# Patient Record
Sex: FEMALE | Race: WHITE | NOT HISPANIC OR LATINO | Employment: UNEMPLOYED | URBAN - METROPOLITAN AREA
[De-identification: names, ages, dates, MRNs, and addresses within clinical notes are randomized per-mention and may not be internally consistent; named-entity substitution may affect disease eponyms.]

---

## 2017-01-01 ENCOUNTER — TELEPHONE (OUTPATIENT)
Dept: HEMATOLOGY/ONCOLOGY | Facility: CLINIC | Age: 47
End: 2017-01-01

## 2017-01-01 ENCOUNTER — INFUSION (OUTPATIENT)
Dept: INFUSION THERAPY | Facility: HOSPITAL | Age: 47
End: 2017-01-01
Attending: INTERNAL MEDICINE
Payer: MEDICAID

## 2017-01-01 ENCOUNTER — HOSPITAL ENCOUNTER (OUTPATIENT)
Dept: RADIOLOGY | Facility: HOSPITAL | Age: 47
Discharge: HOME | End: 2017-01-27
Attending: INTERNAL MEDICINE
Payer: MEDICAID

## 2017-01-01 ENCOUNTER — INITIAL CONSULT (OUTPATIENT)
Dept: RADIATION ONCOLOGY | Facility: CLINIC | Age: 47
End: 2017-01-01
Payer: MEDICAID

## 2017-01-01 ENCOUNTER — TELEPHONE (OUTPATIENT)
Dept: RADIATION ONCOLOGY | Facility: CLINIC | Age: 47
End: 2017-01-01

## 2017-01-01 ENCOUNTER — HOSPITAL ENCOUNTER (EMERGENCY)
Facility: HOSPITAL | Age: 47
Discharge: HOME | End: 2017-02-25
Attending: EMERGENCY MEDICINE
Payer: MEDICAID

## 2017-01-01 ENCOUNTER — TELEPHONE (OUTPATIENT)
Dept: ADMINISTRATIVE | Facility: OTHER | Age: 47
End: 2017-01-01

## 2017-01-01 ENCOUNTER — OFFICE VISIT (OUTPATIENT)
Dept: HEMATOLOGY/ONCOLOGY | Facility: CLINIC | Age: 47
End: 2017-01-01
Payer: MEDICAID

## 2017-01-01 ENCOUNTER — LAB VISIT (OUTPATIENT)
Dept: LAB | Facility: HOSPITAL | Age: 47
End: 2017-01-01
Attending: INTERNAL MEDICINE
Payer: MEDICAID

## 2017-01-01 ENCOUNTER — HOSPITAL ENCOUNTER (OUTPATIENT)
Dept: RADIOLOGY | Facility: HOSPITAL | Age: 47
Discharge: HOME | End: 2017-02-25
Attending: INTERNAL MEDICINE
Payer: MEDICAID

## 2017-01-01 ENCOUNTER — HOSPITAL ENCOUNTER (INPATIENT)
Facility: OTHER | Age: 47
LOS: 6 days | Discharge: HOME | DRG: 947 | End: 2017-02-11
Attending: EMERGENCY MEDICINE | Admitting: HOSPITALIST
Payer: MEDICAID

## 2017-01-01 ENCOUNTER — HOSPITAL ENCOUNTER (EMERGENCY)
Facility: HOSPITAL | Age: 47
Discharge: HOME | End: 2017-02-22
Attending: EMERGENCY MEDICINE
Payer: MEDICAID

## 2017-01-01 ENCOUNTER — ANESTHESIA (OUTPATIENT)
Dept: ENDOSCOPY | Facility: HOSPITAL | Age: 47
DRG: 829 | End: 2017-01-01
Payer: MEDICAID

## 2017-01-01 ENCOUNTER — SURGERY (OUTPATIENT)
Age: 47
End: 2017-01-01

## 2017-01-01 ENCOUNTER — PATIENT OUTREACH (OUTPATIENT)
Dept: ADMINISTRATIVE | Facility: CLINIC | Age: 47
End: 2017-01-01
Payer: MEDICAID

## 2017-01-01 ENCOUNTER — HOSPITAL ENCOUNTER (INPATIENT)
Facility: HOSPITAL | Age: 47
LOS: 4 days | DRG: 189 | End: 2017-03-26
Attending: EMERGENCY MEDICINE | Admitting: HOSPITALIST
Payer: MEDICAID

## 2017-01-01 ENCOUNTER — HOSPITAL ENCOUNTER (INPATIENT)
Facility: HOSPITAL | Age: 47
LOS: 3 days | Discharge: HOME HEALTH | DRG: 808 | End: 2017-03-13
Attending: EMERGENCY MEDICINE | Admitting: FAMILY MEDICINE
Payer: MEDICAID

## 2017-01-01 ENCOUNTER — HOSPITAL ENCOUNTER (INPATIENT)
Facility: HOSPITAL | Age: 47
LOS: 3 days | DRG: 988 | End: 2017-01-11
Attending: HOSPITALIST | Admitting: HOSPITALIST
Payer: MEDICAID

## 2017-01-01 ENCOUNTER — HOSPITAL ENCOUNTER (OUTPATIENT)
Dept: RADIATION THERAPY | Facility: HOSPITAL | Age: 47
Discharge: HOME | End: 2017-01-27
Attending: RADIOLOGY
Payer: MEDICAID

## 2017-01-01 ENCOUNTER — DOCUMENTATION ONLY (OUTPATIENT)
Dept: RADIATION ONCOLOGY | Facility: CLINIC | Age: 47
End: 2017-01-01

## 2017-01-01 ENCOUNTER — HOSPITAL ENCOUNTER (OUTPATIENT)
Dept: RADIOLOGY | Facility: HOSPITAL | Age: 47
Discharge: HOME | End: 2017-02-27
Attending: INTERNAL MEDICINE
Payer: MEDICAID

## 2017-01-01 ENCOUNTER — HOSPITAL ENCOUNTER (INPATIENT)
Facility: HOSPITAL | Age: 47
LOS: 2 days | Discharge: HOME | DRG: 829 | End: 2017-01-13
Attending: HOSPITALIST | Admitting: HOSPITALIST
Payer: MEDICAID

## 2017-01-01 ENCOUNTER — HOSPITAL ENCOUNTER (INPATIENT)
Facility: OTHER | Age: 47
LOS: 6 days | Discharge: HOME | DRG: 542 | End: 2017-02-19
Attending: EMERGENCY MEDICINE | Admitting: HOSPITALIST
Payer: MEDICAID

## 2017-01-01 ENCOUNTER — ANESTHESIA EVENT (OUTPATIENT)
Dept: ENDOSCOPY | Facility: HOSPITAL | Age: 47
DRG: 829 | End: 2017-01-01
Payer: MEDICAID

## 2017-01-01 ENCOUNTER — HOSPITAL ENCOUNTER (OUTPATIENT)
Dept: RADIATION THERAPY | Facility: HOSPITAL | Age: 47
Discharge: HOME | DRG: 542 | End: 2017-02-01
Attending: RADIOLOGY
Payer: MEDICAID

## 2017-01-01 VITALS
DIASTOLIC BLOOD PRESSURE: 86 MMHG | TEMPERATURE: 98 F | HEART RATE: 86 BPM | HEIGHT: 62 IN | OXYGEN SATURATION: 94 % | WEIGHT: 160 LBS | RESPIRATION RATE: 18 BRPM | SYSTOLIC BLOOD PRESSURE: 132 MMHG | BODY MASS INDEX: 29.44 KG/M2

## 2017-01-01 VITALS
WEIGHT: 180 LBS | OXYGEN SATURATION: 93 % | RESPIRATION RATE: 16 BRPM | DIASTOLIC BLOOD PRESSURE: 83 MMHG | SYSTOLIC BLOOD PRESSURE: 130 MMHG | HEIGHT: 62 IN | TEMPERATURE: 98 F | HEART RATE: 115 BPM | BODY MASS INDEX: 33.13 KG/M2

## 2017-01-01 VITALS
DIASTOLIC BLOOD PRESSURE: 60 MMHG | TEMPERATURE: 98 F | WEIGHT: 166.88 LBS | OXYGEN SATURATION: 100 % | RESPIRATION RATE: 20 BRPM | SYSTOLIC BLOOD PRESSURE: 121 MMHG | BODY MASS INDEX: 30.52 KG/M2 | HEART RATE: 115 BPM

## 2017-01-01 VITALS
OXYGEN SATURATION: 94 % | RESPIRATION RATE: 18 BRPM | HEART RATE: 78 BPM | HEIGHT: 62 IN | TEMPERATURE: 98 F | BODY MASS INDEX: 33.68 KG/M2 | WEIGHT: 183 LBS | SYSTOLIC BLOOD PRESSURE: 126 MMHG | DIASTOLIC BLOOD PRESSURE: 79 MMHG

## 2017-01-01 VITALS
OXYGEN SATURATION: 97 % | RESPIRATION RATE: 18 BRPM | HEART RATE: 114 BPM | DIASTOLIC BLOOD PRESSURE: 83 MMHG | DIASTOLIC BLOOD PRESSURE: 86 MMHG | BODY MASS INDEX: 29.44 KG/M2 | WEIGHT: 160 LBS | TEMPERATURE: 98 F | SYSTOLIC BLOOD PRESSURE: 120 MMHG | HEIGHT: 62 IN | RESPIRATION RATE: 18 BRPM | HEART RATE: 110 BPM | SYSTOLIC BLOOD PRESSURE: 123 MMHG | TEMPERATURE: 98 F

## 2017-01-01 VITALS
BODY MASS INDEX: 33.13 KG/M2 | WEIGHT: 180 LBS | RESPIRATION RATE: 18 BRPM | DIASTOLIC BLOOD PRESSURE: 79 MMHG | TEMPERATURE: 98 F | OXYGEN SATURATION: 95 % | SYSTOLIC BLOOD PRESSURE: 133 MMHG | HEIGHT: 62 IN | HEART RATE: 63 BPM

## 2017-01-01 VITALS
DIASTOLIC BLOOD PRESSURE: 58 MMHG | HEART RATE: 127 BPM | TEMPERATURE: 100 F | SYSTOLIC BLOOD PRESSURE: 109 MMHG | RESPIRATION RATE: 16 BRPM

## 2017-01-01 VITALS
BODY MASS INDEX: 26.45 KG/M2 | SYSTOLIC BLOOD PRESSURE: 115 MMHG | DIASTOLIC BLOOD PRESSURE: 75 MMHG | TEMPERATURE: 98 F | HEART RATE: 110 BPM | RESPIRATION RATE: 18 BRPM | OXYGEN SATURATION: 97 % | WEIGHT: 143.75 LBS | HEIGHT: 62 IN

## 2017-01-01 VITALS
OXYGEN SATURATION: 93 % | HEIGHT: 62 IN | DIASTOLIC BLOOD PRESSURE: 69 MMHG | RESPIRATION RATE: 18 BRPM | TEMPERATURE: 97 F | SYSTOLIC BLOOD PRESSURE: 110 MMHG | BODY MASS INDEX: 29.81 KG/M2 | WEIGHT: 162 LBS | HEART RATE: 109 BPM

## 2017-01-01 VITALS
DIASTOLIC BLOOD PRESSURE: 60 MMHG | WEIGHT: 166 LBS | RESPIRATION RATE: 20 BRPM | TEMPERATURE: 98 F | HEART RATE: 115 BPM | BODY MASS INDEX: 30.55 KG/M2 | SYSTOLIC BLOOD PRESSURE: 121 MMHG | HEIGHT: 62 IN

## 2017-01-01 VITALS
WEIGHT: 168.19 LBS | HEIGHT: 62 IN | BODY MASS INDEX: 30.95 KG/M2 | DIASTOLIC BLOOD PRESSURE: 93 MMHG | HEART RATE: 103 BPM | TEMPERATURE: 99 F | RESPIRATION RATE: 22 BRPM | OXYGEN SATURATION: 90 % | SYSTOLIC BLOOD PRESSURE: 155 MMHG

## 2017-01-01 DIAGNOSIS — C50.919 METASTATIC BREAST CANCER: ICD-10-CM

## 2017-01-01 DIAGNOSIS — E80.6 HYPERBILIRUBINEMIA: ICD-10-CM

## 2017-01-01 DIAGNOSIS — C80.1 CANCER: ICD-10-CM

## 2017-01-01 DIAGNOSIS — C25.9: ICD-10-CM

## 2017-01-01 DIAGNOSIS — C79.51 METASTATIC CANCER TO BONE: ICD-10-CM

## 2017-01-01 DIAGNOSIS — C80.1 PRIMARY CANCER OF UNKNOWN SITE: ICD-10-CM

## 2017-01-01 DIAGNOSIS — C79.9 MULTIPLE LESIONS OF METASTATIC MALIGNANCY: ICD-10-CM

## 2017-01-01 DIAGNOSIS — K12.30 MUCOSITIS: ICD-10-CM

## 2017-01-01 DIAGNOSIS — J96.00 ACUTE RESPIRATORY FAILURE: ICD-10-CM

## 2017-01-01 DIAGNOSIS — E87.6 HYPOKALEMIA: ICD-10-CM

## 2017-01-01 DIAGNOSIS — K85.90 PANCREATITIS: ICD-10-CM

## 2017-01-01 DIAGNOSIS — G89.3 PAIN, NEOPLASM-RELATED: ICD-10-CM

## 2017-01-01 DIAGNOSIS — R53.1 WEAKNESS: ICD-10-CM

## 2017-01-01 DIAGNOSIS — J98.11 ATELECTASIS: ICD-10-CM

## 2017-01-01 DIAGNOSIS — C79.81 METASTASIS TO BREAST: ICD-10-CM

## 2017-01-01 DIAGNOSIS — E83.52 HYPERCALCEMIA OF MALIGNANCY: Primary | ICD-10-CM

## 2017-01-01 DIAGNOSIS — C25.9: Primary | ICD-10-CM

## 2017-01-01 DIAGNOSIS — C79.9 METASTATIC CANCER: ICD-10-CM

## 2017-01-01 DIAGNOSIS — H02.401 PTOSIS OF EYELID, RIGHT: Primary | ICD-10-CM

## 2017-01-01 DIAGNOSIS — C80.1 PRIMARY CANCER OF UNKNOWN SITE: Primary | ICD-10-CM

## 2017-01-01 DIAGNOSIS — T45.1X5A CHEMOTHERAPY-INDUCED NEUTROPENIA: ICD-10-CM

## 2017-01-01 DIAGNOSIS — R11.2 INTRACTABLE NAUSEA AND VOMITING: ICD-10-CM

## 2017-01-01 DIAGNOSIS — C79.51 METASTATIC CANCER TO BONE: Primary | ICD-10-CM

## 2017-01-01 DIAGNOSIS — H51.0 GAZE PALSY: ICD-10-CM

## 2017-01-01 DIAGNOSIS — H02.409 PTOSIS OF EYELID, UNSPECIFIED LATERALITY: ICD-10-CM

## 2017-01-01 DIAGNOSIS — R00.0 TACHYCARDIA: ICD-10-CM

## 2017-01-01 DIAGNOSIS — E83.42 HYPOMAGNESEMIA: ICD-10-CM

## 2017-01-01 DIAGNOSIS — C79.9: ICD-10-CM

## 2017-01-01 DIAGNOSIS — R06.02 SHORTNESS OF BREATH: ICD-10-CM
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
## 2017-01-01 DIAGNOSIS — R11.2 INTRACTABLE VOMITING WITH NAUSEA, UNSPECIFIED VOMITING TYPE: Primary | ICD-10-CM

## 2017-01-01 DIAGNOSIS — R11.0 NAUSEA: ICD-10-CM

## 2017-01-01 DIAGNOSIS — J44.1 COPD EXACERBATION: ICD-10-CM

## 2017-01-01 DIAGNOSIS — H02.409 PTOSIS OF EYELID, UNSPECIFIED LATERALITY: Primary | ICD-10-CM

## 2017-01-01 DIAGNOSIS — Z51.11 ENCOUNTER FOR ANTINEOPLASTIC CHEMOTHERAPY: ICD-10-CM

## 2017-01-01 DIAGNOSIS — K85.90 ACUTE PANCREATITIS, UNSPECIFIED COMPLICATION STATUS, UNSPECIFIED PANCREATITIS TYPE: ICD-10-CM

## 2017-01-01 DIAGNOSIS — R11.2 NAUSEA AND VOMITING, INTRACTABILITY OF VOMITING NOT SPECIFIED, UNSPECIFIED VOMITING TYPE: ICD-10-CM

## 2017-01-01 DIAGNOSIS — C79.9 METASTATIC DISEASE: ICD-10-CM

## 2017-01-01 DIAGNOSIS — Z51.89 VISIT FOR WOUND CHECK: Primary | ICD-10-CM

## 2017-01-01 DIAGNOSIS — R93.89 ABNORMAL CT SCAN: ICD-10-CM

## 2017-01-01 DIAGNOSIS — C79.9: Primary | ICD-10-CM

## 2017-01-01 DIAGNOSIS — D70.1 CHEMOTHERAPY-INDUCED NEUTROPENIA: ICD-10-CM

## 2017-01-01 DIAGNOSIS — K83.1 OBSTRUCTIVE JAUNDICE: Primary | ICD-10-CM

## 2017-01-01 DIAGNOSIS — M48.50XA VERTEBRAL COMPRESSION FRACTURE: ICD-10-CM

## 2017-01-01 DIAGNOSIS — E83.52 HYPERCALCEMIA OF MALIGNANCY: ICD-10-CM

## 2017-01-01 DIAGNOSIS — C50.919 METASTATIC BREAST CANCER: Primary | ICD-10-CM

## 2017-01-01 DIAGNOSIS — K85.90 PANCREATITIS, ACUTE: ICD-10-CM

## 2017-01-01 DIAGNOSIS — C78.00: ICD-10-CM

## 2017-01-01 DIAGNOSIS — K85.00 IDIOPATHIC ACUTE PANCREATITIS, UNSPECIFIED COMPLICATION STATUS: Primary | ICD-10-CM

## 2017-01-01 DIAGNOSIS — R06.02 SHORTNESS OF BREATH: Primary | ICD-10-CM

## 2017-01-01 DIAGNOSIS — J44.9 CHRONIC OBSTRUCTIVE PULMONARY DISEASE, UNSPECIFIED COPD TYPE: ICD-10-CM

## 2017-01-01 DIAGNOSIS — G89.3 CHRONIC PAIN DUE TO NEOPLASM: ICD-10-CM

## 2017-01-01 DIAGNOSIS — E86.1 INTRAVASCULAR VOLUME DEPLETION: ICD-10-CM

## 2017-01-01 DIAGNOSIS — R60.9 EDEMA, UNSPECIFIED TYPE: ICD-10-CM

## 2017-01-01 LAB
1,25(OH)2D3 SERPL-MCNC: <5 PG/ML
ABO + RH BLD: NORMAL
ACHR BIND AB SER-SCNC: 0 NMOL/L
ACHR MOD AB/ACHR TOTAL SFR SER: 0 %
ACTH PLAS-MCNC: 23 PG/ML
ALBUMIN SERPL BCP-MCNC: 1.5 G/DL
ALBUMIN SERPL BCP-MCNC: 1.7 G/DL
ALBUMIN SERPL BCP-MCNC: 1.8 G/DL
ALBUMIN SERPL BCP-MCNC: 1.9 G/DL
ALBUMIN SERPL BCP-MCNC: 2 G/DL
ALBUMIN SERPL BCP-MCNC: 2.2 G/DL
ALBUMIN SERPL BCP-MCNC: 2.3 G/DL
ALBUMIN SERPL BCP-MCNC: 2.8 G/DL
ALBUMIN SERPL ELPH-MCNC: 2.53 G/DL
ALLENS TEST: ABNORMAL
ALP SERPL-CCNC: 1105 U/L
ALP SERPL-CCNC: 129 U/L
ALP SERPL-CCNC: 139 U/L
ALP SERPL-CCNC: 149 U/L
ALP SERPL-CCNC: 1503 U/L
ALP SERPL-CCNC: 1505 U/L
ALP SERPL-CCNC: 1512 U/L
ALP SERPL-CCNC: 1581 U/L
ALP SERPL-CCNC: 1690 U/L
ALP SERPL-CCNC: 175 U/L
ALP SERPL-CCNC: 177 U/L
ALP SERPL-CCNC: 180 U/L
ALP SERPL-CCNC: 1927 U/L
ALP SERPL-CCNC: 215 U/L
ALP SERPL-CCNC: 236 U/L
ALP SERPL-CCNC: 261 U/L
ALP SERPL-CCNC: 338 U/L
ALPHA1 GLOB SERPL ELPH-MCNC: 0.5 G/DL
ALPHA2 GLOB SERPL ELPH-MCNC: 0.77 G/DL
ALT SERPL W/O P-5'-P-CCNC: 10 U/L
ALT SERPL W/O P-5'-P-CCNC: 101 U/L
ALT SERPL W/O P-5'-P-CCNC: 150 U/L
ALT SERPL W/O P-5'-P-CCNC: 161 U/L
ALT SERPL W/O P-5'-P-CCNC: 17 U/L
ALT SERPL W/O P-5'-P-CCNC: 176 U/L
ALT SERPL W/O P-5'-P-CCNC: 19 U/L
ALT SERPL W/O P-5'-P-CCNC: 21 U/L
ALT SERPL W/O P-5'-P-CCNC: 23 U/L
ALT SERPL W/O P-5'-P-CCNC: 246 U/L
ALT SERPL W/O P-5'-P-CCNC: 26 U/L
ALT SERPL W/O P-5'-P-CCNC: 28 U/L
ALT SERPL W/O P-5'-P-CCNC: 354 U/L
ALT SERPL W/O P-5'-P-CCNC: 36 U/L
ALT SERPL W/O P-5'-P-CCNC: 537 U/L
AMPHIPHYSIN AB TITR SER: NEGATIVE TITER
AMYLASE SERPL-CCNC: 47 U/L
ANION GAP SERPL CALC-SCNC: 10 MMOL/L
ANION GAP SERPL CALC-SCNC: 11 MMOL/L
ANION GAP SERPL CALC-SCNC: 12 MMOL/L
ANION GAP SERPL CALC-SCNC: 12 MMOL/L
ANION GAP SERPL CALC-SCNC: 13 MMOL/L
ANION GAP SERPL CALC-SCNC: 14 MMOL/L
ANION GAP SERPL CALC-SCNC: 17 MMOL/L
ANION GAP SERPL CALC-SCNC: 6 MMOL/L
ANION GAP SERPL CALC-SCNC: 7 MMOL/L
ANION GAP SERPL CALC-SCNC: 8 MMOL/L
ANION GAP SERPL CALC-SCNC: 9 MMOL/L
ANISOCYTOSIS BLD QL SMEAR: ABNORMAL
ANISOCYTOSIS BLD QL SMEAR: SLIGHT
AST SERPL-CCNC: 106 U/L
AST SERPL-CCNC: 14 U/L
AST SERPL-CCNC: 14 U/L
AST SERPL-CCNC: 146 U/L
AST SERPL-CCNC: 16 U/L
AST SERPL-CCNC: 17 U/L
AST SERPL-CCNC: 19 U/L
AST SERPL-CCNC: 20 U/L
AST SERPL-CCNC: 21 U/L
AST SERPL-CCNC: 30 U/L
AST SERPL-CCNC: 33 U/L
AST SERPL-CCNC: 33 U/L
AST SERPL-CCNC: 50 U/L
AST SERPL-CCNC: 84 U/L
AST SERPL-CCNC: 87 U/L
AST SERPL-CCNC: 87 U/L
AST SERPL-CCNC: 97 U/L
B-GLOBULIN SERPL ELPH-MCNC: 0.79 G/DL
B-HCG UR QL: NEGATIVE
B2 MICROGLOB SERPL-MCNC: 2.2 UG/ML
BACTERIA UR CULT: NO GROWTH
BACTERIA UR CULT: NO GROWTH
BASOPHILS # BLD AUTO: 0 K/UL
BASOPHILS # BLD AUTO: 0.01 K/UL
BASOPHILS # BLD AUTO: 0.01 K/UL
BASOPHILS # BLD AUTO: 0.02 K/UL
BASOPHILS # BLD AUTO: 0.04 K/UL
BASOPHILS # BLD AUTO: 0.1 K/UL
BASOPHILS NFR BLD: 0 %
BASOPHILS NFR BLD: 0.1 %
BASOPHILS NFR BLD: 0.1 %
BASOPHILS NFR BLD: 0.2 %
BASOPHILS NFR BLD: 0.3 %
BASOPHILS NFR BLD: 0.4 %
BASOPHILS NFR BLD: 0.5 %
BASOPHILS NFR BLD: 0.5 %
BASOPHILS NFR BLD: 0.8 %
BILIRUB SERPL-MCNC: 0.4 MG/DL
BILIRUB SERPL-MCNC: 0.5 MG/DL
BILIRUB SERPL-MCNC: 0.5 MG/DL
BILIRUB SERPL-MCNC: 0.6 MG/DL
BILIRUB SERPL-MCNC: 0.6 MG/DL
BILIRUB SERPL-MCNC: 0.8 MG/DL
BILIRUB SERPL-MCNC: 0.9 MG/DL
BILIRUB SERPL-MCNC: 0.9 MG/DL
BILIRUB SERPL-MCNC: 1.4 MG/DL
BILIRUB SERPL-MCNC: 1.6 MG/DL
BILIRUB SERPL-MCNC: 2.9 MG/DL
BILIRUB SERPL-MCNC: 4.2 MG/DL
BILIRUB SERPL-MCNC: 4.3 MG/DL
BILIRUB SERPL-MCNC: 5.3 MG/DL
BILIRUB SERPL-MCNC: 6 MG/DL
BILIRUB SERPL-MCNC: 6.3 MG/DL
BILIRUB SERPL-MCNC: 6.7 MG/DL
BILIRUB UR QL STRIP: ABNORMAL
BILIRUB UR QL STRIP: NEGATIVE
BILIRUB UR QL STRIP: NEGATIVE
BLD GP AB SCN CELLS X3 SERPL QL: NORMAL
BLD PROD TYP BPU: NORMAL
BLOOD UNIT EXPIRATION DATE: NORMAL
BLOOD UNIT TYPE CODE: 6200
BLOOD UNIT TYPE: NORMAL
BNP SERPL-MCNC: <10 PG/ML
BUN SERPL-MCNC: 10 MG/DL
BUN SERPL-MCNC: 11 MG/DL
BUN SERPL-MCNC: 2 MG/DL
BUN SERPL-MCNC: 2 MG/DL
BUN SERPL-MCNC: 3 MG/DL
BUN SERPL-MCNC: 4 MG/DL
BUN SERPL-MCNC: 5 MG/DL
BUN SERPL-MCNC: 6 MG/DL
BUN SERPL-MCNC: 7 MG/DL
BUN SERPL-MCNC: 8 MG/DL
CA-I BLDV-SCNC: 0.98 MMOL/L
CA-I BLDV-SCNC: 1.23 MMOL/L
CALCIUM SERPL-MCNC: 10 MG/DL
CALCIUM SERPL-MCNC: 10.1 MG/DL
CALCIUM SERPL-MCNC: 10.3 MG/DL
CALCIUM SERPL-MCNC: 10.3 MG/DL
CALCIUM SERPL-MCNC: 11.1 MG/DL
CALCIUM SERPL-MCNC: 11.6 MG/DL
CALCIUM SERPL-MCNC: 7.3 MG/DL
CALCIUM SERPL-MCNC: 7.5 MG/DL
CALCIUM SERPL-MCNC: 7.6 MG/DL
CALCIUM SERPL-MCNC: 7.7 MG/DL
CALCIUM SERPL-MCNC: 7.7 MG/DL
CALCIUM SERPL-MCNC: 7.9 MG/DL
CALCIUM SERPL-MCNC: 7.9 MG/DL
CALCIUM SERPL-MCNC: 8 MG/DL
CALCIUM SERPL-MCNC: 8.1 MG/DL
CALCIUM SERPL-MCNC: 8.3 MG/DL
CALCIUM SERPL-MCNC: 8.3 MG/DL
CALCIUM SERPL-MCNC: 8.4 MG/DL
CALCIUM SERPL-MCNC: 8.4 MG/DL
CALCIUM SERPL-MCNC: 8.7 MG/DL
CALCIUM SERPL-MCNC: 8.8 MG/DL
CALCIUM SERPL-MCNC: 8.8 MG/DL
CALCIUM SERPL-MCNC: 9.1 MG/DL
CALCIUM SERPL-MCNC: 9.2 MG/DL
CALCIUM SERPL-MCNC: 9.2 MG/DL
CALCIUM SERPL-MCNC: 9.5 MG/DL
CALCIUM SERPL-MCNC: 9.9 MG/DL
CANCER AG125 SERPL-ACNC: 7770 U/ML
CANCER AG19-9 SERPL-ACNC: ABNORMAL U/ML
CANCER AG27-29 SERPL-ACNC: 5674 U/ML
CEA SERPL-MCNC: 5455.7 NG/ML
CHLORIDE SERPL-SCNC: 100 MMOL/L
CHLORIDE SERPL-SCNC: 101 MMOL/L
CHLORIDE SERPL-SCNC: 102 MMOL/L
CHLORIDE SERPL-SCNC: 103 MMOL/L
CHLORIDE SERPL-SCNC: 103 MMOL/L
CHLORIDE SERPL-SCNC: 105 MMOL/L
CHLORIDE SERPL-SCNC: 107 MMOL/L
CHLORIDE SERPL-SCNC: 108 MMOL/L
CHLORIDE SERPL-SCNC: 108 MMOL/L
CHLORIDE SERPL-SCNC: 109 MMOL/L
CHLORIDE SERPL-SCNC: 110 MMOL/L
CHLORIDE SERPL-SCNC: 87 MMOL/L
CHLORIDE SERPL-SCNC: 90 MMOL/L
CHLORIDE SERPL-SCNC: 92 MMOL/L
CHLORIDE SERPL-SCNC: 95 MMOL/L
CHLORIDE SERPL-SCNC: 96 MMOL/L
CHLORIDE SERPL-SCNC: 97 MMOL/L
CHLORIDE SERPL-SCNC: 98 MMOL/L
CHLORIDE SERPL-SCNC: 98 MMOL/L
CHLORIDE SERPL-SCNC: 99 MMOL/L
CLARITY UR: CLEAR
CO2 SERPL-SCNC: 20 MMOL/L
CO2 SERPL-SCNC: 22 MMOL/L
CO2 SERPL-SCNC: 23 MMOL/L
CO2 SERPL-SCNC: 24 MMOL/L
CO2 SERPL-SCNC: 25 MMOL/L
CO2 SERPL-SCNC: 26 MMOL/L
CO2 SERPL-SCNC: 27 MMOL/L
CO2 SERPL-SCNC: 28 MMOL/L
CO2 SERPL-SCNC: 28 MMOL/L
CO2 SERPL-SCNC: 29 MMOL/L
CO2 SERPL-SCNC: 29 MMOL/L
CO2 SERPL-SCNC: 30 MMOL/L
CO2 SERPL-SCNC: 31 MMOL/L
CO2 SERPL-SCNC: 31 MMOL/L
CO2 SERPL-SCNC: 32 MMOL/L
CO2 SERPL-SCNC: 33 MMOL/L
CO2 SERPL-SCNC: 33 MMOL/L
CO2 SERPL-SCNC: 34 MMOL/L
CO2 SERPL-SCNC: 36 MMOL/L
CODING SYSTEM: NORMAL
COLOR UR: YELLOW
CORTIS SERPL-MCNC: 10.8 UG/DL
CREAT SERPL-MCNC: 0.4 MG/DL
CREAT SERPL-MCNC: 0.5 MG/DL
CREAT SERPL-MCNC: 0.6 MG/DL
CREAT SERPL-MCNC: 1.1 MG/DL
CTP QC/QA: YES
CV2 IGG TITR SER: NEGATIVE TITER
DACRYOCYTES BLD QL SMEAR: ABNORMAL
DELSYS: ABNORMAL
DIFFERENTIAL METHOD: ABNORMAL
DISPENSE STATUS: NORMAL
EBV EA IGG SER QL IF: ABNORMAL TITER
EBV NA IGG SER IA-ACNC: ABNORMAL TITER
EBV VCA IGG SER IA-ACNC: ABNORMAL TITER
EBV VCA IGM SER IA-ACNC: ABNORMAL TITER
EOSINOPHIL # BLD AUTO: 0 K/UL
EOSINOPHIL # BLD AUTO: 0.1 K/UL
EOSINOPHIL # BLD AUTO: 0.2 K/UL
EOSINOPHIL NFR BLD: 0 %
EOSINOPHIL NFR BLD: 0.2 %
EOSINOPHIL NFR BLD: 0.4 %
EOSINOPHIL NFR BLD: 0.5 %
EOSINOPHIL NFR BLD: 0.7 %
EOSINOPHIL NFR BLD: 0.8 %
EOSINOPHIL NFR BLD: 1 %
EOSINOPHIL NFR BLD: 1 %
EOSINOPHIL NFR BLD: 1.2 %
EOSINOPHIL NFR BLD: 1.7 %
EOSINOPHIL NFR BLD: 2.3 %
EOSINOPHIL NFR BLD: 2.7 %
EP: 6
ERYTHROCYTE [DISTWIDTH] IN BLOOD BY AUTOMATED COUNT: 14.3 %
ERYTHROCYTE [DISTWIDTH] IN BLOOD BY AUTOMATED COUNT: 14.4 %
ERYTHROCYTE [DISTWIDTH] IN BLOOD BY AUTOMATED COUNT: 14.5 %
ERYTHROCYTE [DISTWIDTH] IN BLOOD BY AUTOMATED COUNT: 14.5 %
ERYTHROCYTE [DISTWIDTH] IN BLOOD BY AUTOMATED COUNT: 14.9 %
ERYTHROCYTE [DISTWIDTH] IN BLOOD BY AUTOMATED COUNT: 15.6 %
ERYTHROCYTE [DISTWIDTH] IN BLOOD BY AUTOMATED COUNT: 15.7 %
ERYTHROCYTE [DISTWIDTH] IN BLOOD BY AUTOMATED COUNT: 15.9 %
ERYTHROCYTE [DISTWIDTH] IN BLOOD BY AUTOMATED COUNT: 16.6 %
ERYTHROCYTE [DISTWIDTH] IN BLOOD BY AUTOMATED COUNT: 16.8 %
ERYTHROCYTE [DISTWIDTH] IN BLOOD BY AUTOMATED COUNT: 16.9 %
ERYTHROCYTE [DISTWIDTH] IN BLOOD BY AUTOMATED COUNT: 18.7 %
ERYTHROCYTE [DISTWIDTH] IN BLOOD BY AUTOMATED COUNT: 19 %
ERYTHROCYTE [DISTWIDTH] IN BLOOD BY AUTOMATED COUNT: 19.6 %
ERYTHROCYTE [DISTWIDTH] IN BLOOD BY AUTOMATED COUNT: 19.6 %
ERYTHROCYTE [DISTWIDTH] IN BLOOD BY AUTOMATED COUNT: 21 %
ERYTHROCYTE [DISTWIDTH] IN BLOOD BY AUTOMATED COUNT: 21.1 %
ERYTHROCYTE [DISTWIDTH] IN BLOOD BY AUTOMATED COUNT: 21.3 %
ERYTHROCYTE [DISTWIDTH] IN BLOOD BY AUTOMATED COUNT: 21.8 %
ERYTHROCYTE [DISTWIDTH] IN BLOOD BY AUTOMATED COUNT: 22 %
ERYTHROCYTE [DISTWIDTH] IN BLOOD BY AUTOMATED COUNT: 22.2 %
ERYTHROCYTE [DISTWIDTH] IN BLOOD BY AUTOMATED COUNT: 22.7 %
ERYTHROCYTE [SEDIMENTATION RATE] IN BLOOD BY WESTERGREN METHOD: 8 MM/H
EST. GFR  (AFRICAN AMERICAN): >60 ML/MIN/1.73 M^2
EST. GFR  (NON AFRICAN AMERICAN): >60 ML/MIN/1.73 M^2
ESTIMATED AVG GLUCOSE: 134 MG/DL
ESTIMATED AVG GLUCOSE: 137 MG/DL
FIO2: 40
FLUAV AG SPEC QL IA: NEGATIVE
FLUAV AG SPEC QL IA: NEGATIVE
FLUBV AG SPEC QL IA: NEGATIVE
FLUBV AG SPEC QL IA: NEGATIVE
GAMMA GLOB SERPL ELPH-MCNC: 0.61 G/DL
GLIAL NUC TYPE 1 AB TITR SER: NEGATIVE TITER
GLUCOSE SERPL-MCNC: 104 MG/DL
GLUCOSE SERPL-MCNC: 107 MG/DL
GLUCOSE SERPL-MCNC: 107 MG/DL
GLUCOSE SERPL-MCNC: 114 MG/DL
GLUCOSE SERPL-MCNC: 115 MG/DL
GLUCOSE SERPL-MCNC: 115 MG/DL
GLUCOSE SERPL-MCNC: 116 MG/DL
GLUCOSE SERPL-MCNC: 116 MG/DL
GLUCOSE SERPL-MCNC: 119 MG/DL
GLUCOSE SERPL-MCNC: 120 MG/DL
GLUCOSE SERPL-MCNC: 124 MG/DL
GLUCOSE SERPL-MCNC: 130 MG/DL
GLUCOSE SERPL-MCNC: 135 MG/DL
GLUCOSE SERPL-MCNC: 137 MG/DL
GLUCOSE SERPL-MCNC: 138 MG/DL
GLUCOSE SERPL-MCNC: 140 MG/DL
GLUCOSE SERPL-MCNC: 144 MG/DL
GLUCOSE SERPL-MCNC: 148 MG/DL
GLUCOSE SERPL-MCNC: 155 MG/DL
GLUCOSE SERPL-MCNC: 181 MG/DL
GLUCOSE SERPL-MCNC: 230 MG/DL
GLUCOSE SERPL-MCNC: 79 MG/DL
GLUCOSE SERPL-MCNC: 93 MG/DL
GLUCOSE SERPL-MCNC: 98 MG/DL
GLUCOSE SERPL-MCNC: 98 MG/DL
GLUCOSE UR QL STRIP: NEGATIVE
HBA1C MFR BLD HPLC: 6.3 %
HBA1C MFR BLD HPLC: 6.4 %
HCO3 UR-SCNC: 34 MMOL/L (ref 24–28)
HCT VFR BLD AUTO: 18.9 %
HCT VFR BLD AUTO: 24.4 %
HCT VFR BLD AUTO: 24.4 %
HCT VFR BLD AUTO: 24.6 %
HCT VFR BLD AUTO: 24.6 %
HCT VFR BLD AUTO: 24.8 %
HCT VFR BLD AUTO: 25.2 %
HCT VFR BLD AUTO: 25.3 %
HCT VFR BLD AUTO: 26.4 %
HCT VFR BLD AUTO: 27.1 %
HCT VFR BLD AUTO: 27.3 %
HCT VFR BLD AUTO: 27.6 %
HCT VFR BLD AUTO: 28.3 %
HCT VFR BLD AUTO: 28.5 %
HCT VFR BLD AUTO: 30.5 %
HCT VFR BLD AUTO: 30.6 %
HCT VFR BLD AUTO: 31.1 %
HCT VFR BLD AUTO: 31.3 %
HCT VFR BLD AUTO: 31.9 %
HCT VFR BLD AUTO: 32.1 %
HCT VFR BLD AUTO: 33.1 %
HCT VFR BLD AUTO: 33.8 %
HCT VFR BLD AUTO: 35.7 %
HCT VFR BLD AUTO: 41.9 %
HGB BLD-MCNC: 10 G/DL
HGB BLD-MCNC: 10.4 G/DL
HGB BLD-MCNC: 10.4 G/DL
HGB BLD-MCNC: 10.6 G/DL
HGB BLD-MCNC: 10.6 G/DL
HGB BLD-MCNC: 12 G/DL
HGB BLD-MCNC: 14 G/DL
HGB BLD-MCNC: 6.3 G/DL
HGB BLD-MCNC: 7.7 G/DL
HGB BLD-MCNC: 7.7 G/DL
HGB BLD-MCNC: 7.8 G/DL
HGB BLD-MCNC: 7.9 G/DL
HGB BLD-MCNC: 8.2 G/DL
HGB BLD-MCNC: 8.5 G/DL
HGB BLD-MCNC: 8.6 G/DL
HGB BLD-MCNC: 8.8 G/DL
HGB BLD-MCNC: 9 G/DL
HGB BLD-MCNC: 9.2 G/DL
HGB BLD-MCNC: 9.2 G/DL
HGB BLD-MCNC: 9.3 G/DL
HGB BLD-MCNC: 9.5 G/DL
HGB BLD-MCNC: 9.8 G/DL
HGB UR QL STRIP: ABNORMAL
HGB UR QL STRIP: NEGATIVE
HGB UR QL STRIP: NEGATIVE
HU1 AB TITR SER: NEGATIVE TITER
HU2 AB TITR SER IF: NEGATIVE TITER
HU3 AB TITR SER: NEGATIVE TITER
HYPOCHROMIA BLD QL SMEAR: ABNORMAL
IGG SERPL-MCNC: 664 MG/DL
IMMUNOLOGIST REVIEW: NORMAL
INR PPP: 1.1
INTERPRETATION SERPL IFE-IMP: NORMAL
INTERPRETATION UR IFE-IMP: NORMAL
IP: 12
KAPPA LC SER QL IA: 2.23 MG/DL
KAPPA LC/LAMBDA SER IA: 0.99
KETONES UR QL STRIP: ABNORMAL
KETONES UR QL STRIP: ABNORMAL
KETONES UR QL STRIP: NEGATIVE
LACTATE SERPL-SCNC: 0.5 MMOL/L
LACTATE SERPL-SCNC: 0.8 MMOL/L
LACTATE SERPL-SCNC: 1.2 MMOL/L
LACTATE SERPL-SCNC: 2.1 MMOL/L
LAMBDA LC SER QL IA: 2.25 MG/DL
LDH SERPL L TO P-CCNC: 269 U/L
LEUKOCYTE ESTERASE UR QL STRIP: NEGATIVE
LIPASE SERPL-CCNC: 14 U/L
LIPASE SERPL-CCNC: 181 U/L
LIPASE SERPL-CCNC: 63 U/L
LIPASE SERPL-CCNC: 73 U/L
LYMPHOCYTES # BLD AUTO: 0.2 K/UL
LYMPHOCYTES # BLD AUTO: 0.2 K/UL
LYMPHOCYTES # BLD AUTO: 0.3 K/UL
LYMPHOCYTES # BLD AUTO: 0.4 K/UL
LYMPHOCYTES # BLD AUTO: 0.6 K/UL
LYMPHOCYTES # BLD AUTO: 0.6 K/UL
LYMPHOCYTES # BLD AUTO: 0.8 K/UL
LYMPHOCYTES # BLD AUTO: 0.8 K/UL
LYMPHOCYTES # BLD AUTO: 0.9 K/UL
LYMPHOCYTES # BLD AUTO: 1.3 K/UL
LYMPHOCYTES # BLD AUTO: 1.4 K/UL
LYMPHOCYTES # BLD AUTO: 1.5 K/UL
LYMPHOCYTES # BLD AUTO: 1.6 K/UL
LYMPHOCYTES # BLD AUTO: 1.8 K/UL
LYMPHOCYTES # BLD AUTO: 1.9 K/UL
LYMPHOCYTES NFR BLD: 1 %
LYMPHOCYTES NFR BLD: 10.6 %
LYMPHOCYTES NFR BLD: 11 %
LYMPHOCYTES NFR BLD: 11.2 %
LYMPHOCYTES NFR BLD: 12.4 %
LYMPHOCYTES NFR BLD: 13.9 %
LYMPHOCYTES NFR BLD: 15 %
LYMPHOCYTES NFR BLD: 15.2 %
LYMPHOCYTES NFR BLD: 16.6 %
LYMPHOCYTES NFR BLD: 17.1 %
LYMPHOCYTES NFR BLD: 18 %
LYMPHOCYTES NFR BLD: 18.2 %
LYMPHOCYTES NFR BLD: 20.1 %
LYMPHOCYTES NFR BLD: 21.1 %
LYMPHOCYTES NFR BLD: 24.9 %
LYMPHOCYTES NFR BLD: 5.7 %
LYMPHOCYTES NFR BLD: 5.7 %
LYMPHOCYTES NFR BLD: 6.2 %
LYMPHOCYTES NFR BLD: 7.7 %
LYMPHOCYTES NFR BLD: 7.9 %
LYMPHOCYTES NFR BLD: 8.5 %
LYMPHOCYTES NFR BLD: 9.2 %
MAGNESIUM SERPL-MCNC: 1 MG/DL
MAGNESIUM SERPL-MCNC: 1 MG/DL
MAGNESIUM SERPL-MCNC: 1.1 MG/DL
MAGNESIUM SERPL-MCNC: 1.2 MG/DL
MAGNESIUM SERPL-MCNC: 1.2 MG/DL
MAGNESIUM SERPL-MCNC: 1.3 MG/DL
MAGNESIUM SERPL-MCNC: 1.4 MG/DL
MAGNESIUM SERPL-MCNC: 1.4 MG/DL
MAGNESIUM SERPL-MCNC: 1.5 MG/DL
MAGNESIUM SERPL-MCNC: 1.6 MG/DL
MCH RBC QN AUTO: 25.4 PG
MCH RBC QN AUTO: 25.7 PG
MCH RBC QN AUTO: 25.8 PG
MCH RBC QN AUTO: 25.9 PG
MCH RBC QN AUTO: 26.3 PG
MCH RBC QN AUTO: 26.4 PG
MCH RBC QN AUTO: 26.5 PG
MCH RBC QN AUTO: 26.6 PG
MCH RBC QN AUTO: 26.7 PG
MCH RBC QN AUTO: 26.8 PG
MCH RBC QN AUTO: 26.9 PG
MCH RBC QN AUTO: 27 PG
MCH RBC QN AUTO: 27.3 PG
MCH RBC QN AUTO: 27.3 PG
MCH RBC QN AUTO: 27.4 PG
MCH RBC QN AUTO: 27.7 PG
MCH RBC QN AUTO: 28.3 PG
MCH RBC QN AUTO: 29.3 PG
MCH RBC QN AUTO: 29.5 PG
MCH RBC QN AUTO: 29.8 PG
MCH RBC QN AUTO: 29.8 PG
MCH RBC QN AUTO: 29.9 PG
MCHC RBC AUTO-ENTMCNC: 30.1 %
MCHC RBC AUTO-ENTMCNC: 30.4 %
MCHC RBC AUTO-ENTMCNC: 30.5 %
MCHC RBC AUTO-ENTMCNC: 30.6 %
MCHC RBC AUTO-ENTMCNC: 30.8 %
MCHC RBC AUTO-ENTMCNC: 31.2 %
MCHC RBC AUTO-ENTMCNC: 31.4 %
MCHC RBC AUTO-ENTMCNC: 31.5 %
MCHC RBC AUTO-ENTMCNC: 31.7 %
MCHC RBC AUTO-ENTMCNC: 31.7 %
MCHC RBC AUTO-ENTMCNC: 31.9 %
MCHC RBC AUTO-ENTMCNC: 32 %
MCHC RBC AUTO-ENTMCNC: 32 %
MCHC RBC AUTO-ENTMCNC: 32.2 %
MCHC RBC AUTO-ENTMCNC: 32.2 %
MCHC RBC AUTO-ENTMCNC: 32.4 %
MCHC RBC AUTO-ENTMCNC: 32.7 %
MCHC RBC AUTO-ENTMCNC: 32.7 %
MCHC RBC AUTO-ENTMCNC: 33.2 %
MCHC RBC AUTO-ENTMCNC: 33.4 %
MCHC RBC AUTO-ENTMCNC: 33.5 %
MCHC RBC AUTO-ENTMCNC: 33.5 %
MCHC RBC AUTO-ENTMCNC: 33.7 %
MCHC RBC AUTO-ENTMCNC: 34.1 %
MCV RBC AUTO: 79 FL
MCV RBC AUTO: 80 FL
MCV RBC AUTO: 81 FL
MCV RBC AUTO: 81 FL
MCV RBC AUTO: 82 FL
MCV RBC AUTO: 83 FL
MCV RBC AUTO: 84 FL
MCV RBC AUTO: 85 FL
MCV RBC AUTO: 86 FL
MCV RBC AUTO: 87 FL
MCV RBC AUTO: 88 FL
MCV RBC AUTO: 90 FL
MG INTERPRETIVE COMMENTS: NORMAL
MIN VOL: 9
MODE: ABNORMAL
MONOCYTES # BLD AUTO: 0.1 K/UL
MONOCYTES # BLD AUTO: 0.2 K/UL
MONOCYTES # BLD AUTO: 0.3 K/UL
MONOCYTES # BLD AUTO: 0.3 K/UL
MONOCYTES # BLD AUTO: 0.4 K/UL
MONOCYTES # BLD AUTO: 0.6 K/UL
MONOCYTES # BLD AUTO: 0.7 K/UL
MONOCYTES # BLD AUTO: 0.9 K/UL
MONOCYTES NFR BLD: 1.2 %
MONOCYTES NFR BLD: 2 %
MONOCYTES NFR BLD: 2 %
MONOCYTES NFR BLD: 2.3 %
MONOCYTES NFR BLD: 3.2 %
MONOCYTES NFR BLD: 3.4 %
MONOCYTES NFR BLD: 4.3 %
MONOCYTES NFR BLD: 4.3 %
MONOCYTES NFR BLD: 4.7 %
MONOCYTES NFR BLD: 5.1 %
MONOCYTES NFR BLD: 5.4 %
MONOCYTES NFR BLD: 5.5 %
MONOCYTES NFR BLD: 5.5 %
MONOCYTES NFR BLD: 6 %
MONOCYTES NFR BLD: 6.5 %
MONOCYTES NFR BLD: 7 %
MONOCYTES NFR BLD: 7.2 %
MONOCYTES NFR BLD: 7.3 %
MONOCYTES NFR BLD: 7.4 %
MONOCYTES NFR BLD: 7.8 %
MONOCYTES NFR BLD: 8.4 %
MONOCYTES NFR BLD: 9.2 %
MUSK ANTIBODY TEST: 0 NMOL/L (ref 0–0.02)
NACHR AB SER-SCNC: 0 NMOL/L
NEUTROPHILS # BLD AUTO: 1.5 K/UL
NEUTROPHILS # BLD AUTO: 1.6 K/UL
NEUTROPHILS # BLD AUTO: 1.9 K/UL
NEUTROPHILS # BLD AUTO: 10.9 K/UL
NEUTROPHILS # BLD AUTO: 2.5 K/UL
NEUTROPHILS # BLD AUTO: 2.6 K/UL
NEUTROPHILS # BLD AUTO: 3.8 K/UL
NEUTROPHILS # BLD AUTO: 4 K/UL
NEUTROPHILS # BLD AUTO: 4 K/UL
NEUTROPHILS # BLD AUTO: 4.1 K/UL
NEUTROPHILS # BLD AUTO: 4.4 K/UL
NEUTROPHILS # BLD AUTO: 4.5 K/UL
NEUTROPHILS # BLD AUTO: 4.6 K/UL
NEUTROPHILS # BLD AUTO: 5 K/UL
NEUTROPHILS # BLD AUTO: 5.3 K/UL
NEUTROPHILS # BLD AUTO: 5.9 K/UL
NEUTROPHILS # BLD AUTO: 6 K/UL
NEUTROPHILS # BLD AUTO: 6 K/UL
NEUTROPHILS # BLD AUTO: 6.8 K/UL
NEUTROPHILS # BLD AUTO: 6.9 K/UL
NEUTROPHILS # BLD AUTO: 7.2 K/UL
NEUTROPHILS # BLD AUTO: 8.7 K/UL
NEUTROPHILS NFR BLD: 63.3 %
NEUTROPHILS NFR BLD: 68.8 %
NEUTROPHILS NFR BLD: 70 %
NEUTROPHILS NFR BLD: 72 %
NEUTROPHILS NFR BLD: 72.3 %
NEUTROPHILS NFR BLD: 74.1 %
NEUTROPHILS NFR BLD: 74.3 %
NEUTROPHILS NFR BLD: 75.6 %
NEUTROPHILS NFR BLD: 76.9 %
NEUTROPHILS NFR BLD: 78.9 %
NEUTROPHILS NFR BLD: 80.7 %
NEUTROPHILS NFR BLD: 81 %
NEUTROPHILS NFR BLD: 82.1 %
NEUTROPHILS NFR BLD: 82.8 %
NEUTROPHILS NFR BLD: 83.8 %
NEUTROPHILS NFR BLD: 84.7 %
NEUTROPHILS NFR BLD: 87 %
NEUTROPHILS NFR BLD: 87.3 %
NEUTROPHILS NFR BLD: 87.4 %
NEUTROPHILS NFR BLD: 88.6 %
NEUTROPHILS NFR BLD: 89.3 %
NEUTROPHILS NFR BLD: 92.9 %
NEUTS BAND NFR BLD MANUAL: 25 %
NEUTS BAND NFR BLD MANUAL: 6 %
NITRITE UR QL STRIP: NEGATIVE
NRBC BLD-RTO: 1 /100 WBC
NUM UNITS TRANS PACKED RBC: NORMAL
NUM UNITS TRANS WBC-POOR PLATPHERESIS: NORMAL
OSMOLALITY SERPL: 263 MOSM/KG
OVALOCYTES BLD QL SMEAR: ABNORMAL
PATHOLOGIST INTERPRETATION SPE: NORMAL
PAVAL REFLEX TEST ADDED: NORMAL
PCA-1 AB TITR SER: NEGATIVE TITER
PCA-2 AB TITR SER: NEGATIVE TITER
PCA-TR AB TITR SER: NEGATIVE TITER
PCO2 BLDA: 48.1 MMHG (ref 35–45)
PH SMN: 7.46 [PH] (ref 7.35–7.45)
PH UR STRIP: 6 [PH] (ref 5–8)
PH UR STRIP: 6 [PH] (ref 5–8)
PH UR STRIP: 8 [PH] (ref 5–8)
PHOSPHATE SERPL-MCNC: 2.3 MG/DL
PHOSPHATE SERPL-MCNC: 2.4 MG/DL
PHOSPHATE SERPL-MCNC: 3.2 MG/DL
PHOSPHATE SERPL-MCNC: 3.4 MG/DL
PHOSPHATE SERPL-MCNC: 3.4 MG/DL
PHOSPHATE SERPL-MCNC: 3.5 MG/DL
PHOSPHATE SERPL-MCNC: 3.6 MG/DL
PHOSPHATE SERPL-MCNC: 3.7 MG/DL
PHOSPHATE SERPL-MCNC: 3.8 MG/DL
PHOSPHATE SERPL-MCNC: 3.9 MG/DL
PHOSPHATE SERPL-MCNC: 4 MG/DL
PHOSPHATE SERPL-MCNC: 4 MG/DL
PHOSPHATE SERPL-MCNC: 4.2 MG/DL
PLATELET # BLD AUTO: 108 K/UL
PLATELET # BLD AUTO: 111 K/UL
PLATELET # BLD AUTO: 120 K/UL
PLATELET # BLD AUTO: 14 K/UL
PLATELET # BLD AUTO: 143 K/UL
PLATELET # BLD AUTO: 148 K/UL
PLATELET # BLD AUTO: 186 K/UL
PLATELET # BLD AUTO: 20 K/UL
PLATELET # BLD AUTO: 231 K/UL
PLATELET # BLD AUTO: 275 K/UL
PLATELET # BLD AUTO: 310 K/UL
PLATELET # BLD AUTO: 327 K/UL
PLATELET # BLD AUTO: 335 K/UL
PLATELET # BLD AUTO: 35 K/UL
PLATELET # BLD AUTO: 364 K/UL
PLATELET # BLD AUTO: 365 K/UL
PLATELET # BLD AUTO: 385 K/UL
PLATELET # BLD AUTO: 399 K/UL
PLATELET # BLD AUTO: 44 K/UL
PLATELET # BLD AUTO: 46 K/UL
PLATELET # BLD AUTO: 47 K/UL
PLATELET # BLD AUTO: 68 K/UL
PLATELET # BLD AUTO: 73 K/UL
PLATELET # BLD AUTO: 76 K/UL
PLATELET BLD QL SMEAR: ABNORMAL
PMV BLD AUTO: 10 FL
PMV BLD AUTO: 10.1 FL
PMV BLD AUTO: 10.2 FL
PMV BLD AUTO: 10.2 FL
PMV BLD AUTO: 10.3 FL
PMV BLD AUTO: 10.6 FL
PMV BLD AUTO: 11 FL
PMV BLD AUTO: 11.1 FL
PMV BLD AUTO: 11.3 FL
PMV BLD AUTO: 8.1 FL
PMV BLD AUTO: 8.7 FL
PMV BLD AUTO: 8.9 FL
PMV BLD AUTO: 9.1 FL
PMV BLD AUTO: 9.2 FL
PMV BLD AUTO: 9.2 FL
PMV BLD AUTO: 9.3 FL
PMV BLD AUTO: 9.4 FL
PMV BLD AUTO: 9.4 FL
PMV BLD AUTO: 9.9 FL
PMV BLD AUTO: 9.9 FL
PO2 BLDA: 42 MMHG (ref 40–60)
POC BE: 10 MMOL/L
POC SATURATED O2: 79 % (ref 95–100)
POC TCO2: 35 MMOL/L (ref 24–29)
POIKILOCYTOSIS BLD QL SMEAR: SLIGHT
POLYCHROMASIA BLD QL SMEAR: ABNORMAL
POTASSIUM SERPL-SCNC: 2.9 MMOL/L
POTASSIUM SERPL-SCNC: 2.9 MMOL/L
POTASSIUM SERPL-SCNC: 3 MMOL/L
POTASSIUM SERPL-SCNC: 3 MMOL/L
POTASSIUM SERPL-SCNC: 3.1 MMOL/L
POTASSIUM SERPL-SCNC: 3.1 MMOL/L
POTASSIUM SERPL-SCNC: 3.2 MMOL/L
POTASSIUM SERPL-SCNC: 3.3 MMOL/L
POTASSIUM SERPL-SCNC: 3.3 MMOL/L
POTASSIUM SERPL-SCNC: 3.4 MMOL/L
POTASSIUM SERPL-SCNC: 3.5 MMOL/L
POTASSIUM SERPL-SCNC: 3.6 MMOL/L
POTASSIUM SERPL-SCNC: 3.7 MMOL/L
POTASSIUM SERPL-SCNC: 3.7 MMOL/L
POTASSIUM SERPL-SCNC: 3.8 MMOL/L
POTASSIUM SERPL-SCNC: 3.8 MMOL/L
POTASSIUM SERPL-SCNC: 3.9 MMOL/L
POTASSIUM SERPL-SCNC: 3.9 MMOL/L
POTASSIUM SERPL-SCNC: 4 MMOL/L
POTASSIUM SERPL-SCNC: 4.2 MMOL/L
PREALB SERPL-MCNC: 8 MG/DL
PROT SERPL-MCNC: 4.6 G/DL
PROT SERPL-MCNC: 4.9 G/DL
PROT SERPL-MCNC: 5.2 G/DL
PROT SERPL-MCNC: 5.2 G/DL
PROT SERPL-MCNC: 5.6 G/DL
PROT SERPL-MCNC: 5.6 G/DL
PROT SERPL-MCNC: 5.8 G/DL
PROT SERPL-MCNC: 5.8 G/DL
PROT SERPL-MCNC: 5.9 G/DL
PROT SERPL-MCNC: 6.1 G/DL
PROT SERPL-MCNC: 6.3 G/DL
PROT SERPL-MCNC: 6.3 G/DL
PROT SERPL-MCNC: 6.5 G/DL
PROT SERPL-MCNC: 6.9 G/DL
PROT SERPL-MCNC: 7.1 G/DL
PROT SERPL-MCNC: 7.6 G/DL
PROT UR QL STRIP: ABNORMAL
PROT UR QL STRIP: NEGATIVE
PROT UR QL STRIP: NEGATIVE
PROT UR-MCNC: 12 MG/DL
PROTHROMBIN TIME: 11.2 SEC
PROTHROMBIN TIME: 11.3 SEC
PROTHROMBIN TIME: 11.5 SEC
PTH-INTACT SERPL-MCNC: 17.5 PG/ML
RBC # BLD AUTO: 2.39 M/UL
RBC # BLD AUTO: 2.85 M/UL
RBC # BLD AUTO: 2.86 M/UL
RBC # BLD AUTO: 2.89 M/UL
RBC # BLD AUTO: 2.9 M/UL
RBC # BLD AUTO: 2.92 M/UL
RBC # BLD AUTO: 2.95 M/UL
RBC # BLD AUTO: 3.09 M/UL
RBC # BLD AUTO: 3.11 M/UL
RBC # BLD AUTO: 3.17 M/UL
RBC # BLD AUTO: 3.18 M/UL
RBC # BLD AUTO: 3.34 M/UL
RBC # BLD AUTO: 3.46 M/UL
RBC # BLD AUTO: 3.49 M/UL
RBC # BLD AUTO: 3.51 M/UL
RBC # BLD AUTO: 3.53 M/UL
RBC # BLD AUTO: 3.55 M/UL
RBC # BLD AUTO: 3.55 M/UL
RBC # BLD AUTO: 3.61 M/UL
RBC # BLD AUTO: 3.81 M/UL
RBC # BLD AUTO: 3.86 M/UL
RBC # BLD AUTO: 4.05 M/UL
RBC # BLD AUTO: 4.05 M/UL
RBC # BLD AUTO: 4.77 M/UL
SAMPLE: ABNORMAL
SCHISTOCYTES BLD QL SMEAR: ABNORMAL
SCHISTOCYTES BLD QL SMEAR: PRESENT
SITE: ABNORMAL
SODIUM SERPL-SCNC: 127 MMOL/L
SODIUM SERPL-SCNC: 129 MMOL/L
SODIUM SERPL-SCNC: 131 MMOL/L
SODIUM SERPL-SCNC: 134 MMOL/L
SODIUM SERPL-SCNC: 134 MMOL/L
SODIUM SERPL-SCNC: 135 MMOL/L
SODIUM SERPL-SCNC: 136 MMOL/L
SODIUM SERPL-SCNC: 136 MMOL/L
SODIUM SERPL-SCNC: 137 MMOL/L
SODIUM SERPL-SCNC: 137 MMOL/L
SODIUM SERPL-SCNC: 138 MMOL/L
SODIUM SERPL-SCNC: 139 MMOL/L
SODIUM SERPL-SCNC: 140 MMOL/L
SODIUM SERPL-SCNC: 140 MMOL/L
SODIUM SERPL-SCNC: 141 MMOL/L
SODIUM SERPL-SCNC: 141 MMOL/L
SODIUM SERPL-SCNC: 142 MMOL/L
SODIUM SERPL-SCNC: 142 MMOL/L
SODIUM SERPL-SCNC: 143 MMOL/L
SODIUM SERPL-SCNC: 143 MMOL/L
SODIUM UR-SCNC: <20 MMOL/L
SP GR UR STRIP: 1.01 (ref 1–1.03)
SP GR UR STRIP: 1.01 (ref 1–1.03)
SP GR UR STRIP: <=1.005 (ref 1–1.03)
SP02: 98
SPECIMEN SOURCE: NORMAL
SPECIMEN SOURCE: NORMAL
SPONT RATE: 19
STRIA MUS AB TITR SER: NEGATIVE TITER
STRIA MUS AB TITR SER: NEGATIVE TITER
T4 FREE SERPL-MCNC: 1.11 NG/DL
TRANS PLATPHERESIS VOL PATIENT: NORMAL ML
TROPONIN I SERPL DL<=0.01 NG/ML-MCNC: 0.09 NG/ML
TROPONIN I SERPL DL<=0.01 NG/ML-MCNC: <0.006 NG/ML
TSH SERPL DL<=0.005 MIU/L-ACNC: 0.32 UIU/ML
TSH SERPL DL<=0.005 MIU/L-ACNC: 0.89 UIU/ML
TSH SERPL DL<=0.005 MIU/L-ACNC: 1.15 UIU/ML
URN SPEC COLLECT METH UR: ABNORMAL
UROBILINOGEN UR STRIP-ACNC: 1 EU/DL
UROBILINOGEN UR STRIP-ACNC: >=8 EU/DL
UROBILINOGEN UR STRIP-ACNC: ABNORMAL EU/DL
VANCOMYCIN SERPL-MCNC: 17.6 UG/ML
VANCOMYCIN TROUGH SERPL-MCNC: 18.9 UG/ML
VANCOMYCIN TROUGH SERPL-MCNC: 35.3 UG/ML
VGCC-N BIND AB SER-SCNC: 0 NMOL/L
VGCC-P/Q BIND AB SER-SCNC: 0.01 NMOL/L
VGKC AB SER-SCNC: 0 NMOL/L
WBC # BLD AUTO: 0.9 K/UL
WBC # BLD AUTO: 11.6 K/UL
WBC # BLD AUTO: 13.8 K/UL
WBC # BLD AUTO: 2 K/UL
WBC # BLD AUTO: 2 K/UL
WBC # BLD AUTO: 2.94 K/UL
WBC # BLD AUTO: 3.05 K/UL
WBC # BLD AUTO: 3.4 K/UL
WBC # BLD AUTO: 4.3 K/UL
WBC # BLD AUTO: 4.34 K/UL
WBC # BLD AUTO: 4.6 K/UL
WBC # BLD AUTO: 4.9 K/UL
WBC # BLD AUTO: 5.07 K/UL
WBC # BLD AUTO: 5.1 K/UL
WBC # BLD AUTO: 5.3 K/UL
WBC # BLD AUTO: 6.33 K/UL
WBC # BLD AUTO: 7.3 K/UL
WBC # BLD AUTO: 7.3 K/UL
WBC # BLD AUTO: 7.76 K/UL
WBC # BLD AUTO: 8.13 K/UL
WBC # BLD AUTO: 8.23 K/UL
WBC # BLD AUTO: 8.7 K/UL
WBC # BLD AUTO: 9.1 K/UL
WBC # BLD AUTO: 9.16 K/UL

## 2017-01-01 PROCEDURE — 99232 SBSQ HOSP IP/OBS MODERATE 35: CPT | Mod: 25,,, | Performed by: INTERNAL MEDICINE

## 2017-01-01 PROCEDURE — 94640 AIRWAY INHALATION TREATMENT: CPT

## 2017-01-01 PROCEDURE — 70450 CT HEAD/BRAIN W/O DYE: CPT | Mod: 26,,, | Performed by: RADIOLOGY

## 2017-01-01 PROCEDURE — 27200949 HC CANNULATOME: Performed by: INTERNAL MEDICINE

## 2017-01-01 PROCEDURE — C1894 INTRO/SHEATH, NON-LASER: HCPCS | Performed by: RADIOLOGY

## 2017-01-01 PROCEDURE — 70553 MRI BRAIN STEM W/O & W/DYE: CPT | Mod: 26,,, | Performed by: RADIOLOGY

## 2017-01-01 PROCEDURE — 80053 COMPREHEN METABOLIC PANEL: CPT

## 2017-01-01 PROCEDURE — 43274 ERCP DUCT STENT PLACEMENT: CPT | Mod: ,,, | Performed by: INTERNAL MEDICINE

## 2017-01-01 PROCEDURE — 93010 ELECTROCARDIOGRAM REPORT: CPT | Mod: ,,, | Performed by: INTERNAL MEDICINE

## 2017-01-01 PROCEDURE — 37000009 HC ANESTHESIA EA ADD 15 MINS: Performed by: INTERNAL MEDICINE

## 2017-01-01 PROCEDURE — A9585 GADOBUTROL INJECTION: HCPCS | Performed by: HOSPITALIST

## 2017-01-01 PROCEDURE — 63600175 PHARM REV CODE 636 W HCPCS: Performed by: EMERGENCY MEDICINE

## 2017-01-01 PROCEDURE — 63600175 PHARM REV CODE 636 W HCPCS: Performed by: PHYSICIAN ASSISTANT

## 2017-01-01 PROCEDURE — 25000242 PHARM REV CODE 250 ALT 637 W/ HCPCS: Performed by: EMERGENCY MEDICINE

## 2017-01-01 PROCEDURE — 77412 RADIATION TX DELIVERY LVL 3: CPT | Performed by: RADIOLOGY

## 2017-01-01 PROCEDURE — 25000003 PHARM REV CODE 250: Performed by: HOSPITALIST

## 2017-01-01 PROCEDURE — 83735 ASSAY OF MAGNESIUM: CPT

## 2017-01-01 PROCEDURE — 85025 COMPLETE CBC W/AUTO DIFF WBC: CPT

## 2017-01-01 PROCEDURE — 63600175 PHARM REV CODE 636 W HCPCS

## 2017-01-01 PROCEDURE — 93005 ELECTROCARDIOGRAM TRACING: CPT

## 2017-01-01 PROCEDURE — 11000001 HC ACUTE MED/SURG PRIVATE ROOM

## 2017-01-01 PROCEDURE — 85610 PROTHROMBIN TIME: CPT

## 2017-01-01 PROCEDURE — 36415 COLL VENOUS BLD VENIPUNCTURE: CPT

## 2017-01-01 PROCEDURE — 25500020 PHARM REV CODE 255: Performed by: EMERGENCY MEDICINE

## 2017-01-01 PROCEDURE — 63600175 PHARM REV CODE 636 W HCPCS: Performed by: NURSE PRACTITIONER

## 2017-01-01 PROCEDURE — G0378 HOSPITAL OBSERVATION PER HR: HCPCS

## 2017-01-01 PROCEDURE — 99900035 HC TECH TIME PER 15 MIN (STAT)

## 2017-01-01 PROCEDURE — 25000003 PHARM REV CODE 250: Performed by: EMERGENCY MEDICINE

## 2017-01-01 PROCEDURE — 88313 SPECIAL STAINS GROUP 2: CPT | Mod: 26,,, | Performed by: PATHOLOGY

## 2017-01-01 PROCEDURE — 97803 MED NUTRITION INDIV SUBSEQ: CPT

## 2017-01-01 PROCEDURE — 96375 TX/PRO/DX INJ NEW DRUG ADDON: CPT | Mod: 59

## 2017-01-01 PROCEDURE — 43242 EGD US FINE NEEDLE BX/ASPIR: CPT | Mod: 51,,, | Performed by: INTERNAL MEDICINE

## 2017-01-01 PROCEDURE — 27100171 HC OXYGEN HIGH FLOW UP TO 24 HOURS

## 2017-01-01 PROCEDURE — 96375 TX/PRO/DX INJ NEW DRUG ADDON: CPT

## 2017-01-01 PROCEDURE — 43242 EGD US FINE NEEDLE BX/ASPIR: CPT | Performed by: INTERNAL MEDICINE

## 2017-01-01 PROCEDURE — 85027 COMPLETE CBC AUTOMATED: CPT

## 2017-01-01 PROCEDURE — 94761 N-INVAS EAR/PLS OXIMETRY MLT: CPT

## 2017-01-01 PROCEDURE — 25000242 PHARM REV CODE 250 ALT 637 W/ HCPCS: Performed by: FAMILY MEDICINE

## 2017-01-01 PROCEDURE — 96365 THER/PROPH/DIAG IV INF INIT: CPT

## 2017-01-01 PROCEDURE — 77290 THER RAD SIMULAJ FIELD CPLX: CPT | Mod: TC | Performed by: RADIOLOGY

## 2017-01-01 PROCEDURE — 76642 ULTRASOUND BREAST LIMITED: CPT | Mod: 26,RT,, | Performed by: RADIOLOGY

## 2017-01-01 PROCEDURE — 77295 3-D RADIOTHERAPY PLAN: CPT | Mod: 26,,, | Performed by: RADIOLOGY

## 2017-01-01 PROCEDURE — 12000002 HC ACUTE/MED SURGE SEMI-PRIVATE ROOM

## 2017-01-01 PROCEDURE — 82652 VIT D 1 25-DIHYDROXY: CPT

## 2017-01-01 PROCEDURE — 25000003 PHARM REV CODE 250: Performed by: FAMILY MEDICINE

## 2017-01-01 PROCEDURE — 99233 SBSQ HOSP IP/OBS HIGH 50: CPT | Mod: ,,,

## 2017-01-01 PROCEDURE — 25000003 PHARM REV CODE 250: Performed by: RADIOLOGY

## 2017-01-01 PROCEDURE — 25000003 PHARM REV CODE 250: Performed by: NURSE PRACTITIONER

## 2017-01-01 PROCEDURE — 27200999 HC RETRIEVAL BALLOON, ERCP: Performed by: INTERNAL MEDICINE

## 2017-01-01 PROCEDURE — 27000221 HC OXYGEN, UP TO 24 HOURS

## 2017-01-01 PROCEDURE — 96415 CHEMO IV INFUSION ADDL HR: CPT

## 2017-01-01 PROCEDURE — 25000003 PHARM REV CODE 250: Performed by: PHYSICIAN ASSISTANT

## 2017-01-01 PROCEDURE — 63600175 PHARM REV CODE 636 W HCPCS: Performed by: NURSE ANESTHETIST, CERTIFIED REGISTERED

## 2017-01-01 PROCEDURE — 99233 SBSQ HOSP IP/OBS HIGH 50: CPT | Mod: ,,, | Performed by: PHYSICIAN ASSISTANT

## 2017-01-01 PROCEDURE — 20000000 HC ICU ROOM

## 2017-01-01 PROCEDURE — 84156 ASSAY OF PROTEIN URINE: CPT

## 2017-01-01 PROCEDURE — 99223 1ST HOSP IP/OBS HIGH 75: CPT | Mod: ,,, | Performed by: HOSPITALIST

## 2017-01-01 PROCEDURE — 83690 ASSAY OF LIPASE: CPT

## 2017-01-01 PROCEDURE — 86900 BLOOD TYPING SEROLOGIC ABO: CPT

## 2017-01-01 PROCEDURE — 87040 BLOOD CULTURE FOR BACTERIA: CPT

## 2017-01-01 PROCEDURE — 94770 HC EXHALED C02 TEST: CPT

## 2017-01-01 PROCEDURE — 83519 RIA NONANTIBODY: CPT | Mod: 59

## 2017-01-01 PROCEDURE — 63600175 PHARM REV CODE 636 W HCPCS: Performed by: HOSPITALIST

## 2017-01-01 PROCEDURE — 99232 SBSQ HOSP IP/OBS MODERATE 35: CPT | Mod: ,,, | Performed by: INTERNAL MEDICINE

## 2017-01-01 PROCEDURE — 86850 RBC ANTIBODY SCREEN: CPT

## 2017-01-01 PROCEDURE — 25000003 PHARM REV CODE 250

## 2017-01-01 PROCEDURE — 63600175 PHARM REV CODE 636 W HCPCS: Performed by: FAMILY MEDICINE

## 2017-01-01 PROCEDURE — 96374 THER/PROPH/DIAG INJ IV PUSH: CPT

## 2017-01-01 PROCEDURE — 86920 COMPATIBILITY TEST SPIN: CPT

## 2017-01-01 PROCEDURE — 02HV33Z INSERTION OF INFUSION DEVICE INTO SUPERIOR VENA CAVA, PERCUTANEOUS APPROACH: ICD-10-PCS | Performed by: RADIOLOGY

## 2017-01-01 PROCEDURE — 82784 ASSAY IGA/IGD/IGG/IGM EACH: CPT

## 2017-01-01 PROCEDURE — 27201628 HC NEEDLE, EUSN-1, EUSN-3: Performed by: INTERNAL MEDICINE

## 2017-01-01 PROCEDURE — 96365 THER/PROPH/DIAG IV INF INIT: CPT | Mod: 59

## 2017-01-01 PROCEDURE — 84100 ASSAY OF PHOSPHORUS: CPT

## 2017-01-01 PROCEDURE — C9113 INJ PANTOPRAZOLE SODIUM, VIA: HCPCS | Performed by: NURSE PRACTITIONER

## 2017-01-01 PROCEDURE — 96361 HYDRATE IV INFUSION ADD-ON: CPT

## 2017-01-01 PROCEDURE — 99239 HOSP IP/OBS DSCHRG MGMT >30: CPT | Mod: ,,, | Performed by: INTERNAL MEDICINE

## 2017-01-01 PROCEDURE — 80048 BASIC METABOLIC PNL TOTAL CA: CPT

## 2017-01-01 PROCEDURE — 63600175 PHARM REV CODE 636 W HCPCS: Performed by: INTERNAL MEDICINE

## 2017-01-01 PROCEDURE — 25500020 PHARM REV CODE 255

## 2017-01-01 PROCEDURE — 80069 RENAL FUNCTION PANEL: CPT

## 2017-01-01 PROCEDURE — 25000003 PHARM REV CODE 250: Performed by: INTERNAL MEDICINE

## 2017-01-01 PROCEDURE — 87086 URINE CULTURE/COLONY COUNT: CPT

## 2017-01-01 PROCEDURE — 81003 URINALYSIS AUTO W/O SCOPE: CPT

## 2017-01-01 PROCEDURE — 84165 PROTEIN E-PHORESIS SERUM: CPT | Mod: 26,,, | Performed by: PATHOLOGY

## 2017-01-01 PROCEDURE — 81025 URINE PREGNANCY TEST: CPT | Performed by: EMERGENCY MEDICINE

## 2017-01-01 PROCEDURE — 84443 ASSAY THYROID STIM HORMONE: CPT

## 2017-01-01 PROCEDURE — 82533 TOTAL CORTISOL: CPT

## 2017-01-01 PROCEDURE — 99223 1ST HOSP IP/OBS HIGH 75: CPT | Mod: ,,, | Performed by: INTERNAL MEDICINE

## 2017-01-01 PROCEDURE — 99221 1ST HOSP IP/OBS SF/LOW 40: CPT | Mod: ,,, | Performed by: INTERNAL MEDICINE

## 2017-01-01 PROCEDURE — 84484 ASSAY OF TROPONIN QUANT: CPT

## 2017-01-01 PROCEDURE — 86334 IMMUNOFIX E-PHORESIS SERUM: CPT

## 2017-01-01 PROCEDURE — 88173 CYTOPATH EVAL FNA REPORT: CPT | Mod: 26,,, | Performed by: PATHOLOGY

## 2017-01-01 PROCEDURE — 99223 1ST HOSP IP/OBS HIGH 75: CPT | Mod: ,,,

## 2017-01-01 PROCEDURE — 83605 ASSAY OF LACTIC ACID: CPT

## 2017-01-01 PROCEDURE — 96367 TX/PROPH/DG ADDL SEQ IV INF: CPT

## 2017-01-01 PROCEDURE — 88342 IMHCHEM/IMCYTCHM 1ST ANTB: CPT | Mod: 26,,, | Performed by: PATHOLOGY

## 2017-01-01 PROCEDURE — 83970 ASSAY OF PARATHORMONE: CPT

## 2017-01-01 PROCEDURE — 77300 RADIATION THERAPY DOSE PLAN: CPT | Mod: TC | Performed by: RADIOLOGY

## 2017-01-01 PROCEDURE — 80202 ASSAY OF VANCOMYCIN: CPT

## 2017-01-01 PROCEDURE — 88307 TISSUE EXAM BY PATHOLOGIST: CPT | Mod: 26,,, | Performed by: PATHOLOGY

## 2017-01-01 PROCEDURE — 0JH60XZ INSERTION OF TUNNELED VASCULAR ACCESS DEVICE INTO CHEST SUBCUTANEOUS TISSUE AND FASCIA, OPEN APPROACH: ICD-10-PCS | Performed by: RADIOLOGY

## 2017-01-01 PROCEDURE — D9220A PRA ANESTHESIA: Mod: ANES,,, | Performed by: ANESTHESIOLOGY

## 2017-01-01 PROCEDURE — C2625 STENT, NON-COR, TEM W/DEL SY: HCPCS | Performed by: INTERNAL MEDICINE

## 2017-01-01 PROCEDURE — 82024 ASSAY OF ACTH: CPT

## 2017-01-01 PROCEDURE — 99285 EMERGENCY DEPT VISIT HI MDM: CPT | Mod: 25

## 2017-01-01 PROCEDURE — 43274 ERCP DUCT STENT PLACEMENT: CPT | Performed by: INTERNAL MEDICINE

## 2017-01-01 PROCEDURE — 0GB23ZX EXCISION OF LEFT ADRENAL GLAND, PERCUTANEOUS APPROACH, DIAGNOSTIC: ICD-10-PCS | Performed by: INTERNAL MEDICINE

## 2017-01-01 PROCEDURE — 85027 COMPLETE CBC AUTOMATED: CPT | Mod: PO

## 2017-01-01 PROCEDURE — P9038 RBC IRRADIATED: HCPCS

## 2017-01-01 PROCEDURE — 96376 TX/PRO/DX INJ SAME DRUG ADON: CPT

## 2017-01-01 PROCEDURE — 99291 CRITICAL CARE FIRST HOUR: CPT | Mod: ,,, | Performed by: INTERNAL MEDICINE

## 2017-01-01 PROCEDURE — C1769 GUIDE WIRE: HCPCS | Performed by: INTERNAL MEDICINE

## 2017-01-01 PROCEDURE — 99291 CRITICAL CARE FIRST HOUR: CPT | Mod: 25

## 2017-01-01 PROCEDURE — 86665 EPSTEIN-BARR CAPSID VCA: CPT

## 2017-01-01 PROCEDURE — 07BD3ZX EXCISION OF AORTIC LYMPHATIC, PERCUTANEOUS APPROACH, DIAGNOSTIC: ICD-10-PCS | Performed by: INTERNAL MEDICINE

## 2017-01-01 PROCEDURE — 25000003 PHARM REV CODE 250: Performed by: STUDENT IN AN ORGANIZED HEALTH CARE EDUCATION/TRAINING PROGRAM

## 2017-01-01 PROCEDURE — 87400 INFLUENZA A/B EACH AG IA: CPT

## 2017-01-01 PROCEDURE — 27201040 HC RC 50 FILTER

## 2017-01-01 PROCEDURE — C9113 INJ PANTOPRAZOLE SODIUM, VIA: HCPCS | Performed by: PHYSICIAN ASSISTANT

## 2017-01-01 PROCEDURE — 99239 HOSP IP/OBS DSCHRG MGMT >30: CPT | Mod: ,,,

## 2017-01-01 PROCEDURE — 85007 BL SMEAR W/DIFF WBC COUNT: CPT

## 2017-01-01 PROCEDURE — 94660 CPAP INITIATION&MGMT: CPT

## 2017-01-01 PROCEDURE — 77417 THER RADIOLOGY PORT IMAGE(S): CPT | Performed by: RADIOLOGY

## 2017-01-01 PROCEDURE — 99999 PR PBB SHADOW E&M-EST. PATIENT-LVL IV: CPT | Mod: PBBFAC,,, | Performed by: INTERNAL MEDICINE

## 2017-01-01 PROCEDURE — 97802 MEDICAL NUTRITION INDIV IN: CPT

## 2017-01-01 PROCEDURE — 83615 LACTATE (LD) (LDH) ENZYME: CPT

## 2017-01-01 PROCEDURE — 77062 BREAST TOMOSYNTHESIS BI: CPT | Mod: 26,,, | Performed by: RADIOLOGY

## 2017-01-01 PROCEDURE — 82150 ASSAY OF AMYLASE: CPT

## 2017-01-01 PROCEDURE — 84134 ASSAY OF PREALBUMIN: CPT

## 2017-01-01 PROCEDURE — 88305 TISSUE EXAM BY PATHOLOGIST: CPT | Mod: 26,,, | Performed by: PATHOLOGY

## 2017-01-01 PROCEDURE — 20600001 HC STEP DOWN PRIVATE ROOM

## 2017-01-01 PROCEDURE — 63600175 PHARM REV CODE 636 W HCPCS: Performed by: STUDENT IN AN ORGANIZED HEALTH CARE EDUCATION/TRAINING PROGRAM

## 2017-01-01 PROCEDURE — 96367 TX/PROPH/DG ADDL SEQ IV INF: CPT | Mod: 59

## 2017-01-01 PROCEDURE — 74328 X-RAY BILE DUCT ENDOSCOPY: CPT | Performed by: INTERNAL MEDICINE

## 2017-01-01 PROCEDURE — 84439 ASSAY OF FREE THYROXINE: CPT

## 2017-01-01 PROCEDURE — 83520 IMMUNOASSAY QUANT NOS NONAB: CPT

## 2017-01-01 PROCEDURE — 88305 TISSUE EXAM BY PATHOLOGIST: CPT | Performed by: PATHOLOGY

## 2017-01-01 PROCEDURE — 99999 PR PBB SHADOW E&M-EST. PATIENT-LVL III: CPT | Mod: PBBFAC,,, | Performed by: RADIOLOGY

## 2017-01-01 PROCEDURE — 99233 SBSQ HOSP IP/OBS HIGH 50: CPT | Mod: ,,, | Performed by: INTERNAL MEDICINE

## 2017-01-01 PROCEDURE — 82378 CARCINOEMBRYONIC ANTIGEN: CPT

## 2017-01-01 PROCEDURE — 88341 IMHCHEM/IMCYTCHM EA ADD ANTB: CPT | Mod: 26,,, | Performed by: PATHOLOGY

## 2017-01-01 PROCEDURE — 94644 CONT INHLJ TX 1ST HOUR: CPT

## 2017-01-01 PROCEDURE — P9037 PLATE PHERES LEUKOREDU IRRAD: HCPCS

## 2017-01-01 PROCEDURE — 99283 EMERGENCY DEPT VISIT LOW MDM: CPT

## 2017-01-01 PROCEDURE — 27000190 HC CPAP FULL FACE MASK W/VALVE

## 2017-01-01 PROCEDURE — 77334 RADIATION TREATMENT AID(S): CPT | Mod: TC | Performed by: RADIOLOGY

## 2017-01-01 PROCEDURE — 99204 OFFICE O/P NEW MOD 45 MIN: CPT | Mod: S$PBB,,, | Performed by: RADIOLOGY

## 2017-01-01 PROCEDURE — 25000003 PHARM REV CODE 250: Performed by: NURSE ANESTHETIST, CERTIFIED REGISTERED

## 2017-01-01 PROCEDURE — 96413 CHEMO IV INFUSION 1 HR: CPT

## 2017-01-01 PROCEDURE — 94760 N-INVAS EAR/PLS OXIMETRY 1: CPT

## 2017-01-01 PROCEDURE — 99900031 HC PATIENT EDUCATION (STAT)

## 2017-01-01 PROCEDURE — 96372 THER/PROPH/DIAG INJ SC/IM: CPT

## 2017-01-01 PROCEDURE — 99214 OFFICE O/P EST MOD 30 MIN: CPT | Mod: S$PBB,,, | Performed by: INTERNAL MEDICINE

## 2017-01-01 PROCEDURE — 86301 IMMUNOASSAY TUMOR CA 19-9: CPT

## 2017-01-01 PROCEDURE — 36415 COLL VENOUS BLD VENIPUNCTURE: CPT | Mod: PO

## 2017-01-01 PROCEDURE — 83036 HEMOGLOBIN GLYCOSYLATED A1C: CPT

## 2017-01-01 PROCEDURE — 97162 PT EVAL MOD COMPLEX 30 MIN: CPT

## 2017-01-01 PROCEDURE — D9220A PRA ANESTHESIA: Mod: CRNA,,, | Performed by: NURSE ANESTHETIST, CERTIFIED REGISTERED

## 2017-01-01 PROCEDURE — 76642 ULTRASOUND BREAST LIMITED: CPT | Mod: 26,LT,, | Performed by: RADIOLOGY

## 2017-01-01 PROCEDURE — 86256 FLUORESCENT ANTIBODY TITER: CPT | Mod: 91

## 2017-01-01 PROCEDURE — 77014 HC CT GUIDANCE RADIATION THERAPY FLDS PLACEMENT: CPT | Mod: TC | Performed by: RADIOLOGY

## 2017-01-01 PROCEDURE — 88274 CYTOGENETICS 25-99: CPT | Performed by: PATHOLOGY

## 2017-01-01 PROCEDURE — 86304 IMMUNOASSAY TUMOR CA 125: CPT

## 2017-01-01 PROCEDURE — C1769 GUIDE WIRE: HCPCS | Performed by: RADIOLOGY

## 2017-01-01 PROCEDURE — 82803 BLOOD GASES ANY COMBINATION: CPT

## 2017-01-01 PROCEDURE — 86300 IMMUNOASSAY TUMOR CA 15-3: CPT

## 2017-01-01 PROCEDURE — 25500020 PHARM REV CODE 255: Performed by: HOSPITALIST

## 2017-01-01 PROCEDURE — 77066 DX MAMMO INCL CAD BI: CPT | Mod: 26,,, | Performed by: RADIOLOGY

## 2017-01-01 PROCEDURE — 77300 RADIATION THERAPY DOSE PLAN: CPT | Mod: 26,,, | Performed by: RADIOLOGY

## 2017-01-01 PROCEDURE — 0WBF3ZX EXCISION OF ABDOMINAL WALL, PERCUTANEOUS APPROACH, DIAGNOSTIC: ICD-10-PCS | Performed by: INTERNAL MEDICINE

## 2017-01-01 PROCEDURE — 88172 CYTP DX EVAL FNA 1ST EA SITE: CPT | Mod: 26,,, | Performed by: PATHOLOGY

## 2017-01-01 PROCEDURE — 99239 HOSP IP/OBS DSCHRG MGMT >30: CPT | Mod: ,,, | Performed by: PHYSICIAN ASSISTANT

## 2017-01-01 PROCEDURE — 25000242 PHARM REV CODE 250 ALT 637 W/ HCPCS

## 2017-01-01 PROCEDURE — 86335 IMMUNFIX E-PHORSIS/URINE/CSF: CPT

## 2017-01-01 PROCEDURE — 82330 ASSAY OF CALCIUM: CPT

## 2017-01-01 PROCEDURE — 36430 TRANSFUSION BLD/BLD COMPNT: CPT

## 2017-01-01 PROCEDURE — 84300 ASSAY OF URINE SODIUM: CPT

## 2017-01-01 PROCEDURE — 97165 OT EVAL LOW COMPLEX 30 MIN: CPT

## 2017-01-01 PROCEDURE — 83930 ASSAY OF BLOOD OSMOLALITY: CPT

## 2017-01-01 PROCEDURE — 83880 ASSAY OF NATRIURETIC PEPTIDE: CPT

## 2017-01-01 PROCEDURE — 99284 EMERGENCY DEPT VISIT MOD MDM: CPT

## 2017-01-01 PROCEDURE — 84165 PROTEIN E-PHORESIS SERUM: CPT

## 2017-01-01 PROCEDURE — 63600175 PHARM REV CODE 636 W HCPCS: Performed by: RADIOLOGY

## 2017-01-01 PROCEDURE — 25000242 PHARM REV CODE 250 ALT 637 W/ HCPCS: Performed by: PHYSICIAN ASSISTANT

## 2017-01-01 PROCEDURE — 86644 CMV ANTIBODY: CPT

## 2017-01-01 PROCEDURE — 99999 PR PBB SHADOW E&M-EST. PATIENT-LVL V: CPT | Mod: PBBFAC,,, | Performed by: INTERNAL MEDICINE

## 2017-01-01 PROCEDURE — 99213 OFFICE O/P EST LOW 20 MIN: CPT | Mod: PBBFAC | Performed by: RADIOLOGY

## 2017-01-01 PROCEDURE — 88307 TISSUE EXAM BY PATHOLOGIST: CPT | Performed by: PATHOLOGY

## 2017-01-01 PROCEDURE — 74328 X-RAY BILE DUCT ENDOSCOPY: CPT | Mod: 26,,, | Performed by: INTERNAL MEDICINE

## 2017-01-01 PROCEDURE — 96417 CHEMO IV INFUS EACH ADDL SEQ: CPT

## 2017-01-01 PROCEDURE — 97802 MEDICAL NUTRITION INDIV IN: CPT | Performed by: DIETITIAN, REGISTERED

## 2017-01-01 PROCEDURE — 77263 THER RADIOLOGY TX PLNG CPLX: CPT | Mod: ,,, | Performed by: RADIOLOGY

## 2017-01-01 PROCEDURE — 80202 ASSAY OF VANCOMYCIN: CPT | Mod: 91

## 2017-01-01 PROCEDURE — 82232 ASSAY OF BETA-2 PROTEIN: CPT

## 2017-01-01 PROCEDURE — 80053 COMPREHEN METABOLIC PANEL: CPT | Mod: 91

## 2017-01-01 PROCEDURE — 37000008 HC ANESTHESIA 1ST 15 MINUTES: Performed by: INTERNAL MEDICINE

## 2017-01-01 PROCEDURE — 99231 SBSQ HOSP IP/OBS SF/LOW 25: CPT | Mod: ,,, | Performed by: INTERNAL MEDICINE

## 2017-01-01 PROCEDURE — 25000242 PHARM REV CODE 250 ALT 637 W/ HCPCS: Performed by: NURSE PRACTITIONER

## 2017-01-01 PROCEDURE — 0F798DZ DILATION OF COMMON BILE DUCT WITH INTRALUMINAL DEVICE, VIA NATURAL OR ARTIFICIAL OPENING ENDOSCOPIC: ICD-10-PCS | Performed by: INTERNAL MEDICINE

## 2017-01-01 PROCEDURE — 83605 ASSAY OF LACTIC ACID: CPT | Mod: 91

## 2017-01-01 PROCEDURE — 77334 RADIATION TREATMENT AID(S): CPT | Mod: 26,,, | Performed by: RADIOLOGY

## 2017-01-01 PROCEDURE — 77295 3-D RADIOTHERAPY PLAN: CPT | Mod: TC | Performed by: RADIOLOGY

## 2017-01-01 DEVICE — IMPLANTABLE DEVICE: Type: IMPLANTABLE DEVICE | Site: ARM | Status: FUNCTIONAL

## 2017-01-01 RX ORDER — POLYETHYLENE GLYCOL 3350 17 G/17G
17 POWDER, FOR SOLUTION ORAL DAILY
Qty: 30 EACH | Refills: 2 | Status: SHIPPED | OUTPATIENT
Start: 2017-01-01

## 2017-01-01 RX ORDER — MORPHINE SULFATE 30 MG/1
60 TABLET, FILM COATED, EXTENDED RELEASE ORAL EVERY 12 HOURS
Qty: 60 TABLET | Refills: 0 | Status: SHIPPED | OUTPATIENT
Start: 2017-01-01 | End: 2017-01-01

## 2017-01-01 RX ORDER — IBUPROFEN 200 MG
24 TABLET ORAL
Status: DISCONTINUED | OUTPATIENT
Start: 2017-01-01 | End: 2017-01-01

## 2017-01-01 RX ORDER — HEPARIN 100 UNIT/ML
500 SYRINGE INTRAVENOUS
Status: CANCELLED | OUTPATIENT
Start: 2017-01-01

## 2017-01-01 RX ORDER — GABAPENTIN 300 MG/1
300 CAPSULE ORAL 3 TIMES DAILY
COMMUNITY

## 2017-01-01 RX ORDER — POTASSIUM CHLORIDE 20 MEQ/1
40 TABLET, EXTENDED RELEASE ORAL ONCE
Status: COMPLETED | OUTPATIENT
Start: 2017-01-01 | End: 2017-01-01

## 2017-01-01 RX ORDER — DEXAMETHASONE 4 MG/1
8 TABLET ORAL EVERY 12 HOURS
Qty: 60 TABLET | Refills: 3 | Status: ON HOLD | OUTPATIENT
Start: 2017-01-01 | End: 2017-01-01 | Stop reason: HOSPADM

## 2017-01-01 RX ORDER — NALOXONE HCL 0.4 MG/ML
0.02 VIAL (ML) INJECTION
Status: DISCONTINUED | OUTPATIENT
Start: 2017-01-01 | End: 2017-01-01

## 2017-01-01 RX ORDER — LORAZEPAM 2 MG/ML
1 INJECTION INTRAMUSCULAR
Status: DISCONTINUED | OUTPATIENT
Start: 2017-01-01 | End: 2017-03-27 | Stop reason: HOSPADM

## 2017-01-01 RX ORDER — ONDANSETRON 2 MG/ML
4 INJECTION INTRAMUSCULAR; INTRAVENOUS EVERY 6 HOURS PRN
Status: CANCELLED | OUTPATIENT
Start: 2017-01-01

## 2017-01-01 RX ORDER — IBUPROFEN 200 MG
1 TABLET ORAL DAILY
Refills: 0 | COMMUNITY
Start: 2017-01-01

## 2017-01-01 RX ORDER — HYDROMORPHONE HYDROCHLORIDE 1 MG/ML
1 INJECTION, SOLUTION INTRAMUSCULAR; INTRAVENOUS; SUBCUTANEOUS
Status: COMPLETED | OUTPATIENT
Start: 2017-01-01 | End: 2017-01-01

## 2017-01-01 RX ORDER — PANTOPRAZOLE SODIUM 40 MG/10ML
40 INJECTION, POWDER, LYOPHILIZED, FOR SOLUTION INTRAVENOUS DAILY
Status: DISCONTINUED | OUTPATIENT
Start: 2017-01-01 | End: 2017-01-01 | Stop reason: HOSPADM

## 2017-01-01 RX ORDER — IPRATROPIUM BROMIDE AND ALBUTEROL SULFATE 2.5; .5 MG/3ML; MG/3ML
3 SOLUTION RESPIRATORY (INHALATION) EVERY 6 HOURS
Status: DISCONTINUED | OUTPATIENT
Start: 2017-01-01 | End: 2017-01-01

## 2017-01-01 RX ORDER — ONDANSETRON 2 MG/ML
4 INJECTION INTRAMUSCULAR; INTRAVENOUS EVERY 12 HOURS PRN
Status: DISCONTINUED | OUTPATIENT
Start: 2017-01-01 | End: 2017-01-01

## 2017-01-01 RX ORDER — ENOXAPARIN SODIUM 100 MG/ML
40 INJECTION SUBCUTANEOUS EVERY 24 HOURS
Status: DISCONTINUED | OUTPATIENT
Start: 2017-01-01 | End: 2017-01-01

## 2017-01-01 RX ORDER — POTASSIUM CHLORIDE 20 MEQ/15ML
40 SOLUTION ORAL
Status: COMPLETED | OUTPATIENT
Start: 2017-01-01 | End: 2017-01-01

## 2017-01-01 RX ORDER — ONDANSETRON 2 MG/ML
4 INJECTION INTRAMUSCULAR; INTRAVENOUS EVERY 8 HOURS PRN
Status: DISCONTINUED | OUTPATIENT
Start: 2017-01-01 | End: 2017-01-01 | Stop reason: HOSPADM

## 2017-01-01 RX ORDER — MORPHINE SULFATE 30 MG/1
30 TABLET, FILM COATED, EXTENDED RELEASE ORAL 2 TIMES DAILY
COMMUNITY

## 2017-01-01 RX ORDER — FAMOTIDINE 10 MG/ML
20 INJECTION INTRAVENOUS
Status: COMPLETED | OUTPATIENT
Start: 2017-01-01 | End: 2017-01-01

## 2017-01-01 RX ORDER — DIPHENHYDRAMINE HCL 25 MG
25 CAPSULE ORAL
Status: COMPLETED | OUTPATIENT
Start: 2017-01-01 | End: 2017-01-01

## 2017-01-01 RX ORDER — EPINEPHRINE 0.3 MG/.3ML
0.3 INJECTION SUBCUTANEOUS ONCE AS NEEDED
Status: DISCONTINUED | OUTPATIENT
Start: 2017-01-01 | End: 2017-01-01 | Stop reason: HOSPADM

## 2017-01-01 RX ORDER — FENTANYL 75 UG/H
1 PATCH TRANSDERMAL
Status: DISCONTINUED | OUTPATIENT
Start: 2017-01-01 | End: 2017-01-01 | Stop reason: HOSPADM

## 2017-01-01 RX ORDER — POLYETHYLENE GLYCOL 3350 17 G/17G
17 POWDER, FOR SOLUTION ORAL DAILY
Status: DISCONTINUED | OUTPATIENT
Start: 2017-01-01 | End: 2017-01-01 | Stop reason: HOSPADM

## 2017-01-01 RX ORDER — MORPHINE SULFATE 2 MG/ML
2 INJECTION, SOLUTION INTRAMUSCULAR; INTRAVENOUS EVERY 4 HOURS PRN
Status: DISCONTINUED | OUTPATIENT
Start: 2017-01-01 | End: 2017-01-01

## 2017-01-01 RX ORDER — OXYCODONE HYDROCHLORIDE 10 MG/1
10 TABLET ORAL EVERY 4 HOURS PRN
Qty: 60 TABLET | Refills: 0 | Status: SHIPPED | OUTPATIENT
Start: 2017-01-01 | End: 2017-01-01

## 2017-01-01 RX ORDER — FAMOTIDINE 10 MG/ML
20 INJECTION INTRAVENOUS
Status: CANCELLED | OUTPATIENT
Start: 2017-01-01

## 2017-01-01 RX ORDER — FENTANYL 75 UG/H
1 PATCH TRANSDERMAL
Status: DISCONTINUED | OUTPATIENT
Start: 2017-01-01 | End: 2017-01-01

## 2017-01-01 RX ORDER — OXYCODONE HYDROCHLORIDE 10 MG/1
10 TABLET ORAL EVERY 4 HOURS PRN
Qty: 60 TABLET | Refills: 0 | Status: SHIPPED | OUTPATIENT
Start: 2017-01-01

## 2017-01-01 RX ORDER — SODIUM CHLORIDE 9 MG/ML
INJECTION, SOLUTION INTRAVENOUS CONTINUOUS
Status: DISCONTINUED | OUTPATIENT
Start: 2017-01-01 | End: 2017-01-01

## 2017-01-01 RX ORDER — DOCUSATE SODIUM 100 MG/1
100 CAPSULE, LIQUID FILLED ORAL 2 TIMES DAILY
Status: DISCONTINUED | OUTPATIENT
Start: 2017-01-01 | End: 2017-01-01 | Stop reason: HOSPADM

## 2017-01-01 RX ORDER — ENOXAPARIN SODIUM 100 MG/ML
40 INJECTION SUBCUTANEOUS EVERY 24 HOURS
Status: CANCELLED | OUTPATIENT
Start: 2017-01-01

## 2017-01-01 RX ORDER — ONDANSETRON 2 MG/ML
4 INJECTION INTRAMUSCULAR; INTRAVENOUS EVERY 6 HOURS PRN
Status: DISCONTINUED | OUTPATIENT
Start: 2017-01-01 | End: 2017-01-01 | Stop reason: HOSPADM

## 2017-01-01 RX ORDER — CHOLECALCIFEROL (VITAMIN D3) 25 MCG
1000 TABLET ORAL DAILY
Status: DISCONTINUED | OUTPATIENT
Start: 2017-01-01 | End: 2017-01-01 | Stop reason: HOSPADM

## 2017-01-01 RX ORDER — IBUPROFEN 200 MG
1 TABLET ORAL DAILY
Status: DISCONTINUED | OUTPATIENT
Start: 2017-01-01 | End: 2017-01-01

## 2017-01-01 RX ORDER — MORPHINE SULFATE 30 MG/1
30 TABLET, FILM COATED, EXTENDED RELEASE ORAL EVERY 12 HOURS
Qty: 60 TABLET | Refills: 0 | Status: ON HOLD | OUTPATIENT
Start: 2017-01-01 | End: 2017-01-01

## 2017-01-01 RX ORDER — ENOXAPARIN SODIUM 100 MG/ML
80 INJECTION SUBCUTANEOUS
Status: COMPLETED | OUTPATIENT
Start: 2017-01-01 | End: 2017-01-01

## 2017-01-01 RX ORDER — HYDROMORPHONE HYDROCHLORIDE 2 MG/ML
2 INJECTION, SOLUTION INTRAMUSCULAR; INTRAVENOUS; SUBCUTANEOUS
Status: DISCONTINUED | OUTPATIENT
Start: 2017-01-01 | End: 2017-01-01 | Stop reason: HOSPADM

## 2017-01-01 RX ORDER — GLUCAGON 1 MG
1 KIT INJECTION
Status: DISCONTINUED | OUTPATIENT
Start: 2017-01-01 | End: 2017-01-01 | Stop reason: HOSPADM

## 2017-01-01 RX ORDER — HYDROMORPHONE HYDROCHLORIDE 1 MG/ML
1 INJECTION, SOLUTION INTRAMUSCULAR; INTRAVENOUS; SUBCUTANEOUS EVERY 4 HOURS PRN
Status: DISCONTINUED | OUTPATIENT
Start: 2017-01-01 | End: 2017-01-01

## 2017-01-01 RX ORDER — MAGNESIUM SULFATE HEPTAHYDRATE 40 MG/ML
2 INJECTION, SOLUTION INTRAVENOUS ONCE
Status: COMPLETED | OUTPATIENT
Start: 2017-01-01 | End: 2017-01-01

## 2017-01-01 RX ORDER — LIDOCAINE HYDROCHLORIDE 10 MG/ML
INJECTION INFILTRATION; PERINEURAL
Status: DISCONTINUED | OUTPATIENT
Start: 2017-01-01 | End: 2017-01-01 | Stop reason: HOSPADM

## 2017-01-01 RX ORDER — INSULIN ASPART 100 [IU]/ML
0-5 INJECTION, SOLUTION INTRAVENOUS; SUBCUTANEOUS
Status: DISCONTINUED | OUTPATIENT
Start: 2017-01-01 | End: 2017-01-01 | Stop reason: HOSPADM

## 2017-01-01 RX ORDER — DEXTROSE MONOHYDRATE, SODIUM CHLORIDE, AND POTASSIUM CHLORIDE 50; 1.49; 4.5 G/1000ML; G/1000ML; G/1000ML
INJECTION, SOLUTION INTRAVENOUS CONTINUOUS
Status: DISCONTINUED | OUTPATIENT
Start: 2017-01-01 | End: 2017-01-01 | Stop reason: HOSPADM

## 2017-01-01 RX ORDER — FENTANYL CITRATE 50 UG/ML
INJECTION, SOLUTION INTRAMUSCULAR; INTRAVENOUS
Status: DISCONTINUED | OUTPATIENT
Start: 2017-01-01 | End: 2017-01-01

## 2017-01-01 RX ORDER — DIPHENHYDRAMINE HYDROCHLORIDE 50 MG/ML
50 INJECTION INTRAMUSCULAR; INTRAVENOUS ONCE AS NEEDED
Status: CANCELLED | OUTPATIENT
Start: 2017-01-01 | End: 2017-01-01

## 2017-01-01 RX ORDER — GABAPENTIN 300 MG/1
300 CAPSULE ORAL 3 TIMES DAILY
Status: DISCONTINUED | OUTPATIENT
Start: 2017-01-01 | End: 2017-01-01 | Stop reason: HOSPADM

## 2017-01-01 RX ORDER — HEPARIN SODIUM 5000 [USP'U]/ML
5000 INJECTION, SOLUTION INTRAVENOUS; SUBCUTANEOUS EVERY 8 HOURS
Status: DISCONTINUED | OUTPATIENT
Start: 2017-01-01 | End: 2017-01-01 | Stop reason: HOSPADM

## 2017-01-01 RX ORDER — SODIUM CHLORIDE 0.9 % (FLUSH) 0.9 %
10 SYRINGE (ML) INJECTION
Status: CANCELLED | OUTPATIENT
Start: 2017-01-01

## 2017-01-01 RX ORDER — IPRATROPIUM BROMIDE AND ALBUTEROL SULFATE 2.5; .5 MG/3ML; MG/3ML
3 SOLUTION RESPIRATORY (INHALATION) EVERY 4 HOURS
Status: DISCONTINUED | OUTPATIENT
Start: 2017-01-01 | End: 2017-03-27 | Stop reason: HOSPADM

## 2017-01-01 RX ORDER — AMOXICILLIN AND CLAVULANATE POTASSIUM 875; 125 MG/1; MG/1
1 TABLET, FILM COATED ORAL 2 TIMES DAILY
Qty: 14 TABLET | Refills: 0 | Status: SHIPPED | OUTPATIENT
Start: 2017-01-01 | End: 2017-01-01

## 2017-01-01 RX ORDER — OXYCODONE HYDROCHLORIDE 5 MG/1
10 TABLET ORAL EVERY 4 HOURS PRN
Status: DISCONTINUED | OUTPATIENT
Start: 2017-01-01 | End: 2017-01-01 | Stop reason: HOSPADM

## 2017-01-01 RX ORDER — FENTANYL 100 UG/H
1 PATCH TRANSDERMAL
Status: DISCONTINUED | OUTPATIENT
Start: 2017-01-01 | End: 2017-01-01 | Stop reason: HOSPADM

## 2017-01-01 RX ORDER — MORPHINE SULFATE 4 MG/ML
4 INJECTION, SOLUTION INTRAMUSCULAR; INTRAVENOUS
Status: COMPLETED | OUTPATIENT
Start: 2017-01-01 | End: 2017-01-01

## 2017-01-01 RX ORDER — CYANOCOBALAMIN (VITAMIN B-12) 250 MCG
250 TABLET ORAL DAILY
Status: DISCONTINUED | OUTPATIENT
Start: 2017-01-01 | End: 2017-01-01 | Stop reason: HOSPADM

## 2017-01-01 RX ORDER — ALPRAZOLAM 1 MG/1
1 TABLET ORAL 3 TIMES DAILY PRN
Status: DISCONTINUED | OUTPATIENT
Start: 2017-01-01 | End: 2017-01-01

## 2017-01-01 RX ORDER — MORPHINE SULFATE 15 MG/1
30 TABLET, FILM COATED, EXTENDED RELEASE ORAL EVERY 12 HOURS
Status: DISCONTINUED | OUTPATIENT
Start: 2017-01-01 | End: 2017-01-01 | Stop reason: HOSPADM

## 2017-01-01 RX ORDER — IPRATROPIUM BROMIDE AND ALBUTEROL SULFATE 2.5; .5 MG/3ML; MG/3ML
3 SOLUTION RESPIRATORY (INHALATION) EVERY 4 HOURS
Status: DISCONTINUED | OUTPATIENT
Start: 2017-01-01 | End: 2017-01-01 | Stop reason: SDUPTHER

## 2017-01-01 RX ORDER — MORPHINE SULFATE 2 MG/ML
2 INJECTION, SOLUTION INTRAMUSCULAR; INTRAVENOUS EVERY 6 HOURS PRN
Status: COMPLETED | OUTPATIENT
Start: 2017-01-01 | End: 2017-01-01

## 2017-01-01 RX ORDER — OXYCODONE HYDROCHLORIDE 10 MG/1
15 TABLET ORAL EVERY 4 HOURS PRN
Qty: 90 TABLET | Refills: 0 | Status: ON HOLD | OUTPATIENT
Start: 2017-01-01 | End: 2017-01-01

## 2017-01-01 RX ORDER — AMOXICILLIN 250 MG
1 CAPSULE ORAL DAILY
Status: DISCONTINUED | OUTPATIENT
Start: 2017-01-01 | End: 2017-01-01 | Stop reason: HOSPADM

## 2017-01-01 RX ORDER — OXYCODONE HYDROCHLORIDE 5 MG/1
10 TABLET ORAL EVERY 4 HOURS PRN
Status: DISCONTINUED | OUTPATIENT
Start: 2017-01-01 | End: 2017-03-27 | Stop reason: HOSPADM

## 2017-01-01 RX ORDER — FENTANYL 75 UG/H
1 PATCH TRANSDERMAL
Qty: 5 PATCH | Refills: 0 | Status: SHIPPED | OUTPATIENT
Start: 2017-01-01

## 2017-01-01 RX ORDER — LOPERAMIDE HYDROCHLORIDE 2 MG/1
2 CAPSULE ORAL EVERY 4 HOURS PRN
Status: DISCONTINUED | OUTPATIENT
Start: 2017-01-01 | End: 2017-01-01 | Stop reason: HOSPADM

## 2017-01-01 RX ORDER — ONDANSETRON 2 MG/ML
8 INJECTION INTRAMUSCULAR; INTRAVENOUS EVERY 8 HOURS PRN
Status: DISCONTINUED | OUTPATIENT
Start: 2017-01-01 | End: 2017-01-01 | Stop reason: HOSPADM

## 2017-01-01 RX ORDER — IPRATROPIUM BROMIDE AND ALBUTEROL SULFATE 2.5; .5 MG/3ML; MG/3ML
3 SOLUTION RESPIRATORY (INHALATION) EVERY 4 HOURS PRN
Status: CANCELLED | OUTPATIENT
Start: 2017-01-01

## 2017-01-01 RX ORDER — OXYCODONE HYDROCHLORIDE 5 MG/1
15 TABLET ORAL EVERY 4 HOURS PRN
Status: DISCONTINUED | OUTPATIENT
Start: 2017-01-01 | End: 2017-01-01 | Stop reason: HOSPADM

## 2017-01-01 RX ORDER — HYDROMORPHONE HYDROCHLORIDE 2 MG/ML
2 INJECTION, SOLUTION INTRAMUSCULAR; INTRAVENOUS; SUBCUTANEOUS
Status: DISCONTINUED | OUTPATIENT
Start: 2017-01-01 | End: 2017-01-01

## 2017-01-01 RX ORDER — ALBUTEROL SULFATE 2.5 MG/.5ML
2.5 SOLUTION RESPIRATORY (INHALATION) EVERY 4 HOURS PRN
Status: DISCONTINUED | OUTPATIENT
Start: 2017-01-01 | End: 2017-01-01 | Stop reason: HOSPADM

## 2017-01-01 RX ORDER — MAGNESIUM SULFATE/D5W 1 G/50 ML
1 INTRAVENOUS SOLUTION, PIGGYBACK (ML) INTRAVENOUS
Status: COMPLETED | OUTPATIENT
Start: 2017-01-01 | End: 2017-01-01

## 2017-01-01 RX ORDER — HYDROMORPHONE HYDROCHLORIDE 2 MG/ML
2 INJECTION, SOLUTION INTRAMUSCULAR; INTRAVENOUS; SUBCUTANEOUS ONCE
Status: COMPLETED | OUTPATIENT
Start: 2017-01-01 | End: 2017-01-01

## 2017-01-01 RX ORDER — SODIUM CHLORIDE 9 MG/ML
INJECTION, SOLUTION INTRAVENOUS CONTINUOUS
Status: CANCELLED | OUTPATIENT
Start: 2017-01-01

## 2017-01-01 RX ORDER — KETAMINE HYDROCHLORIDE 100 MG/ML
INJECTION, SOLUTION INTRAMUSCULAR; INTRAVENOUS
Status: DISCONTINUED | OUTPATIENT
Start: 2017-01-01 | End: 2017-01-01

## 2017-01-01 RX ORDER — ALPRAZOLAM 1 MG/1
1 TABLET ORAL 3 TIMES DAILY PRN
Status: DISCONTINUED | OUTPATIENT
Start: 2017-01-01 | End: 2017-01-01 | Stop reason: HOSPADM

## 2017-01-01 RX ORDER — PREDNISONE 20 MG/1
TABLET ORAL
Qty: 20 TABLET | Refills: 0 | Status: SHIPPED | OUTPATIENT
Start: 2017-01-01

## 2017-01-01 RX ORDER — HYDROMORPHONE HYDROCHLORIDE 1 MG/ML
0.5 INJECTION, SOLUTION INTRAMUSCULAR; INTRAVENOUS; SUBCUTANEOUS EVERY 4 HOURS PRN
Status: DISCONTINUED | OUTPATIENT
Start: 2017-01-01 | End: 2017-01-01

## 2017-01-01 RX ORDER — ACETAMINOPHEN 325 MG/1
650 TABLET ORAL EVERY 6 HOURS PRN
Status: DISCONTINUED | OUTPATIENT
Start: 2017-01-01 | End: 2017-01-01

## 2017-01-01 RX ORDER — OXYCODONE HYDROCHLORIDE 10 MG/1
15 TABLET ORAL EVERY 4 HOURS PRN
Qty: 60 TABLET | Refills: 0 | Status: SHIPPED | OUTPATIENT
Start: 2017-01-01 | End: 2017-01-01 | Stop reason: SDUPTHER

## 2017-01-01 RX ORDER — GLUCAGON 1 MG
1 KIT INJECTION
Status: DISCONTINUED | OUTPATIENT
Start: 2017-01-01 | End: 2017-01-01

## 2017-01-01 RX ORDER — DOCUSATE SODIUM 100 MG/1
100 CAPSULE, LIQUID FILLED ORAL 2 TIMES DAILY
Status: DISCONTINUED | OUTPATIENT
Start: 2017-01-01 | End: 2017-01-01

## 2017-01-01 RX ORDER — HYDROMORPHONE HYDROCHLORIDE 2 MG/ML
1 INJECTION, SOLUTION INTRAMUSCULAR; INTRAVENOUS; SUBCUTANEOUS ONCE
Status: COMPLETED | OUTPATIENT
Start: 2017-01-01 | End: 2017-01-01

## 2017-01-01 RX ORDER — FLUCONAZOLE 100 MG/1
200 TABLET ORAL ONCE
Status: COMPLETED | OUTPATIENT
Start: 2017-01-01 | End: 2017-01-01

## 2017-01-01 RX ORDER — ALBUTEROL SULFATE 2.5 MG/.5ML
10 SOLUTION RESPIRATORY (INHALATION) CONTINUOUS
Status: DISCONTINUED | OUTPATIENT
Start: 2017-01-01 | End: 2017-01-01

## 2017-01-01 RX ORDER — MORPHINE SULFATE 15 MG/1
15 TABLET, FILM COATED, EXTENDED RELEASE ORAL EVERY 12 HOURS
Status: DISCONTINUED | OUTPATIENT
Start: 2017-01-01 | End: 2017-01-01

## 2017-01-01 RX ORDER — POTASSIUM CHLORIDE 29.8 MG/ML
40 INJECTION INTRAVENOUS ONCE
Status: COMPLETED | OUTPATIENT
Start: 2017-01-01 | End: 2017-01-01

## 2017-01-01 RX ORDER — ONDANSETRON 4 MG/1
4 TABLET, ORALLY DISINTEGRATING ORAL EVERY 8 HOURS PRN
Qty: 60 TABLET | Refills: 3 | Status: SHIPPED | OUTPATIENT
Start: 2017-01-01

## 2017-01-01 RX ORDER — MORPHINE SULFATE 4 MG/ML
4 INJECTION, SOLUTION INTRAMUSCULAR; INTRAVENOUS EVERY 4 HOURS PRN
Status: DISCONTINUED | OUTPATIENT
Start: 2017-01-01 | End: 2017-01-01 | Stop reason: HOSPADM

## 2017-01-01 RX ORDER — IBUPROFEN 200 MG
1 TABLET ORAL DAILY
Status: DISCONTINUED | OUTPATIENT
Start: 2017-01-01 | End: 2017-01-01 | Stop reason: HOSPADM

## 2017-01-01 RX ORDER — PANTOPRAZOLE SODIUM 40 MG/10ML
40 INJECTION, POWDER, LYOPHILIZED, FOR SOLUTION INTRAVENOUS DAILY
Status: CANCELLED | OUTPATIENT
Start: 2017-01-01

## 2017-01-01 RX ORDER — MIDAZOLAM HYDROCHLORIDE 1 MG/ML
INJECTION, SOLUTION INTRAMUSCULAR; INTRAVENOUS
Status: DISCONTINUED | OUTPATIENT
Start: 2017-01-01 | End: 2017-01-01 | Stop reason: HOSPADM

## 2017-01-01 RX ORDER — PYRIDOXINE HCL (VITAMIN B6) 25 MG
100 TABLET ORAL DAILY
Status: DISCONTINUED | OUTPATIENT
Start: 2017-01-01 | End: 2017-01-01 | Stop reason: HOSPADM

## 2017-01-01 RX ORDER — HYDROMORPHONE HYDROCHLORIDE 1 MG/ML
1 INJECTION, SOLUTION INTRAMUSCULAR; INTRAVENOUS; SUBCUTANEOUS
Status: DISCONTINUED | OUTPATIENT
Start: 2017-01-01 | End: 2017-01-01 | Stop reason: HOSPADM

## 2017-01-01 RX ORDER — FENTANYL 75 UG/H
1 PATCH TRANSDERMAL
Status: DISCONTINUED | OUTPATIENT
Start: 2017-01-01 | End: 2017-03-27 | Stop reason: HOSPADM

## 2017-01-01 RX ORDER — SODIUM CHLORIDE 9 MG/ML
INJECTION, SOLUTION INTRAVENOUS CONTINUOUS
Status: DISCONTINUED | OUTPATIENT
Start: 2017-01-01 | End: 2017-03-27 | Stop reason: HOSPADM

## 2017-01-01 RX ORDER — CLOTRIMAZOLE 10 MG/1
10 LOZENGE ORAL; TOPICAL
Status: DISCONTINUED | OUTPATIENT
Start: 2017-01-01 | End: 2017-01-01 | Stop reason: HOSPADM

## 2017-01-01 RX ORDER — HEPARIN 100 UNIT/ML
5 SYRINGE INTRAVENOUS ONCE
Status: COMPLETED | OUTPATIENT
Start: 2017-01-01 | End: 2017-01-01

## 2017-01-01 RX ORDER — PROPOFOL 10 MG/ML
VIAL (ML) INTRAVENOUS
Status: DISCONTINUED | OUTPATIENT
Start: 2017-01-01 | End: 2017-01-01

## 2017-01-01 RX ORDER — ALPRAZOLAM 1 MG/1
1 TABLET ORAL 3 TIMES DAILY PRN
Status: CANCELLED | OUTPATIENT
Start: 2017-01-01

## 2017-01-01 RX ORDER — PROMETHAZINE HYDROCHLORIDE 12.5 MG/1
12.5 TABLET ORAL EVERY 6 HOURS PRN
Qty: 60 TABLET | Refills: 3 | Status: SHIPPED | OUTPATIENT
Start: 2017-01-01

## 2017-01-01 RX ORDER — GADOBUTROL 604.72 MG/ML
7 INJECTION INTRAVENOUS
Status: COMPLETED | OUTPATIENT
Start: 2017-01-01 | End: 2017-01-01

## 2017-01-01 RX ORDER — MAGNESIUM SULFATE/D5W 2 G/50 ML
2 INTRAVENOUS SOLUTION, PIGGYBACK (ML) INTRAVENOUS ONCE
Status: DISCONTINUED | OUTPATIENT
Start: 2017-01-01 | End: 2017-01-01

## 2017-01-01 RX ORDER — IPRATROPIUM BROMIDE AND ALBUTEROL SULFATE 2.5; .5 MG/3ML; MG/3ML
3 SOLUTION RESPIRATORY (INHALATION) EVERY 4 HOURS PRN
Status: DISCONTINUED | OUTPATIENT
Start: 2017-01-01 | End: 2017-01-01 | Stop reason: HOSPADM

## 2017-01-01 RX ORDER — AMOXICILLIN 250 MG
1 CAPSULE ORAL DAILY
COMMUNITY
Start: 2017-01-01

## 2017-01-01 RX ORDER — SODIUM CHLORIDE 0.9 % (FLUSH) 0.9 %
10 SYRINGE (ML) INJECTION
Status: DISCONTINUED | OUTPATIENT
Start: 2017-01-01 | End: 2017-01-01 | Stop reason: HOSPADM

## 2017-01-01 RX ORDER — LORAZEPAM 2 MG/ML
INJECTION INTRAMUSCULAR
Status: COMPLETED
Start: 2017-01-01 | End: 2017-01-01

## 2017-01-01 RX ORDER — GABAPENTIN 300 MG/1
300 CAPSULE ORAL 3 TIMES DAILY
Status: CANCELLED | OUTPATIENT
Start: 2017-01-01

## 2017-01-01 RX ORDER — DEXAMETHASONE 4 MG/1
8 TABLET ORAL EVERY 12 HOURS
Qty: 40 TABLET | Refills: 0 | Status: SHIPPED | OUTPATIENT
Start: 2017-01-01 | End: 2017-01-01

## 2017-01-01 RX ORDER — AZITHROMYCIN 250 MG/1
500 TABLET, FILM COATED ORAL ONCE
Status: COMPLETED | OUTPATIENT
Start: 2017-01-01 | End: 2017-01-01

## 2017-01-01 RX ORDER — GABAPENTIN 300 MG/1
300 CAPSULE ORAL 3 TIMES DAILY
Status: DISCONTINUED | OUTPATIENT
Start: 2017-01-01 | End: 2017-01-01

## 2017-01-01 RX ORDER — MORPHINE SULFATE 2 MG/ML
6 INJECTION, SOLUTION INTRAMUSCULAR; INTRAVENOUS
Status: COMPLETED | OUTPATIENT
Start: 2017-01-01 | End: 2017-01-01

## 2017-01-01 RX ORDER — LIDOCAINE HCL/PF 100 MG/5ML
SYRINGE (ML) INTRAVENOUS
Status: DISCONTINUED | OUTPATIENT
Start: 2017-01-01 | End: 2017-01-01

## 2017-01-01 RX ORDER — ACETAMINOPHEN 650 MG/1
650 SUPPOSITORY RECTAL EVERY 6 HOURS PRN
Status: DISCONTINUED | OUTPATIENT
Start: 2017-01-01 | End: 2017-03-27 | Stop reason: HOSPADM

## 2017-01-01 RX ORDER — DIPHENHYDRAMINE HYDROCHLORIDE 50 MG/ML
50 INJECTION INTRAMUSCULAR; INTRAVENOUS ONCE AS NEEDED
Status: DISCONTINUED | OUTPATIENT
Start: 2017-01-01 | End: 2017-01-01 | Stop reason: HOSPADM

## 2017-01-01 RX ORDER — MIDAZOLAM HYDROCHLORIDE 1 MG/ML
INJECTION, SOLUTION INTRAMUSCULAR; INTRAVENOUS
Status: DISCONTINUED | OUTPATIENT
Start: 2017-01-01 | End: 2017-01-01

## 2017-01-01 RX ORDER — ALBUTEROL SULFATE 2.5 MG/.5ML
5 SOLUTION RESPIRATORY (INHALATION) EVERY 4 HOURS PRN
Status: DISCONTINUED | OUTPATIENT
Start: 2017-01-01 | End: 2017-03-27 | Stop reason: HOSPADM

## 2017-01-01 RX ORDER — IBUPROFEN 200 MG
16 TABLET ORAL
Status: DISCONTINUED | OUTPATIENT
Start: 2017-01-01 | End: 2017-01-01

## 2017-01-01 RX ORDER — HYDROMORPHONE HYDROCHLORIDE 2 MG/ML
1 INJECTION, SOLUTION INTRAMUSCULAR; INTRAVENOUS; SUBCUTANEOUS
Status: DISCONTINUED | OUTPATIENT
Start: 2017-01-01 | End: 2017-01-01

## 2017-01-01 RX ORDER — MORPHINE SULFATE 2 MG/ML
2 INJECTION, SOLUTION INTRAMUSCULAR; INTRAVENOUS
Status: DISCONTINUED | OUTPATIENT
Start: 2017-01-01 | End: 2017-03-27 | Stop reason: HOSPADM

## 2017-01-01 RX ORDER — PNV NO.95/FERROUS FUM/FOLIC AC 28MG-0.8MG
100 TABLET ORAL DAILY
Status: DISCONTINUED | OUTPATIENT
Start: 2017-01-01 | End: 2017-01-01

## 2017-01-01 RX ORDER — MORPHINE SULFATE 4 MG/ML
4 INJECTION, SOLUTION INTRAMUSCULAR; INTRAVENOUS EVERY 4 HOURS PRN
Status: CANCELLED | OUTPATIENT
Start: 2017-01-01

## 2017-01-01 RX ORDER — EPINEPHRINE 1 MG/ML
0.3 INJECTION, SOLUTION INTRACARDIAC; INTRAMUSCULAR; INTRAVENOUS; SUBCUTANEOUS ONCE AS NEEDED
Status: CANCELLED | OUTPATIENT
Start: 2017-01-01 | End: 2017-01-01

## 2017-01-01 RX ORDER — OXYCODONE HYDROCHLORIDE 5 MG/1
15 TABLET ORAL EVERY 4 HOURS PRN
Status: DISCONTINUED | OUTPATIENT
Start: 2017-01-01 | End: 2017-01-01

## 2017-01-01 RX ORDER — MORPHINE SULFATE 15 MG/1
30 TABLET, FILM COATED, EXTENDED RELEASE ORAL 2 TIMES DAILY
Status: DISCONTINUED | OUTPATIENT
Start: 2017-01-01 | End: 2017-03-27 | Stop reason: HOSPADM

## 2017-01-01 RX ORDER — DEXAMETHASONE 4 MG/1
8 TABLET ORAL EVERY 12 HOURS
COMMUNITY

## 2017-01-01 RX ORDER — POTASSIUM CHLORIDE 7.45 MG/ML
10 INJECTION INTRAVENOUS
Status: COMPLETED | OUTPATIENT
Start: 2017-01-01 | End: 2017-01-01

## 2017-01-01 RX ORDER — GADOBUTROL 604.72 MG/ML
10 INJECTION INTRAVENOUS
Status: COMPLETED | OUTPATIENT
Start: 2017-01-01 | End: 2017-01-01

## 2017-01-01 RX ORDER — HYDROMORPHONE HYDROCHLORIDE 2 MG/ML
1 INJECTION, SOLUTION INTRAMUSCULAR; INTRAVENOUS; SUBCUTANEOUS EVERY 4 HOURS PRN
Status: DISCONTINUED | OUTPATIENT
Start: 2017-01-01 | End: 2017-01-01

## 2017-01-01 RX ORDER — AMITRIPTYLINE HYDROCHLORIDE 50 MG/1
50 TABLET, FILM COATED ORAL NIGHTLY
COMMUNITY

## 2017-01-01 RX ORDER — ENOXAPARIN SODIUM 100 MG/ML
1 INJECTION SUBCUTANEOUS
Status: DISCONTINUED | OUTPATIENT
Start: 2017-01-01 | End: 2017-01-01

## 2017-01-01 RX ORDER — FLUCONAZOLE 100 MG/1
100 TABLET ORAL DAILY
Status: DISCONTINUED | OUTPATIENT
Start: 2017-01-01 | End: 2017-01-01 | Stop reason: HOSPADM

## 2017-01-01 RX ORDER — HYDROMORPHONE HYDROCHLORIDE 2 MG/ML
1 INJECTION, SOLUTION INTRAMUSCULAR; INTRAVENOUS; SUBCUTANEOUS EVERY 6 HOURS PRN
Status: CANCELLED | OUTPATIENT
Start: 2017-01-01

## 2017-01-01 RX ORDER — OXYCODONE HYDROCHLORIDE 10 MG/1
15 TABLET ORAL EVERY 4 HOURS PRN
Qty: 30 TABLET | Refills: 0 | Status: ON HOLD | OUTPATIENT
Start: 2017-01-01 | End: 2017-01-01

## 2017-01-01 RX ORDER — FENTANYL 75 UG/H
1 PATCH TRANSDERMAL
Qty: 5 PATCH | Refills: 0 | Status: SHIPPED | OUTPATIENT
Start: 2017-01-01 | End: 2017-01-01 | Stop reason: SDUPTHER

## 2017-01-01 RX ORDER — LANOLIN ALCOHOL/MO/W.PET/CERES
400 CREAM (GRAM) TOPICAL 2 TIMES DAILY
Status: DISCONTINUED | OUTPATIENT
Start: 2017-01-01 | End: 2017-01-01

## 2017-01-01 RX ORDER — SYRING-NEEDL,DISP,INSUL,0.3 ML 29 G X1/2"
148 SYRINGE, EMPTY DISPOSABLE MISCELLANEOUS ONCE
Status: COMPLETED | OUTPATIENT
Start: 2017-01-01 | End: 2017-01-01

## 2017-01-01 RX ORDER — DEXAMETHASONE 4 MG/1
8 TABLET ORAL EVERY 12 HOURS
Status: DISCONTINUED | OUTPATIENT
Start: 2017-01-01 | End: 2017-03-27 | Stop reason: HOSPADM

## 2017-01-01 RX ORDER — LEVOFLOXACIN 750 MG/1
750 TABLET ORAL DAILY
Qty: 5 TABLET | Refills: 0 | Status: SHIPPED | OUTPATIENT
Start: 2017-01-01 | End: 2017-01-01

## 2017-01-01 RX ORDER — AMITRIPTYLINE HYDROCHLORIDE 50 MG/1
50 TABLET, FILM COATED ORAL NIGHTLY
Status: DISCONTINUED | OUTPATIENT
Start: 2017-01-01 | End: 2017-03-27 | Stop reason: HOSPADM

## 2017-01-01 RX ORDER — IBUPROFEN 200 MG
16 TABLET ORAL
Status: DISCONTINUED | OUTPATIENT
Start: 2017-01-01 | End: 2017-01-01 | Stop reason: HOSPADM

## 2017-01-01 RX ORDER — SODIUM CHLORIDE 9 MG/ML
INJECTION, SOLUTION INTRAVENOUS CONTINUOUS
Status: DISCONTINUED | OUTPATIENT
Start: 2017-01-01 | End: 2017-01-01 | Stop reason: HOSPADM

## 2017-01-01 RX ORDER — CLOTRIMAZOLE 10 MG/1
10 LOZENGE ORAL; TOPICAL
Qty: 35 TABLET | Refills: 0 | Status: SHIPPED | OUTPATIENT
Start: 2017-01-01 | End: 2017-01-01

## 2017-01-01 RX ORDER — CIPROFLOXACIN 2 MG/ML
400 INJECTION, SOLUTION INTRAVENOUS
Status: DISCONTINUED | OUTPATIENT
Start: 2017-01-01 | End: 2017-01-01

## 2017-01-01 RX ORDER — METOCLOPRAMIDE HYDROCHLORIDE 5 MG/ML
5 INJECTION INTRAMUSCULAR; INTRAVENOUS EVERY 6 HOURS PRN
Status: DISCONTINUED | OUTPATIENT
Start: 2017-01-01 | End: 2017-01-01 | Stop reason: HOSPADM

## 2017-01-01 RX ORDER — ONDANSETRON 4 MG/1
4 TABLET, ORALLY DISINTEGRATING ORAL EVERY 8 HOURS PRN
Qty: 30 TABLET | Refills: 0 | Status: SHIPPED | OUTPATIENT
Start: 2017-01-01 | End: 2017-01-01 | Stop reason: SDUPTHER

## 2017-01-01 RX ORDER — PNV NO.95/FERROUS FUM/FOLIC AC 28MG-0.8MG
100 TABLET ORAL DAILY
COMMUNITY

## 2017-01-01 RX ORDER — SODIUM CHLORIDE 9 MG/ML
1000 INJECTION, SOLUTION INTRAVENOUS
Status: COMPLETED | OUTPATIENT
Start: 2017-01-01 | End: 2017-01-01

## 2017-01-01 RX ORDER — HEPARIN 100 UNIT/ML
500 SYRINGE INTRAVENOUS
Status: DISCONTINUED | OUTPATIENT
Start: 2017-01-01 | End: 2017-01-01 | Stop reason: HOSPADM

## 2017-01-01 RX ORDER — ONDANSETRON 2 MG/ML
8 INJECTION INTRAMUSCULAR; INTRAVENOUS
Status: COMPLETED | OUTPATIENT
Start: 2017-01-01 | End: 2017-01-01

## 2017-01-01 RX ORDER — ACETAMINOPHEN 325 MG/1
650 TABLET ORAL EVERY 4 HOURS PRN
Status: DISCONTINUED | OUTPATIENT
Start: 2017-01-01 | End: 2017-01-01 | Stop reason: HOSPADM

## 2017-01-01 RX ORDER — ACETAMINOPHEN 325 MG/1
650 TABLET ORAL EVERY 8 HOURS PRN
Status: DISCONTINUED | OUTPATIENT
Start: 2017-01-01 | End: 2017-01-01 | Stop reason: HOSPADM

## 2017-01-01 RX ORDER — METHYLPREDNISOLONE SOD SUCC 125 MG
125 VIAL (EA) INJECTION
Status: COMPLETED | OUTPATIENT
Start: 2017-01-01 | End: 2017-01-01

## 2017-01-01 RX ORDER — OXYCODONE HYDROCHLORIDE 5 MG/1
10 TABLET ORAL EVERY 6 HOURS PRN
Status: DISCONTINUED | OUTPATIENT
Start: 2017-01-01 | End: 2017-01-01

## 2017-01-01 RX ORDER — CEFAZOLIN SODIUM 1 G/3ML
INJECTION, POWDER, FOR SOLUTION INTRAMUSCULAR; INTRAVENOUS
Status: DISCONTINUED | OUTPATIENT
Start: 2017-01-01 | End: 2017-01-01

## 2017-01-01 RX ORDER — HYDROMORPHONE HYDROCHLORIDE 1 MG/ML
1 INJECTION, SOLUTION INTRAMUSCULAR; INTRAVENOUS; SUBCUTANEOUS EVERY 6 HOURS PRN
Status: DISCONTINUED | OUTPATIENT
Start: 2017-01-01 | End: 2017-01-01

## 2017-01-01 RX ORDER — HYDROMORPHONE HCL IN 0.9% NACL 6 MG/30 ML
PATIENT CONTROLLED ANALGESIA SYRINGE INTRAVENOUS CONTINUOUS
Status: DISCONTINUED | OUTPATIENT
Start: 2017-01-01 | End: 2017-01-01

## 2017-01-01 RX ORDER — METHYLPREDNISOLONE SOD SUCC 125 MG
125 VIAL (EA) INJECTION ONCE
Status: COMPLETED | OUTPATIENT
Start: 2017-01-01 | End: 2017-01-01

## 2017-01-01 RX ORDER — GABAPENTIN 300 MG/1
300 CAPSULE ORAL 3 TIMES DAILY
Status: DISCONTINUED | OUTPATIENT
Start: 2017-01-01 | End: 2017-03-27 | Stop reason: HOSPADM

## 2017-01-01 RX ORDER — DIAZEPAM 10 MG/2ML
10 INJECTION INTRAMUSCULAR
Status: COMPLETED | OUTPATIENT
Start: 2017-01-01 | End: 2017-01-01

## 2017-01-01 RX ORDER — HEPARIN SOD,PORCINE/0.9 % NACL 1000/500ML
INTRAVENOUS SOLUTION INTRAVENOUS
Status: DISCONTINUED | OUTPATIENT
Start: 2017-01-01 | End: 2017-01-01

## 2017-01-01 RX ORDER — ACETAMINOPHEN 325 MG/1
650 TABLET ORAL
Status: COMPLETED | OUTPATIENT
Start: 2017-01-01 | End: 2017-01-01

## 2017-01-01 RX ORDER — PROPOFOL 10 MG/ML
VIAL (ML) INTRAVENOUS CONTINUOUS PRN
Status: DISCONTINUED | OUTPATIENT
Start: 2017-01-01 | End: 2017-01-01

## 2017-01-01 RX ORDER — HYDROMORPHONE HYDROCHLORIDE 2 MG/ML
2 INJECTION, SOLUTION INTRAMUSCULAR; INTRAVENOUS; SUBCUTANEOUS
Status: DISCONTINUED | OUTPATIENT
Start: 2017-01-01 | End: 2017-03-27 | Stop reason: HOSPADM

## 2017-01-01 RX ORDER — PANTOPRAZOLE SODIUM 40 MG/10ML
40 INJECTION, POWDER, LYOPHILIZED, FOR SOLUTION INTRAVENOUS DAILY
Status: DISCONTINUED | OUTPATIENT
Start: 2017-01-01 | End: 2017-03-27 | Stop reason: HOSPADM

## 2017-01-01 RX ORDER — LANOLIN ALCOHOL/MO/W.PET/CERES
400 CREAM (GRAM) TOPICAL 2 TIMES DAILY
Qty: 10 TABLET | Refills: 1 | Status: SHIPPED | OUTPATIENT
Start: 2017-01-01 | End: 2018-02-27

## 2017-01-01 RX ORDER — PANTOPRAZOLE SODIUM 40 MG/10ML
40 INJECTION, POWDER, LYOPHILIZED, FOR SOLUTION INTRAVENOUS DAILY
Status: DISCONTINUED | OUTPATIENT
Start: 2017-01-01 | End: 2017-01-01

## 2017-01-01 RX ORDER — SODIUM CHLORIDE 9 MG/ML
1000 INJECTION, SOLUTION INTRAVENOUS CONTINUOUS
Status: DISCONTINUED | OUTPATIENT
Start: 2017-01-01 | End: 2017-01-01

## 2017-01-01 RX ORDER — DEXAMETHASONE 4 MG/1
8 TABLET ORAL EVERY 12 HOURS
Status: DISCONTINUED | OUTPATIENT
Start: 2017-01-01 | End: 2017-01-01 | Stop reason: HOSPADM

## 2017-01-01 RX ORDER — DOCUSATE SODIUM 100 MG/1
100 CAPSULE, LIQUID FILLED ORAL 2 TIMES DAILY
Status: CANCELLED | OUTPATIENT
Start: 2017-01-01

## 2017-01-01 RX ORDER — IBUPROFEN 200 MG
24 TABLET ORAL
Status: DISCONTINUED | OUTPATIENT
Start: 2017-01-01 | End: 2017-01-01 | Stop reason: HOSPADM

## 2017-01-01 RX ORDER — HYDROMORPHONE HYDROCHLORIDE 2 MG/ML
1 INJECTION, SOLUTION INTRAMUSCULAR; INTRAVENOUS; SUBCUTANEOUS EVERY 6 HOURS PRN
Status: DISCONTINUED | OUTPATIENT
Start: 2017-01-01 | End: 2017-01-01 | Stop reason: HOSPADM

## 2017-01-01 RX ORDER — HYDROCODONE BITARTRATE AND ACETAMINOPHEN 500; 5 MG/1; MG/1
TABLET ORAL
Status: DISCONTINUED | OUTPATIENT
Start: 2017-01-01 | End: 2017-01-01 | Stop reason: HOSPADM

## 2017-01-01 RX ORDER — ONDANSETRON 2 MG/ML
4 INJECTION INTRAMUSCULAR; INTRAVENOUS EVERY 8 HOURS PRN
Status: DISCONTINUED | OUTPATIENT
Start: 2017-01-01 | End: 2017-01-01

## 2017-01-01 RX ORDER — ALBUTEROL SULFATE 2.5 MG/.5ML
SOLUTION RESPIRATORY (INHALATION)
Status: COMPLETED
Start: 2017-01-01 | End: 2017-01-01

## 2017-01-01 RX ORDER — MULTIVITAMIN
1 TABLET ORAL DAILY
COMMUNITY

## 2017-01-01 RX ORDER — OXYCODONE AND ACETAMINOPHEN 5; 325 MG/1; MG/1
1 TABLET ORAL EVERY 4 HOURS PRN
Status: DISCONTINUED | OUTPATIENT
Start: 2017-01-01 | End: 2017-01-01

## 2017-01-01 RX ORDER — OXYCODONE HYDROCHLORIDE 10 MG/1
10 TABLET ORAL EVERY 4 HOURS PRN
Qty: 60 TABLET | Refills: 0 | Status: SHIPPED | OUTPATIENT
Start: 2017-01-01 | End: 2017-01-01 | Stop reason: SDUPTHER

## 2017-01-01 RX ORDER — MORPHINE SULFATE 30 MG/1
30 TABLET, FILM COATED, EXTENDED RELEASE ORAL EVERY 12 HOURS
Qty: 60 TABLET | Refills: 0 | Status: SHIPPED | OUTPATIENT
Start: 2017-01-01 | End: 2017-01-01

## 2017-01-01 RX ORDER — LACTULOSE 10 G/15ML
10 SOLUTION ORAL ONCE
Status: COMPLETED | OUTPATIENT
Start: 2017-01-01 | End: 2017-01-01

## 2017-01-01 RX ORDER — DEXAMETHASONE 4 MG/1
8 TABLET ORAL EVERY 12 HOURS
COMMUNITY
End: 2017-01-01 | Stop reason: SDUPTHER

## 2017-01-01 RX ORDER — MORPHINE SULFATE 2 MG/ML
4 INJECTION, SOLUTION INTRAMUSCULAR; INTRAVENOUS EVERY 4 HOURS PRN
Status: DISCONTINUED | OUTPATIENT
Start: 2017-01-01 | End: 2017-01-01

## 2017-01-01 RX ORDER — PYRIDOXINE HCL (VITAMIN B6) 100 MG
100 TABLET ORAL DAILY
COMMUNITY

## 2017-01-01 RX ORDER — ENOXAPARIN SODIUM 100 MG/ML
40 INJECTION SUBCUTANEOUS EVERY 24 HOURS
Status: DISCONTINUED | OUTPATIENT
Start: 2017-01-01 | End: 2017-01-01 | Stop reason: HOSPADM

## 2017-01-01 RX ADMIN — PIPERACILLIN SODIUM AND TAZOBACTAM SODIUM 4.5 G: 4; .5 INJECTION, POWDER, LYOPHILIZED, FOR SOLUTION INTRAVENOUS at 09:01

## 2017-01-01 RX ADMIN — ALBUTEROL SULFATE 2.5 MG: 2.5 SOLUTION RESPIRATORY (INHALATION) at 11:02

## 2017-01-01 RX ADMIN — HYDROMORPHONE HYDROCHLORIDE 1 MG: 1 INJECTION, SOLUTION INTRAMUSCULAR; INTRAVENOUS; SUBCUTANEOUS at 12:02

## 2017-01-01 RX ADMIN — THERA TABS 1 TABLET: TAB at 09:02

## 2017-01-01 RX ADMIN — ALPRAZOLAM 1 MG: 1 TABLET ORAL at 12:01

## 2017-01-01 RX ADMIN — HYDROMORPHONE HYDROCHLORIDE 1 MG: 2 INJECTION, SOLUTION INTRAMUSCULAR; INTRAVENOUS; SUBCUTANEOUS at 08:03

## 2017-01-01 RX ADMIN — KETAMINE HYDROCHLORIDE 20 MG: 100 INJECTION, SOLUTION, CONCENTRATE INTRAMUSCULAR; INTRAVENOUS at 01:01

## 2017-01-01 RX ADMIN — STANDARDIZED SENNA CONCENTRATE AND DOCUSATE SODIUM 1 TABLET: 8.6; 5 TABLET, FILM COATED ORAL at 08:02

## 2017-01-01 RX ADMIN — CYANOCOBALAMIN TAB 250 MCG 250 MCG: 250 TAB at 08:02

## 2017-01-01 RX ADMIN — SODIUM CHLORIDE 1000 ML: 0.9 INJECTION, SOLUTION INTRAVENOUS at 09:03

## 2017-01-01 RX ADMIN — ENOXAPARIN SODIUM 80 MG: 100 INJECTION SUBCUTANEOUS at 02:02

## 2017-01-01 RX ADMIN — MORPHINE SULFATE 15 MG: 15 TABLET, EXTENDED RELEASE ORAL at 08:01

## 2017-01-01 RX ADMIN — OXYCODONE HYDROCHLORIDE 15 MG: 5 TABLET ORAL at 02:02

## 2017-01-01 RX ADMIN — OXYCODONE HYDROCHLORIDE 15 MG: 5 TABLET ORAL at 11:02

## 2017-01-01 RX ADMIN — PYRIDOXINE HCL TAB 50 MG 100 MG: 50 TAB at 02:02

## 2017-01-01 RX ADMIN — HYDROMORPHONE HYDROCHLORIDE 1 MG: 1 INJECTION, SOLUTION INTRAMUSCULAR; INTRAVENOUS; SUBCUTANEOUS at 05:02

## 2017-01-01 RX ADMIN — Medication: at 08:03

## 2017-01-01 RX ADMIN — METHYLPREDNISOLONE SODIUM SUCCINATE 125 MG: 125 INJECTION, POWDER, FOR SOLUTION INTRAMUSCULAR; INTRAVENOUS at 02:03

## 2017-01-01 RX ADMIN — FLUCONAZOLE 200 MG: 100 TABLET ORAL at 06:03

## 2017-01-01 RX ADMIN — DEXAMETHASONE 8 MG: 4 TABLET ORAL at 09:03

## 2017-01-01 RX ADMIN — PANTOPRAZOLE SODIUM 40 MG: 40 INJECTION, POWDER, FOR SOLUTION INTRAVENOUS at 08:01

## 2017-01-01 RX ADMIN — PANTOPRAZOLE SODIUM 40 MG: 40 INJECTION, POWDER, FOR SOLUTION INTRAVENOUS at 09:03

## 2017-01-01 RX ADMIN — FENTANYL CITRATE 25 MCG: 50 INJECTION, SOLUTION INTRAMUSCULAR; INTRAVENOUS at 11:01

## 2017-01-01 RX ADMIN — GABAPENTIN 300 MG: 300 CAPSULE ORAL at 07:02

## 2017-01-01 RX ADMIN — HYDROMORPHONE HYDROCHLORIDE 1 MG: 2 INJECTION INTRAMUSCULAR; INTRAVENOUS; SUBCUTANEOUS at 01:01

## 2017-01-01 RX ADMIN — THERA TABS 1 TABLET: TAB at 08:02

## 2017-01-01 RX ADMIN — PIPERACILLIN SODIUM AND TAZOBACTAM SODIUM 3.38 G: 3; .375 INJECTION, POWDER, LYOPHILIZED, FOR SOLUTION INTRAVENOUS at 04:03

## 2017-01-01 RX ADMIN — CYANOCOBALAMIN TAB 250 MCG 250 MCG: 250 TAB at 03:02

## 2017-01-01 RX ADMIN — GABAPENTIN 300 MG: 300 CAPSULE ORAL at 06:02

## 2017-01-01 RX ADMIN — FENTANYL 1 PATCH: 75 PATCH TRANSDERMAL at 11:02

## 2017-01-01 RX ADMIN — HYDROMORPHONE HYDROCHLORIDE 1 MG: 1 INJECTION, SOLUTION INTRAMUSCULAR; INTRAVENOUS; SUBCUTANEOUS at 04:02

## 2017-01-01 RX ADMIN — MORPHINE SULFATE 30 MG: 15 TABLET, EXTENDED RELEASE ORAL at 08:02

## 2017-01-01 RX ADMIN — HYDROMORPHONE HYDROCHLORIDE 1 MG: 1 INJECTION, SOLUTION INTRAMUSCULAR; INTRAVENOUS; SUBCUTANEOUS at 06:02

## 2017-01-01 RX ADMIN — HYDROMORPHONE HYDROCHLORIDE 1 MG: 1 INJECTION, SOLUTION INTRAMUSCULAR; INTRAVENOUS; SUBCUTANEOUS at 10:02

## 2017-01-01 RX ADMIN — CLOTRIMAZOLE 10 MG: 10 LOZENGE ORAL at 06:02

## 2017-01-01 RX ADMIN — OXYCODONE HYDROCHLORIDE 15 MG: 5 TABLET ORAL at 12:02

## 2017-01-01 RX ADMIN — HYDROMORPHONE HYDROCHLORIDE 2 MG: 2 INJECTION, SOLUTION INTRAMUSCULAR; INTRAVENOUS; SUBCUTANEOUS at 03:02

## 2017-01-01 RX ADMIN — PIPERACILLIN SODIUM AND TAZOBACTAM SODIUM 3.38 G: 3; .375 INJECTION, POWDER, LYOPHILIZED, FOR SOLUTION INTRAVENOUS at 10:03

## 2017-01-01 RX ADMIN — MORPHINE SULFATE 4 MG: 4 INJECTION INTRAVENOUS at 09:01

## 2017-01-01 RX ADMIN — GABAPENTIN 300 MG: 300 CAPSULE ORAL at 03:02

## 2017-01-01 RX ADMIN — OXYCODONE HYDROCHLORIDE 15 MG: 5 TABLET ORAL at 04:02

## 2017-01-01 RX ADMIN — GABAPENTIN 300 MG: 300 CAPSULE ORAL at 01:02

## 2017-01-01 RX ADMIN — SODIUM CHLORIDE: 0.9 INJECTION, SOLUTION INTRAVENOUS at 02:02

## 2017-01-01 RX ADMIN — NICOTINE 1 PATCH: 14 PATCH, EXTENDED RELEASE TRANSDERMAL at 09:02

## 2017-01-01 RX ADMIN — GABAPENTIN 300 MG: 300 CAPSULE ORAL at 09:03

## 2017-01-01 RX ADMIN — GABAPENTIN 300 MG: 300 CAPSULE ORAL at 01:03

## 2017-01-01 RX ADMIN — APIXABAN 5 MG: 2.5 TABLET, FILM COATED ORAL at 09:02

## 2017-01-01 RX ADMIN — SODIUM CHLORIDE: 0.9 INJECTION, SOLUTION INTRAVENOUS at 12:03

## 2017-01-01 RX ADMIN — GABAPENTIN 300 MG: 300 CAPSULE ORAL at 10:02

## 2017-01-01 RX ADMIN — GABAPENTIN 300 MG: 300 CAPSULE ORAL at 05:01

## 2017-01-01 RX ADMIN — PYRIDOXINE HCL TAB 50 MG 100 MG: 50 TAB at 10:02

## 2017-01-01 RX ADMIN — STANDARDIZED SENNA CONCENTRATE AND DOCUSATE SODIUM 1 TABLET: 8.6; 5 TABLET, FILM COATED ORAL at 09:02

## 2017-01-01 RX ADMIN — OXYCODONE HYDROCHLORIDE 15 MG: 5 TABLET ORAL at 06:02

## 2017-01-01 RX ADMIN — HYDROMORPHONE HYDROCHLORIDE 1 MG: 1 INJECTION, SOLUTION INTRAMUSCULAR; INTRAVENOUS; SUBCUTANEOUS at 11:02

## 2017-01-01 RX ADMIN — DEXAMETHASONE 8 MG: 4 TABLET ORAL at 10:02

## 2017-01-01 RX ADMIN — TBO-FILGRASTIM 300 MCG: 300 INJECTION, SOLUTION SUBCUTANEOUS at 01:03

## 2017-01-01 RX ADMIN — GABAPENTIN 300 MG: 300 CAPSULE ORAL at 03:03

## 2017-01-01 RX ADMIN — SODIUM CHLORIDE: 0.9 INJECTION, SOLUTION INTRAVENOUS at 07:01

## 2017-01-01 RX ADMIN — SODIUM CHLORIDE: 0.9 INJECTION, SOLUTION INTRAVENOUS at 05:03

## 2017-01-01 RX ADMIN — LIDOCAINE HYDROCHLORIDE 80 MG: 20 INJECTION, SOLUTION INTRAVENOUS at 11:01

## 2017-01-01 RX ADMIN — HYDROMORPHONE HYDROCHLORIDE 1 MG: 2 INJECTION INTRAMUSCULAR; INTRAVENOUS; SUBCUTANEOUS at 10:01

## 2017-01-01 RX ADMIN — GABAPENTIN 300 MG: 300 CAPSULE ORAL at 01:01

## 2017-01-01 RX ADMIN — HYDROMORPHONE HYDROCHLORIDE 1 MG: 1 INJECTION, SOLUTION INTRAMUSCULAR; INTRAVENOUS; SUBCUTANEOUS at 06:01

## 2017-01-01 RX ADMIN — GABAPENTIN 300 MG: 300 CAPSULE ORAL at 09:01

## 2017-01-01 RX ADMIN — MORPHINE SULFATE 2 MG: 2 INJECTION, SOLUTION INTRAMUSCULAR; INTRAVENOUS at 10:01

## 2017-01-01 RX ADMIN — MORPHINE SULFATE 4 MG: 4 INJECTION INTRAVENOUS at 03:01

## 2017-01-01 RX ADMIN — METHYLPREDNISOLONE SODIUM SUCCINATE 125 MG: 125 INJECTION, POWDER, FOR SOLUTION INTRAMUSCULAR; INTRAVENOUS at 09:03

## 2017-01-01 RX ADMIN — MAGNESIUM SULFATE HEPTAHYDRATE 1.5 G: 500 INJECTION, SOLUTION INTRAMUSCULAR; INTRAVENOUS at 01:03

## 2017-01-01 RX ADMIN — HYDROMORPHONE HYDROCHLORIDE 1 MG: 1 INJECTION, SOLUTION INTRAMUSCULAR; INTRAVENOUS; SUBCUTANEOUS at 09:02

## 2017-01-01 RX ADMIN — SODIUM CHLORIDE: 0.9 INJECTION, SOLUTION INTRAVENOUS at 08:03

## 2017-01-01 RX ADMIN — OXYCODONE HYDROCHLORIDE 10 MG: 5 TABLET ORAL at 06:03

## 2017-01-01 RX ADMIN — DEXTROSE MONOHYDRATE, SODIUM CHLORIDE, AND POTASSIUM CHLORIDE: 50; 4.5; 1.49 INJECTION, SOLUTION INTRAVENOUS at 02:02

## 2017-01-01 RX ADMIN — VANCOMYCIN HYDROCHLORIDE 1000 MG: 1 INJECTION, POWDER, LYOPHILIZED, FOR SOLUTION INTRAVENOUS at 03:03

## 2017-01-01 RX ADMIN — HYDROMORPHONE HYDROCHLORIDE 2 MG: 2 INJECTION, SOLUTION INTRAMUSCULAR; INTRAVENOUS; SUBCUTANEOUS at 06:03

## 2017-01-01 RX ADMIN — CLOTRIMAZOLE 10 MG: 10 LOZENGE ORAL at 10:02

## 2017-01-01 RX ADMIN — HYDROMORPHONE HYDROCHLORIDE 1 MG: 1 INJECTION, SOLUTION INTRAMUSCULAR; INTRAVENOUS; SUBCUTANEOUS at 02:01

## 2017-01-01 RX ADMIN — APIXABAN 5 MG: 2.5 TABLET, FILM COATED ORAL at 08:02

## 2017-01-01 RX ADMIN — ONDANSETRON 8 MG: 2 INJECTION, SOLUTION INTRAMUSCULAR; INTRAVENOUS at 01:02

## 2017-01-01 RX ADMIN — GABAPENTIN 300 MG: 300 CAPSULE ORAL at 09:02

## 2017-01-01 RX ADMIN — POTASSIUM CHLORIDE 10 MEQ: 10 INJECTION, SOLUTION INTRAVENOUS at 11:02

## 2017-01-01 RX ADMIN — SODIUM CHLORIDE: 0.9 INJECTION, SOLUTION INTRAVENOUS at 09:02

## 2017-01-01 RX ADMIN — TBO-FILGRASTIM 300 MCG: 300 INJECTION, SOLUTION SUBCUTANEOUS at 02:03

## 2017-01-01 RX ADMIN — DEXTROSE MONOHYDRATE, SODIUM CHLORIDE, AND POTASSIUM CHLORIDE: 50; 4.5; 1.49 INJECTION, SOLUTION INTRAVENOUS at 11:02

## 2017-01-01 RX ADMIN — HYDROMORPHONE HYDROCHLORIDE 1 MG: 2 INJECTION, SOLUTION INTRAMUSCULAR; INTRAVENOUS; SUBCUTANEOUS at 04:03

## 2017-01-01 RX ADMIN — PIPERACILLIN SODIUM AND TAZOBACTAM SODIUM 4.5 G: 4; .5 INJECTION, POWDER, LYOPHILIZED, FOR SOLUTION INTRAVENOUS at 01:01

## 2017-01-01 RX ADMIN — HYDROMORPHONE HYDROCHLORIDE 1 MG: 1 INJECTION, SOLUTION INTRAMUSCULAR; INTRAVENOUS; SUBCUTANEOUS at 03:02

## 2017-01-01 RX ADMIN — MORPHINE SULFATE 30 MG: 15 TABLET, EXTENDED RELEASE ORAL at 09:03

## 2017-01-01 RX ADMIN — IPRATROPIUM BROMIDE AND ALBUTEROL SULFATE 3 ML: .5; 3 SOLUTION RESPIRATORY (INHALATION) at 03:03

## 2017-01-01 RX ADMIN — SODIUM CHLORIDE: 0.9 INJECTION, SOLUTION INTRAVENOUS at 10:01

## 2017-01-01 RX ADMIN — PYRIDOXINE HCL TAB 50 MG 100 MG: 50 TAB at 08:02

## 2017-01-01 RX ADMIN — DEXAMETHASONE 8 MG: 4 TABLET ORAL at 09:02

## 2017-01-01 RX ADMIN — GABAPENTIN 300 MG: 300 CAPSULE ORAL at 05:03

## 2017-01-01 RX ADMIN — HYDROMORPHONE HYDROCHLORIDE 1 MG: 2 INJECTION, SOLUTION INTRAMUSCULAR; INTRAVENOUS; SUBCUTANEOUS at 12:03

## 2017-01-01 RX ADMIN — HYDROMORPHONE HYDROCHLORIDE 1 MG: 2 INJECTION, SOLUTION INTRAMUSCULAR; INTRAVENOUS; SUBCUTANEOUS at 03:03

## 2017-01-01 RX ADMIN — GABAPENTIN 300 MG: 300 CAPSULE ORAL at 05:02

## 2017-01-01 RX ADMIN — OXYCODONE HYDROCHLORIDE 15 MG: 5 TABLET ORAL at 07:02

## 2017-01-01 RX ADMIN — SODIUM CHLORIDE: 0.9 INJECTION, SOLUTION INTRAVENOUS at 11:03

## 2017-01-01 RX ADMIN — MORPHINE SULFATE 2 MG: 2 INJECTION, SOLUTION INTRAMUSCULAR; INTRAVENOUS at 09:01

## 2017-01-01 RX ADMIN — HYDROMORPHONE HYDROCHLORIDE 1 MG: 1 INJECTION, SOLUTION INTRAMUSCULAR; INTRAVENOUS; SUBCUTANEOUS at 08:02

## 2017-01-01 RX ADMIN — OXYCODONE HYDROCHLORIDE 15 MG: 5 TABLET ORAL at 01:02

## 2017-01-01 RX ADMIN — PALONOSETRON HYDROCHLORIDE 0.25 MG: 0.25 INJECTION INTRAVENOUS at 11:02

## 2017-01-01 RX ADMIN — MORPHINE SULFATE 30 MG: 15 TABLET, EXTENDED RELEASE ORAL at 09:02

## 2017-01-01 RX ADMIN — PROMETHAZINE HYDROCHLORIDE 12.5 MG: 25 INJECTION, SOLUTION INTRAMUSCULAR; INTRAVENOUS at 09:03

## 2017-01-01 RX ADMIN — NICOTINE 1 PATCH: 14 PATCH, EXTENDED RELEASE TRANSDERMAL at 10:02

## 2017-01-01 RX ADMIN — HYDROMORPHONE HYDROCHLORIDE 2 MG: 2 INJECTION, SOLUTION INTRAMUSCULAR; INTRAVENOUS; SUBCUTANEOUS at 07:03

## 2017-01-01 RX ADMIN — HYDROMORPHONE HYDROCHLORIDE 1 MG: 2 INJECTION, SOLUTION INTRAMUSCULAR; INTRAVENOUS; SUBCUTANEOUS at 01:03

## 2017-01-01 RX ADMIN — POTASSIUM CHLORIDE 40 MEQ: 20 TABLET, EXTENDED RELEASE ORAL at 01:03

## 2017-01-01 RX ADMIN — SODIUM CHLORIDE: 0.9 INJECTION, SOLUTION INTRAVENOUS at 07:02

## 2017-01-01 RX ADMIN — METHYLPREDNISOLONE SODIUM SUCCINATE 40 MG: 40 INJECTION, POWDER, FOR SOLUTION INTRAMUSCULAR; INTRAVENOUS at 09:03

## 2017-01-01 RX ADMIN — IPRATROPIUM BROMIDE AND ALBUTEROL SULFATE 3 ML: .5; 3 SOLUTION RESPIRATORY (INHALATION) at 04:03

## 2017-01-01 RX ADMIN — OXYCODONE HYDROCHLORIDE 15 MG: 5 TABLET ORAL at 10:02

## 2017-01-01 RX ADMIN — VANCOMYCIN HYDROCHLORIDE 1000 MG: 1 INJECTION, POWDER, LYOPHILIZED, FOR SOLUTION INTRAVENOUS at 11:03

## 2017-01-01 RX ADMIN — PIPERACILLIN SODIUM AND TAZOBACTAM SODIUM 3.38 G: 3; .375 INJECTION, POWDER, LYOPHILIZED, FOR SOLUTION INTRAVENOUS at 09:03

## 2017-01-01 RX ADMIN — APIXABAN 5 MG: 2.5 TABLET, FILM COATED ORAL at 10:02

## 2017-01-01 RX ADMIN — HEPARIN SODIUM 5000 UNITS: 5000 INJECTION, SOLUTION INTRAVENOUS; SUBCUTANEOUS at 06:02

## 2017-01-01 RX ADMIN — DEXAMETHASONE 8 MG: 4 TABLET ORAL at 08:02

## 2017-01-01 RX ADMIN — MAGNESIUM SULFATE HEPTAHYDRATE 1 G: 500 INJECTION, SOLUTION INTRAMUSCULAR; INTRAVENOUS at 10:01

## 2017-01-01 RX ADMIN — PIPERACILLIN SODIUM AND TAZOBACTAM SODIUM 3.38 G: 3; .375 INJECTION, POWDER, LYOPHILIZED, FOR SOLUTION INTRAVENOUS at 05:03

## 2017-01-01 RX ADMIN — POTASSIUM CHLORIDE 10 MEQ: 10 INJECTION, SOLUTION INTRAVENOUS at 03:02

## 2017-01-01 RX ADMIN — MORPHINE SULFATE 4 MG: 4 INJECTION INTRAVENOUS at 08:01

## 2017-01-01 RX ADMIN — HYDROMORPHONE HYDROCHLORIDE 2 MG: 2 INJECTION, SOLUTION INTRAMUSCULAR; INTRAVENOUS; SUBCUTANEOUS at 07:02

## 2017-01-01 RX ADMIN — DEXTROSE MONOHYDRATE, SODIUM CHLORIDE, AND POTASSIUM CHLORIDE: 50; 4.5; 1.49 INJECTION, SOLUTION INTRAVENOUS at 04:02

## 2017-01-01 RX ADMIN — HYDROMORPHONE HYDROCHLORIDE 1 MG: 1 INJECTION, SOLUTION INTRAMUSCULAR; INTRAVENOUS; SUBCUTANEOUS at 01:02

## 2017-01-01 RX ADMIN — MAGNESIUM SULFATE HEPTAHYDRATE: 500 INJECTION, SOLUTION INTRAMUSCULAR; INTRAVENOUS at 09:03

## 2017-01-01 RX ADMIN — SODIUM CHLORIDE: 0.9 INJECTION, SOLUTION INTRAVENOUS at 08:02

## 2017-01-01 RX ADMIN — DEXTROSE MONOHYDRATE, SODIUM CHLORIDE, AND POTASSIUM CHLORIDE: 50; 4.5; 1.49 INJECTION, SOLUTION INTRAVENOUS at 05:02

## 2017-01-01 RX ADMIN — HEPARIN SODIUM 5000 UNITS: 5000 INJECTION, SOLUTION INTRAVENOUS; SUBCUTANEOUS at 01:02

## 2017-01-01 RX ADMIN — FENTANYL 1 PATCH: 75 PATCH, EXTENDED RELEASE TRANSDERMAL at 09:02

## 2017-01-01 RX ADMIN — HYDROMORPHONE HYDROCHLORIDE 1 MG: 2 INJECTION, SOLUTION INTRAMUSCULAR; INTRAVENOUS; SUBCUTANEOUS at 06:03

## 2017-01-01 RX ADMIN — HYDROMORPHONE HYDROCHLORIDE 2 MG: 2 INJECTION, SOLUTION INTRAMUSCULAR; INTRAVENOUS; SUBCUTANEOUS at 10:02

## 2017-01-01 RX ADMIN — HYDROMORPHONE HYDROCHLORIDE 2 MG: 2 INJECTION, SOLUTION INTRAMUSCULAR; INTRAVENOUS; SUBCUTANEOUS at 01:02

## 2017-01-01 RX ADMIN — IPRATROPIUM BROMIDE AND ALBUTEROL SULFATE 3 ML: .5; 3 SOLUTION RESPIRATORY (INHALATION) at 11:03

## 2017-01-01 RX ADMIN — ENOXAPARIN SODIUM 40 MG: 100 INJECTION SUBCUTANEOUS at 11:02

## 2017-01-01 RX ADMIN — LORAZEPAM 1 MG: 2 INJECTION, SOLUTION INTRAMUSCULAR; INTRAVENOUS at 06:03

## 2017-01-01 RX ADMIN — HYDROMORPHONE HYDROCHLORIDE 1 MG: 1 INJECTION, SOLUTION INTRAMUSCULAR; INTRAVENOUS; SUBCUTANEOUS at 02:02

## 2017-01-01 RX ADMIN — PIPERACILLIN SODIUM AND TAZOBACTAM SODIUM 4.5 G: 4; .5 INJECTION, POWDER, LYOPHILIZED, FOR SOLUTION INTRAVENOUS at 05:01

## 2017-01-01 RX ADMIN — SODIUM CHLORIDE: 0.9 INJECTION, SOLUTION INTRAVENOUS at 01:03

## 2017-01-01 RX ADMIN — MAGESIUM CITRATE 148 ML: 1.75 LIQUID ORAL at 01:02

## 2017-01-01 RX ADMIN — IOHEXOL 80 ML: 350 INJECTION, SOLUTION INTRAVENOUS at 08:03

## 2017-01-01 RX ADMIN — HYDROMORPHONE HYDROCHLORIDE 1 MG: 2 INJECTION INTRAMUSCULAR; INTRAVENOUS; SUBCUTANEOUS at 12:01

## 2017-01-01 RX ADMIN — DIPHENHYDRAMINE HYDROCHLORIDE 50 MG: 50 INJECTION, SOLUTION INTRAMUSCULAR; INTRAVENOUS at 11:02

## 2017-01-01 RX ADMIN — ALPRAZOLAM 1 MG: 1 TABLET ORAL at 07:01

## 2017-01-01 RX ADMIN — METHYLPREDNISOLONE SODIUM SUCCINATE 40 MG: 40 INJECTION, POWDER, FOR SOLUTION INTRAMUSCULAR; INTRAVENOUS at 11:03

## 2017-01-01 RX ADMIN — HYDROMORPHONE HYDROCHLORIDE 2 MG: 2 INJECTION, SOLUTION INTRAMUSCULAR; INTRAVENOUS; SUBCUTANEOUS at 09:02

## 2017-01-01 RX ADMIN — VANCOMYCIN HYDROCHLORIDE 1000 MG: 1 INJECTION, POWDER, LYOPHILIZED, FOR SOLUTION INTRAVENOUS at 12:03

## 2017-01-01 RX ADMIN — ONDANSETRON 4 MG: 2 INJECTION INTRAMUSCULAR; INTRAVENOUS at 11:02

## 2017-01-01 RX ADMIN — FENTANYL TRANSDERMAL 1 PATCH: 75 PATCH, EXTENDED RELEASE TRANSDERMAL at 02:03

## 2017-01-01 RX ADMIN — OXYCODONE HYDROCHLORIDE 15 MG: 5 TABLET ORAL at 03:02

## 2017-01-01 RX ADMIN — VITAMIN D, TAB 1000IU (100/BT) 1000 UNITS: 25 TAB at 08:01

## 2017-01-01 RX ADMIN — POLYETHYLENE GLYCOL 3350 17 G: 17 POWDER, FOR SOLUTION ORAL at 08:02

## 2017-01-01 RX ADMIN — MORPHINE SULFATE 2 MG: 2 INJECTION, SOLUTION INTRAMUSCULAR; INTRAVENOUS at 02:01

## 2017-01-01 RX ADMIN — CIPROFLOXACIN 400 MG: 2 INJECTION, SOLUTION INTRAVENOUS at 12:03

## 2017-01-01 RX ADMIN — GABAPENTIN 300 MG: 300 CAPSULE ORAL at 06:03

## 2017-01-01 RX ADMIN — METOCLOPRAMIDE 5 MG: 5 INJECTION, SOLUTION INTRAMUSCULAR; INTRAVENOUS at 08:02

## 2017-01-01 RX ADMIN — ACETAMINOPHEN 650 MG: 325 TABLET ORAL at 08:02

## 2017-01-01 RX ADMIN — ACETAMINOPHEN 650 MG: 325 TABLET, FILM COATED ORAL at 12:03

## 2017-01-01 RX ADMIN — ONDANSETRON 4 MG: 2 INJECTION INTRAMUSCULAR; INTRAVENOUS at 10:02

## 2017-01-01 RX ADMIN — HYDROMORPHONE HYDROCHLORIDE 2 MG: 2 INJECTION, SOLUTION INTRAMUSCULAR; INTRAVENOUS; SUBCUTANEOUS at 04:02

## 2017-01-01 RX ADMIN — KETAMINE HYDROCHLORIDE 30 MG: 100 INJECTION, SOLUTION, CONCENTRATE INTRAMUSCULAR; INTRAVENOUS at 11:01

## 2017-01-01 RX ADMIN — LORAZEPAM 1 MG: 2 INJECTION INTRAMUSCULAR; INTRAVENOUS at 06:03

## 2017-01-01 RX ADMIN — HYDROMORPHONE HYDROCHLORIDE 2 MG: 2 INJECTION, SOLUTION INTRAMUSCULAR; INTRAVENOUS; SUBCUTANEOUS at 02:02

## 2017-01-01 RX ADMIN — CYANOCOBALAMIN TAB 250 MCG 250 MCG: 250 TAB at 09:02

## 2017-01-01 RX ADMIN — FLUCONAZOLE 100 MG: 100 TABLET ORAL at 08:03

## 2017-01-01 RX ADMIN — SODIUM CHLORIDE, PRESERVATIVE FREE 10 ML: 5 INJECTION INTRAVENOUS at 04:02

## 2017-01-01 RX ADMIN — ENOXAPARIN SODIUM 80 MG: 100 INJECTION SUBCUTANEOUS at 04:02

## 2017-01-01 RX ADMIN — HYDROMORPHONE HYDROCHLORIDE 2 MG: 2 INJECTION, SOLUTION INTRAMUSCULAR; INTRAVENOUS; SUBCUTANEOUS at 12:02

## 2017-01-01 RX ADMIN — CLOTRIMAZOLE 10 MG: 10 LOZENGE ORAL at 05:02

## 2017-01-01 RX ADMIN — NICOTINE 1 PATCH: 14 PATCH, EXTENDED RELEASE TRANSDERMAL at 08:02

## 2017-01-01 RX ADMIN — LACTULOSE 10 G: 10 SOLUTION ORAL at 05:01

## 2017-01-01 RX ADMIN — IPRATROPIUM BROMIDE AND ALBUTEROL SULFATE 3 ML: .5; 3 SOLUTION RESPIRATORY (INHALATION) at 08:03

## 2017-01-01 RX ADMIN — PANTOPRAZOLE SODIUM 40 MG: 40 INJECTION, POWDER, FOR SOLUTION INTRAVENOUS at 08:03

## 2017-01-01 RX ADMIN — HYDROMORPHONE HYDROCHLORIDE 1 MG: 2 INJECTION, SOLUTION INTRAMUSCULAR; INTRAVENOUS; SUBCUTANEOUS at 07:03

## 2017-01-01 RX ADMIN — AZITHROMYCIN 500 MG: 250 TABLET, FILM COATED ORAL at 12:03

## 2017-01-01 RX ADMIN — MAGNESIUM SULFATE IN WATER 2 G: 40 INJECTION, SOLUTION INTRAVENOUS at 10:02

## 2017-01-01 RX ADMIN — GABAPENTIN 300 MG: 300 CAPSULE ORAL at 02:02

## 2017-01-01 RX ADMIN — APIXABAN 5 MG: 2.5 TABLET, FILM COATED ORAL at 08:03

## 2017-01-01 RX ADMIN — DEXTROSE MONOHYDRATE, SODIUM CHLORIDE, AND POTASSIUM CHLORIDE: 50; 4.5; 1.49 INJECTION, SOLUTION INTRAVENOUS at 03:02

## 2017-01-01 RX ADMIN — HEPARIN SODIUM 5000 UNITS: 5000 INJECTION, SOLUTION INTRAVENOUS; SUBCUTANEOUS at 05:02

## 2017-01-01 RX ADMIN — ENOXAPARIN SODIUM 40 MG: 100 INJECTION SUBCUTANEOUS at 02:02

## 2017-01-01 RX ADMIN — SODIUM CHLORIDE 1000 ML: 0.9 INJECTION, SOLUTION INTRAVENOUS at 01:02

## 2017-01-01 RX ADMIN — FAMOTIDINE 20 MG: 10 INJECTION INTRAVENOUS at 11:02

## 2017-01-01 RX ADMIN — PIPERACILLIN AND TAZOBACTAM 3.38 G: 3; .375 INJECTION, POWDER, LYOPHILIZED, FOR SOLUTION INTRAVENOUS; PARENTERAL at 10:03

## 2017-01-01 RX ADMIN — OXYCODONE HYDROCHLORIDE 10 MG: 5 TABLET ORAL at 10:03

## 2017-01-01 RX ADMIN — AMITRIPTYLINE HYDROCHLORIDE 50 MG: 50 TABLET, FILM COATED ORAL at 08:03

## 2017-01-01 RX ADMIN — IOHEXOL 75 ML: 350 INJECTION, SOLUTION INTRAVENOUS at 02:01

## 2017-01-01 RX ADMIN — METHYLPREDNISOLONE SODIUM SUCCINATE 40 MG: 40 INJECTION, POWDER, FOR SOLUTION INTRAMUSCULAR; INTRAVENOUS at 06:03

## 2017-01-01 RX ADMIN — HYDROMORPHONE HYDROCHLORIDE 1 MG: 1 INJECTION, SOLUTION INTRAMUSCULAR; INTRAVENOUS; SUBCUTANEOUS at 10:01

## 2017-01-01 RX ADMIN — Medication 100 MG: at 08:02

## 2017-01-01 RX ADMIN — NICOTINE 1 PATCH: 14 PATCH, EXTENDED RELEASE TRANSDERMAL at 09:01

## 2017-01-01 RX ADMIN — HYDROMORPHONE HYDROCHLORIDE 2 MG: 2 INJECTION, SOLUTION INTRAMUSCULAR; INTRAVENOUS; SUBCUTANEOUS at 10:03

## 2017-01-01 RX ADMIN — HEPARIN SODIUM 5000 UNITS: 5000 INJECTION, SOLUTION INTRAVENOUS; SUBCUTANEOUS at 09:02

## 2017-01-01 RX ADMIN — CLOTRIMAZOLE 10 MG: 10 LOZENGE ORAL at 02:02

## 2017-01-01 RX ADMIN — IPRATROPIUM BROMIDE AND ALBUTEROL SULFATE 3 ML: .5; 3 SOLUTION RESPIRATORY (INHALATION) at 12:03

## 2017-01-01 RX ADMIN — MORPHINE SULFATE 6 MG: 2 INJECTION, SOLUTION INTRAMUSCULAR; INTRAVENOUS at 10:03

## 2017-01-01 RX ADMIN — VANCOMYCIN HYDROCHLORIDE 1000 MG: 1 INJECTION, POWDER, LYOPHILIZED, FOR SOLUTION INTRAVENOUS at 08:03

## 2017-01-01 RX ADMIN — SODIUM CHLORIDE: 0.9 INJECTION, SOLUTION INTRAVENOUS at 05:01

## 2017-01-01 RX ADMIN — MORPHINE SULFATE 30 MG: 15 TABLET, EXTENDED RELEASE ORAL at 08:01

## 2017-01-01 RX ADMIN — ENOXAPARIN SODIUM 40 MG: 100 INJECTION SUBCUTANEOUS at 12:02

## 2017-01-01 RX ADMIN — GABAPENTIN 300 MG: 300 CAPSULE ORAL at 02:01

## 2017-01-01 RX ADMIN — PROPOFOL 175 MCG/KG/MIN: 10 INJECTION, EMULSION INTRAVENOUS at 11:01

## 2017-01-01 RX ADMIN — PIPERACILLIN SODIUM AND TAZOBACTAM SODIUM 4.5 G: 4; .5 INJECTION, POWDER, FOR SOLUTION INTRAVENOUS at 09:01

## 2017-01-01 RX ADMIN — SODIUM CHLORIDE: 0.9 INJECTION, SOLUTION INTRAVENOUS at 07:03

## 2017-01-01 RX ADMIN — SODIUM CHLORIDE: 0.9 INJECTION, SOLUTION INTRAVENOUS at 04:02

## 2017-01-01 RX ADMIN — ALBUTEROL SULFATE 10 MG: 2.5 SOLUTION RESPIRATORY (INHALATION) at 07:03

## 2017-01-01 RX ADMIN — HYDROMORPHONE HYDROCHLORIDE 1 MG: 2 INJECTION INTRAMUSCULAR; INTRAVENOUS; SUBCUTANEOUS at 07:01

## 2017-01-01 RX ADMIN — HYDROMORPHONE HYDROCHLORIDE 1 MG: 2 INJECTION, SOLUTION INTRAMUSCULAR; INTRAVENOUS; SUBCUTANEOUS at 09:03

## 2017-01-01 RX ADMIN — OXYCODONE HYDROCHLORIDE 15 MG: 5 TABLET ORAL at 05:02

## 2017-01-01 RX ADMIN — ACETAMINOPHEN 650 MG: 325 TABLET ORAL at 06:02

## 2017-01-01 RX ADMIN — MORPHINE SULFATE 4 MG: 4 INJECTION INTRAVENOUS at 10:01

## 2017-01-01 RX ADMIN — APIXABAN 5 MG: 2.5 TABLET, FILM COATED ORAL at 09:03

## 2017-01-01 RX ADMIN — MORPHINE SULFATE 30 MG: 15 TABLET, EXTENDED RELEASE ORAL at 08:03

## 2017-01-01 RX ADMIN — MORPHINE SULFATE 4 MG: 4 INJECTION INTRAVENOUS at 02:01

## 2017-01-01 RX ADMIN — GADOBUTROL 7 ML: 604.72 INJECTION INTRAVENOUS at 05:02

## 2017-01-01 RX ADMIN — OXYCODONE HYDROCHLORIDE 10 MG: 5 TABLET ORAL at 03:03

## 2017-01-01 RX ADMIN — ONDANSETRON 8 MG: 2 INJECTION INTRAMUSCULAR; INTRAVENOUS at 12:03

## 2017-01-01 RX ADMIN — Medication: at 09:03

## 2017-01-01 RX ADMIN — MAGNESIUM SULFATE IN WATER 2 G: 40 INJECTION, SOLUTION INTRAVENOUS at 10:01

## 2017-01-01 RX ADMIN — SODIUM CHLORIDE 4 MG: 9 INJECTION, SOLUTION INTRAVENOUS at 03:01

## 2017-01-01 RX ADMIN — OXYCODONE HYDROCHLORIDE 15 MG: 5 TABLET ORAL at 09:02

## 2017-01-01 RX ADMIN — TBO-FILGRASTIM 300 MCG: 300 INJECTION, SOLUTION SUBCUTANEOUS at 04:03

## 2017-01-01 RX ADMIN — SODIUM CHLORIDE: 0.9 INJECTION, SOLUTION INTRAVENOUS at 02:01

## 2017-01-01 RX ADMIN — HYDROMORPHONE HYDROCHLORIDE 2 MG: 2 INJECTION, SOLUTION INTRAMUSCULAR; INTRAVENOUS; SUBCUTANEOUS at 02:03

## 2017-01-01 RX ADMIN — PROMETHAZINE HYDROCHLORIDE 6.25 MG: 25 INJECTION INTRAMUSCULAR; INTRAVENOUS at 01:02

## 2017-01-01 RX ADMIN — GADOBUTROL 10 ML: 604.72 INJECTION INTRAVENOUS at 01:01

## 2017-01-01 RX ADMIN — PROMETHAZINE HYDROCHLORIDE 25 MG: 25 INJECTION INTRAMUSCULAR; INTRAVENOUS at 11:02

## 2017-01-01 RX ADMIN — GABAPENTIN 300 MG: 300 CAPSULE ORAL at 11:01

## 2017-01-01 RX ADMIN — OXYCODONE HYDROCHLORIDE 10 MG: 5 TABLET ORAL at 03:01

## 2017-01-01 RX ADMIN — SODIUM CHLORIDE: 0.9 INJECTION, SOLUTION INTRAVENOUS at 09:01

## 2017-01-01 RX ADMIN — HYDROMORPHONE HYDROCHLORIDE 1 MG: 1 INJECTION, SOLUTION INTRAMUSCULAR; INTRAVENOUS; SUBCUTANEOUS at 07:02

## 2017-01-01 RX ADMIN — Medication 100 MG: at 10:02

## 2017-01-01 RX ADMIN — ALBUTEROL SULFATE 5 MG: 2.5 SOLUTION RESPIRATORY (INHALATION) at 05:03

## 2017-01-01 RX ADMIN — NICOTINE 1 PATCH: 14 PATCH, EXTENDED RELEASE TRANSDERMAL at 08:01

## 2017-01-01 RX ADMIN — DOCUSATE SODIUM 100 MG: 100 CAPSULE, LIQUID FILLED ORAL at 08:01

## 2017-01-01 RX ADMIN — CARBOPLATIN 900 MG: 10 INJECTION, SOLUTION INTRAVENOUS at 03:02

## 2017-01-01 RX ADMIN — SODIUM CHLORIDE 1000 ML: 0.9 INJECTION, SOLUTION INTRAVENOUS at 11:02

## 2017-01-01 RX ADMIN — HYDROMORPHONE HYDROCHLORIDE 2 MG: 2 INJECTION, SOLUTION INTRAMUSCULAR; INTRAVENOUS; SUBCUTANEOUS at 06:02

## 2017-01-01 RX ADMIN — Medication 1 G: at 10:03

## 2017-01-01 RX ADMIN — PROMETHAZINE HYDROCHLORIDE 25 MG: 25 INJECTION INTRAMUSCULAR; INTRAVENOUS at 06:02

## 2017-01-01 RX ADMIN — CIPROFLOXACIN 400 MG: 2 INJECTION, SOLUTION INTRAVENOUS at 01:03

## 2017-01-01 RX ADMIN — VANCOMYCIN HYDROCHLORIDE 1000 MG: 1 INJECTION, POWDER, LYOPHILIZED, FOR SOLUTION INTRAVENOUS at 06:03

## 2017-01-01 RX ADMIN — SODIUM CHLORIDE: 0.9 INJECTION, SOLUTION INTRAVENOUS at 01:02

## 2017-01-01 RX ADMIN — DOCUSATE SODIUM 100 MG: 100 CAPSULE, LIQUID FILLED ORAL at 09:01

## 2017-01-01 RX ADMIN — LOPERAMIDE HYDROCHLORIDE 2 MG: 2 CAPSULE ORAL at 06:02

## 2017-01-01 RX ADMIN — CIPROFLOXACIN 400 MG: 2 INJECTION, SOLUTION INTRAVENOUS at 11:03

## 2017-01-01 RX ADMIN — SODIUM CHLORIDE: 0.9 INJECTION, SOLUTION INTRAVENOUS at 11:01

## 2017-01-01 RX ADMIN — FOLIC ACID: 5 INJECTION, SOLUTION INTRAMUSCULAR; INTRAVENOUS; SUBCUTANEOUS at 02:02

## 2017-01-01 RX ADMIN — FENTANYL 1 PATCH: 100 PATCH TRANSDERMAL at 03:02

## 2017-01-01 RX ADMIN — POTASSIUM CHLORIDE 40 MEQ: 20 SOLUTION ORAL at 10:03

## 2017-01-01 RX ADMIN — IOHEXOL 75 ML: 350 INJECTION, SOLUTION INTRAVENOUS at 02:02

## 2017-01-01 RX ADMIN — POLYETHYLENE GLYCOL 3350 17 G: 17 POWDER, FOR SOLUTION ORAL at 09:02

## 2017-01-01 RX ADMIN — AMITRIPTYLINE HYDROCHLORIDE 50 MG: 50 TABLET, FILM COATED ORAL at 09:03

## 2017-01-01 RX ADMIN — HYDROMORPHONE HYDROCHLORIDE 1 MG: 1 INJECTION, SOLUTION INTRAMUSCULAR; INTRAVENOUS; SUBCUTANEOUS at 11:01

## 2017-01-01 RX ADMIN — ENOXAPARIN SODIUM 80 MG: 100 INJECTION SUBCUTANEOUS at 03:02

## 2017-01-01 RX ADMIN — GABAPENTIN 300 MG: 300 CAPSULE ORAL at 06:01

## 2017-01-01 RX ADMIN — IOHEXOL 75 ML: 350 INJECTION, SOLUTION INTRAVENOUS at 10:03

## 2017-01-01 RX ADMIN — HYDROMORPHONE HYDROCHLORIDE 1 MG: 1 INJECTION, SOLUTION INTRAMUSCULAR; INTRAVENOUS; SUBCUTANEOUS at 09:03

## 2017-01-01 RX ADMIN — PIPERACILLIN SODIUM AND TAZOBACTAM SODIUM 4.5 G: 4; .5 INJECTION, POWDER, LYOPHILIZED, FOR SOLUTION INTRAVENOUS at 06:01

## 2017-01-01 RX ADMIN — METHYLPREDNISOLONE SODIUM SUCCINATE 40 MG: 40 INJECTION, POWDER, FOR SOLUTION INTRAMUSCULAR; INTRAVENOUS at 12:03

## 2017-01-01 RX ADMIN — METHYLPREDNISOLONE SODIUM SUCCINATE 40 MG: 40 INJECTION, POWDER, FOR SOLUTION INTRAMUSCULAR; INTRAVENOUS at 08:03

## 2017-01-01 RX ADMIN — ALPRAZOLAM 1 MG: 1 TABLET ORAL at 11:01

## 2017-01-01 RX ADMIN — HEPARIN 500 UNITS: 100 SYRINGE at 06:03

## 2017-01-01 RX ADMIN — SODIUM CHLORIDE 1000 ML: 0.9 INJECTION, SOLUTION INTRAVENOUS at 08:03

## 2017-01-01 RX ADMIN — Medication 1000 MG: at 08:03

## 2017-01-01 RX ADMIN — HYDROMORPHONE HYDROCHLORIDE 2 MG: 2 INJECTION, SOLUTION INTRAMUSCULAR; INTRAVENOUS; SUBCUTANEOUS at 11:03

## 2017-01-01 RX ADMIN — HYDROMORPHONE HYDROCHLORIDE 2 MG: 2 INJECTION, SOLUTION INTRAMUSCULAR; INTRAVENOUS; SUBCUTANEOUS at 08:02

## 2017-01-01 RX ADMIN — IPRATROPIUM BROMIDE AND ALBUTEROL SULFATE 3 ML: .5; 3 SOLUTION RESPIRATORY (INHALATION) at 01:01

## 2017-01-01 RX ADMIN — HYDROMORPHONE HYDROCHLORIDE 1 MG: 2 INJECTION INTRAMUSCULAR; INTRAVENOUS; SUBCUTANEOUS at 06:01

## 2017-01-01 RX ADMIN — VANCOMYCIN HYDROCHLORIDE 1250 MG: 1 INJECTION, POWDER, LYOPHILIZED, FOR SOLUTION INTRAVENOUS at 10:03

## 2017-01-01 RX ADMIN — SODIUM CHLORIDE: 0.9 INJECTION, SOLUTION INTRAVENOUS at 12:01

## 2017-01-01 RX ADMIN — DOCUSATE SODIUM 100 MG: 100 CAPSULE, LIQUID FILLED ORAL at 01:03

## 2017-01-01 RX ADMIN — ACETAMINOPHEN 650 MG: 325 TABLET ORAL at 09:02

## 2017-01-01 RX ADMIN — SODIUM CHLORIDE, SODIUM LACTATE, POTASSIUM CHLORIDE, AND CALCIUM CHLORIDE 1000 ML: .6; .31; .03; .02 INJECTION, SOLUTION INTRAVENOUS at 09:01

## 2017-01-01 RX ADMIN — THERA TABS 1 TABLET: TAB at 02:02

## 2017-01-01 RX ADMIN — POTASSIUM CHLORIDE 40 MEQ: 20 TABLET, EXTENDED RELEASE ORAL at 10:03

## 2017-01-01 RX ADMIN — FENTANYL TRANSDERMAL 1 PATCH: 75 PATCH, EXTENDED RELEASE TRANSDERMAL at 03:03

## 2017-01-01 RX ADMIN — OXYCODONE HYDROCHLORIDE 10 MG: 5 TABLET ORAL at 02:03

## 2017-01-01 RX ADMIN — VANCOMYCIN HYDROCHLORIDE 1000 MG: 1 INJECTION, POWDER, LYOPHILIZED, FOR SOLUTION INTRAVENOUS at 02:03

## 2017-01-01 RX ADMIN — MORPHINE SULFATE 20 MG/HR: 10 INJECTION INTRAVENOUS at 08:03

## 2017-01-01 RX ADMIN — STANDARDIZED SENNA CONCENTRATE AND DOCUSATE SODIUM 1 TABLET: 8.6; 5 TABLET, FILM COATED ORAL at 08:01

## 2017-01-01 RX ADMIN — ONDANSETRON 4 MG: 2 INJECTION INTRAMUSCULAR; INTRAVENOUS at 01:02

## 2017-01-01 RX ADMIN — DIAZEPAM 10 MG: 5 INJECTION, SOLUTION INTRAMUSCULAR; INTRAVENOUS at 07:01

## 2017-01-01 RX ADMIN — Medication 400 MG: at 12:03

## 2017-01-01 RX ADMIN — LOPERAMIDE HYDROCHLORIDE 2 MG: 2 CAPSULE ORAL at 03:02

## 2017-01-01 RX ADMIN — CYANOCOBALAMIN TAB 250 MCG 250 MCG: 250 TAB at 10:02

## 2017-01-01 RX ADMIN — LORAZEPAM 1 MG: 2 INJECTION, SOLUTION INTRAMUSCULAR; INTRAVENOUS at 07:03

## 2017-01-01 RX ADMIN — PYRIDOXINE HCL TAB 50 MG 100 MG: 50 TAB at 09:02

## 2017-01-01 RX ADMIN — SODIUM CHLORIDE: 0.9 INJECTION, SOLUTION INTRAVENOUS at 11:02

## 2017-01-01 RX ADMIN — MAGNESIUM SULFATE IN WATER 2 G: 40 INJECTION, SOLUTION INTRAVENOUS at 07:01

## 2017-01-01 RX ADMIN — POTASSIUM CHLORIDE 40 MEQ: 20 TABLET, EXTENDED RELEASE ORAL at 12:03

## 2017-01-01 RX ADMIN — PROPOFOL 40 MG: 10 INJECTION, EMULSION INTRAVENOUS at 11:01

## 2017-01-01 RX ADMIN — Medication 100 MG: at 09:02

## 2017-01-01 RX ADMIN — IPRATROPIUM BROMIDE AND ALBUTEROL SULFATE 3 ML: .5; 3 SOLUTION RESPIRATORY (INHALATION) at 07:01

## 2017-01-01 RX ADMIN — IOHEXOL 100 ML: 350 INJECTION, SOLUTION INTRAVENOUS at 08:01

## 2017-01-01 RX ADMIN — CLOTRIMAZOLE 10 MG: 10 LOZENGE ORAL at 09:02

## 2017-01-01 RX ADMIN — DEXTROSE MONOHYDRATE, SODIUM CHLORIDE, AND POTASSIUM CHLORIDE: 50; 4.5; 1.49 INJECTION, SOLUTION INTRAVENOUS at 12:02

## 2017-01-01 RX ADMIN — PIPERACILLIN SODIUM AND TAZOBACTAM SODIUM 3.38 G: 3; .375 INJECTION, POWDER, LYOPHILIZED, FOR SOLUTION INTRAVENOUS at 03:03

## 2017-01-01 RX ADMIN — POLYETHYLENE GLYCOL 3350 17 G: 17 POWDER, FOR SOLUTION ORAL at 10:02

## 2017-01-01 RX ADMIN — HYDROMORPHONE HYDROCHLORIDE: 2 INJECTION, SOLUTION INTRAMUSCULAR; INTRAVENOUS; SUBCUTANEOUS at 01:03

## 2017-01-01 RX ADMIN — OXYCODONE HYDROCHLORIDE AND ACETAMINOPHEN 1 TABLET: 5; 325 TABLET ORAL at 05:01

## 2017-01-01 RX ADMIN — Medication 400 MG: at 09:03

## 2017-01-01 RX ADMIN — STANDARDIZED SENNA CONCENTRATE AND DOCUSATE SODIUM 1 TABLET: 8.6; 5 TABLET, FILM COATED ORAL at 10:02

## 2017-01-01 RX ADMIN — MIDAZOLAM HYDROCHLORIDE 2 MG: 1 INJECTION, SOLUTION INTRAMUSCULAR; INTRAVENOUS at 11:01

## 2017-01-01 RX ADMIN — PIPERACILLIN SODIUM AND TAZOBACTAM SODIUM 4.5 G: 4; .5 INJECTION, POWDER, LYOPHILIZED, FOR SOLUTION INTRAVENOUS at 02:01

## 2017-01-01 RX ADMIN — POTASSIUM CHLORIDE 40 MEQ: 400 INJECTION, SOLUTION INTRAVENOUS at 05:03

## 2017-01-01 RX ADMIN — FENTANYL 1 PATCH: 75 PATCH TRANSDERMAL at 12:02

## 2017-01-01 RX ADMIN — MORPHINE SULFATE 30 MG: 15 TABLET, EXTENDED RELEASE ORAL at 10:02

## 2017-01-01 RX ADMIN — HYDROMORPHONE HYDROCHLORIDE 2 MG: 2 INJECTION, SOLUTION INTRAMUSCULAR; INTRAVENOUS; SUBCUTANEOUS at 05:02

## 2017-01-01 RX ADMIN — DIPHENHYDRAMINE HYDROCHLORIDE 25 MG: 25 CAPSULE ORAL at 12:03

## 2017-01-01 RX ADMIN — DEXAMETHASONE 8 MG: 4 TABLET ORAL at 10:03

## 2017-01-01 RX ADMIN — GABAPENTIN 300 MG: 300 CAPSULE ORAL at 02:03

## 2017-01-01 RX ADMIN — MAGNESIUM SULFATE HEPTAHYDRATE 3 G: 500 INJECTION, SOLUTION INTRAMUSCULAR; INTRAVENOUS at 09:03

## 2017-01-01 RX ADMIN — HYDROMORPHONE HYDROCHLORIDE 2 MG: 2 INJECTION, SOLUTION INTRAMUSCULAR; INTRAVENOUS; SUBCUTANEOUS at 04:03

## 2017-01-01 RX ADMIN — DOCUSATE SODIUM 100 MG: 100 CAPSULE, LIQUID FILLED ORAL at 08:03

## 2017-01-01 RX ADMIN — STANDARDIZED SENNA CONCENTRATE AND DOCUSATE SODIUM 1 TABLET: 8.6; 5 TABLET, FILM COATED ORAL at 02:02

## 2017-01-01 RX ADMIN — MORPHINE SULFATE 6 MG: 2 INJECTION, SOLUTION INTRAMUSCULAR; INTRAVENOUS at 08:03

## 2017-01-01 RX ADMIN — GABAPENTIN 300 MG: 300 CAPSULE ORAL at 04:03

## 2017-01-01 RX ADMIN — HYDROMORPHONE HYDROCHLORIDE 1 MG: 2 INJECTION INTRAMUSCULAR; INTRAVENOUS; SUBCUTANEOUS at 08:01

## 2017-01-01 RX ADMIN — OXYCODONE HYDROCHLORIDE AND ACETAMINOPHEN 1 TABLET: 5; 325 TABLET ORAL at 07:01

## 2017-01-01 RX ADMIN — IPRATROPIUM BROMIDE AND ALBUTEROL SULFATE 3 ML: .5; 3 SOLUTION RESPIRATORY (INHALATION) at 01:03

## 2017-01-01 RX ADMIN — APIXABAN 5 MG: 2.5 TABLET, FILM COATED ORAL at 10:03

## 2017-01-01 RX ADMIN — HYDROMORPHONE HYDROCHLORIDE 1 MG: 2 INJECTION INTRAMUSCULAR; INTRAVENOUS; SUBCUTANEOUS at 03:01

## 2017-01-01 RX ADMIN — OXYCODONE HYDROCHLORIDE 15 MG: 5 TABLET ORAL at 08:02

## 2017-01-01 RX ADMIN — CLOTRIMAZOLE 10 MG: 10 LOZENGE ORAL at 07:02

## 2017-01-01 RX ADMIN — HYDROMORPHONE HYDROCHLORIDE 1 MG: 2 INJECTION, SOLUTION INTRAMUSCULAR; INTRAVENOUS; SUBCUTANEOUS at 02:03

## 2017-01-01 RX ADMIN — MORPHINE SULFATE 2 MG: 2 INJECTION, SOLUTION INTRAMUSCULAR; INTRAVENOUS at 11:01

## 2017-01-01 RX ADMIN — HYDROMORPHONE HYDROCHLORIDE 1 MG: 2 INJECTION, SOLUTION INTRAMUSCULAR; INTRAVENOUS; SUBCUTANEOUS at 10:03

## 2017-01-01 RX ADMIN — DEXAMETHASONE 8 MG: 4 TABLET ORAL at 08:03

## 2017-01-01 RX ADMIN — POTASSIUM CHLORIDE 40 MEQ: 40 SOLUTION ORAL at 01:02

## 2017-01-01 RX ADMIN — MORPHINE SULFATE 4 MG/HR: 10 INJECTION INTRAVENOUS at 03:03

## 2017-01-01 RX ADMIN — POLYETHYLENE GLYCOL 3350 17 G: 17 POWDER, FOR SOLUTION ORAL at 08:01

## 2017-01-01 RX ADMIN — HYDROMORPHONE HYDROCHLORIDE 2 MG: 2 INJECTION, SOLUTION INTRAMUSCULAR; INTRAVENOUS; SUBCUTANEOUS at 11:02

## 2017-01-01 RX ADMIN — ENOXAPARIN SODIUM 40 MG: 100 INJECTION SUBCUTANEOUS at 11:01

## 2017-01-01 RX ADMIN — OXYCODONE HYDROCHLORIDE 10 MG: 5 TABLET ORAL at 05:03

## 2017-01-01 RX ADMIN — SODIUM CHLORIDE, PRESERVATIVE FREE 500 UNITS: 5 INJECTION INTRAVENOUS at 04:02

## 2017-01-01 RX ADMIN — MORPHINE SULFATE 2 MG: 2 INJECTION, SOLUTION INTRAMUSCULAR; INTRAVENOUS at 05:01

## 2017-01-01 RX ADMIN — OXYCODONE HYDROCHLORIDE 10 MG: 5 TABLET ORAL at 01:03

## 2017-01-01 RX ADMIN — MORPHINE SULFATE 30 MG: 15 TABLET, EXTENDED RELEASE ORAL at 09:01

## 2017-01-01 RX ADMIN — GABAPENTIN 300 MG: 300 CAPSULE ORAL at 11:03

## 2017-01-01 RX ADMIN — PANTOPRAZOLE SODIUM 40 MG: 40 INJECTION, POWDER, FOR SOLUTION INTRAVENOUS at 10:03

## 2017-01-01 RX ADMIN — IPRATROPIUM BROMIDE AND ALBUTEROL SULFATE 3 ML: .5; 3 SOLUTION RESPIRATORY (INHALATION) at 07:03

## 2017-01-01 RX ADMIN — HEPARIN SODIUM 5000 UNITS: 5000 INJECTION, SOLUTION INTRAVENOUS; SUBCUTANEOUS at 10:02

## 2017-01-01 RX ADMIN — PACLITAXEL 312 MG: 6 INJECTION, SOLUTION, CONCENTRATE INTRAVENOUS at 12:02

## 2017-01-01 RX ADMIN — SODIUM CHLORIDE 1000 ML: 0.9 INJECTION, SOLUTION INTRAVENOUS at 06:01

## 2017-01-01 RX ADMIN — NICOTINE 1 PATCH: 14 PATCH, EXTENDED RELEASE TRANSDERMAL at 02:02

## 2017-01-01 RX ADMIN — ENOXAPARIN SODIUM 40 MG: 100 INJECTION SUBCUTANEOUS at 01:01

## 2017-01-01 RX ADMIN — SODIUM CHLORIDE: 0.9 INJECTION, SOLUTION INTRAVENOUS at 06:02

## 2017-01-01 RX ADMIN — DEXTROSE MONOHYDRATE, SODIUM CHLORIDE, AND POTASSIUM CHLORIDE: 50; 4.5; 1.49 INJECTION, SOLUTION INTRAVENOUS at 09:02

## 2017-01-01 RX ADMIN — SODIUM CHLORIDE: 0.9 INJECTION, SOLUTION INTRAVENOUS at 06:03

## 2017-01-01 RX ADMIN — MAGNESIUM SULFATE IN WATER 2 G: 40 INJECTION, SOLUTION INTRAVENOUS at 02:02

## 2017-01-01 RX ADMIN — ONDANSETRON 4 MG: 2 INJECTION INTRAMUSCULAR; INTRAVENOUS at 12:02

## 2017-01-08 PROBLEM — K85.00 IDIOPATHIC ACUTE PANCREATITIS: Status: ACTIVE | Noted: 2017-01-01

## 2017-01-08 NOTE — ED PROVIDER NOTES
Encounter Date: 1/8/2017    SCRIBE #1 NOTE: I, Taylor Prescott , am scribing for, and in the presence of,  Dr. Mendes. I have scribed the entire note.       History     Chief Complaint   Patient presents with    Chest Pain     woke up with CP about one hour ago      Review of patient's allergies indicates:  No Known Allergies  HPI Comments:     01/08/2017  5:32 PM     Chief Complaint: CP      The patient is a 46 y.o. Female with a PMHx of neuropathy who is presenting with the acute onset of L lateral sided CP that began  X 2 hours ago. The pt reports that the pain is sharp, severe, and constant. The notes that she was sleeping when the pain first began and that it has worsened since its onset. Exertion exacerbates her pain while nothing has helped to relieve it. She denies taking anything for pain PTA. The pt also endorses nausea, frequent emesis, and generalized myalgia x 4 days. No past surgical hx on file. Pt confirms tobacco abuse.           The history is provided by the patient and medical records.     Past Medical History   Diagnosis Date    Neuropathy      No past medical history pertinent negatives.  No past surgical history on file.  History reviewed. No pertinent family history.  Social History   Substance Use Topics    Smoking status: Current Every Day Smoker     Packs/day: 0.50     Years: 15.00     Types: Cigarettes    Smokeless tobacco: None    Alcohol use No     Review of Systems   Constitutional: Negative for fever.   HENT: Negative for sore throat.    Eyes: Negative for visual disturbance.   Respiratory: Negative for shortness of breath.    Cardiovascular: Positive for chest pain.   Gastrointestinal: Positive for nausea and vomiting.   Genitourinary: Negative for dysuria.   Musculoskeletal: Positive for myalgias. Negative for back pain.   Skin: Negative for rash.   Neurological: Negative for weakness.   Hematological: Does not bruise/bleed easily.   Psychiatric/Behavioral: Negative for confusion.        Physical Exam   Initial Vitals   BP Pulse Resp Temp SpO2   01/08/17 1611 01/08/17 1611 01/08/17 1611 01/08/17 1611 01/08/17 1611   135/89 134 18 98.3 °F (36.8 °C) 96 %     Physical Exam    Nursing note and vitals reviewed.  Constitutional: She appears well-developed and well-nourished.   HENT:   Head: Normocephalic and atraumatic.   Mouth/Throat: Oropharynx is clear and moist.   Eyes: EOM are normal. Pupils are equal, round, and reactive to light.   Neck: Normal range of motion. Neck supple.   Cardiovascular: Regular rhythm, normal heart sounds and intact distal pulses. Exam reveals no gallop and no friction rub.    No murmur heard.  tachycardic    Pulmonary/Chest: Breath sounds normal. She has no wheezes. She has no rhonchi. She has no rales. She exhibits tenderness.   L lateral chest wall pain will palpation    Abdominal: Soft. There is tenderness.   Epigastric tenderness to palpation      Musculoskeletal: Normal range of motion. She exhibits no edema or tenderness.   Neurological: She is alert and oriented to person, place, and time. She has normal strength. No sensory deficit.   Skin: Skin is warm and dry.   Psychiatric: She has a normal mood and affect.         ED Course   Critical Care  Date/Time: 1/8/2017 9:39 PM  Performed by: ADRIENNE PRICE  Authorized by: ADRIENNE PRICE   Direct patient critical care time: 15 minutes  Ordering / reviewing critical care time: 13 minutes  Documentation critical care time: 11 minutes  Consulting other physicians critical care time: 9 minutes  Consult with family critical care time: 12 minutes  Total critical care time (exclusive of procedural time) : 60 minutes  Critical care was time spent personally by me on the following activities: obtaining history from patient or surrogate, development of treatment plan with patient or surrogate, ordering and performing treatments and interventions, re-evaluation of patient's condition, evaluation of patient's response to  treatment, ordering and review of laboratory studies, examination of patient and ordering and review of radiographic studies.        Labs Reviewed   CBC W/ AUTO DIFFERENTIAL - Abnormal; Notable for the following:        Result Value    WBC 13.80 (*)     Platelets 399 (*)     MPV 9.1 (*)     Gran # 10.9 (*)     Gran% 78.9 (*)     Lymph% 13.9 (*)     All other components within normal limits    Narrative:     PLEASE REVIEW ORDER START TIME BEFORE MARKING SPECIMEN  COLLECTED.   COMPREHENSIVE METABOLIC PANEL - Abnormal; Notable for the following:     Sodium 135 (*)     Glucose 130 (*)     Calcium 11.6 (*)     Albumin 2.8 (*)     Total Bilirubin 4.2 (*)     Alkaline Phosphatase 1927 (*)      (*)      (*)     All other components within normal limits    Narrative:     PLEASE REVIEW ORDER START TIME BEFORE MARKING SPECIMEN  COLLECTED.   MAGNESIUM - Abnormal; Notable for the following:     Magnesium 1.4 (*)     All other components within normal limits    Narrative:     PLEASE REVIEW ORDER START TIME BEFORE MARKING SPECIMEN  COLLECTED.   LIPASE - Abnormal; Notable for the following:     Lipase 181 (*)     All other components within normal limits   PROTIME-INR    Narrative:     PLEASE REVIEW ORDER START TIME BEFORE MARKING SPECIMEN  COLLECTED.   TROPONIN I    Narrative:     PLEASE REVIEW ORDER START TIME BEFORE MARKING SPECIMEN  COLLECTED.   B-TYPE NATRIURETIC PEPTIDE    Narrative:     PLEASE REVIEW ORDER START TIME BEFORE MARKING SPECIMEN  COLLECTED.   INFLUENZA A AND B ANTIGEN   TROPONIN I    Narrative:     PLEASE REVIEW ORDER START TIME BEFORE MARKING SPECIMEN  COLLECTED.   POCT URINE PREGNANCY     EKG Readings: (Independently Interpreted)   Rhythm: Normal Sinus Rhythm. Heart Rate: 122.   Sinus tach normal axix, L anterior vesicular block no acute ST segment changes              Medical Decision Making:   Initial Assessment:   46-year-old female presented with a chief complaint of chest pain and abdominal  pain.  Differential Diagnosis:   Initial differential diagnosis included but not limited to PNA, dehydration, influenza, and chest wall pain   Clinical Tests:   Lab Tests: Ordered and Reviewed  Radiological Study: Ordered and Reviewed  Medical Tests: Ordered and Reviewed  ED Management:  The patient was emergently evaluated in the ED, her evaluation was significant for a young female with upper abdominal tenderness.  The patient's labs were significant for elevated LFTs, elevated lipase, an elevated white blood cell count, and an elevated total bilirubin.  The patient's CT scan was significant for likely acute pancreatitis and dilated common bile duct, along with intrahepatic biliary duct dilation.  The patient's diagnosis is pancreatitis.  The patient may have a retained stone causing her pancreatitis.  She was aggressively treated in the ED with multiple IV fluid boluses, IV narcotic pain medication, IV Valium, and started on IV Zosyn.  She will require admission for further treatment of her pancreatitis.  I have discussed the case with the APC on-call for the hospitalist service.  He has accepted the patient for admission.  She will likely need GI evaluation in the morning for recommendations and treatment.            Scribe Attestation:   Scribe #1: I performed the above scribed service and the documentation accurately describes the services I performed. I attest to the accuracy of the note.    Attending Attestation:           Physician Attestation for Scribe:  Physician Attestation Statement for Scribe #1: I, Dr. Mendes , reviewed documentation, as scribed by Taylor Prescott in my presence, and it is both accurate and complete.                 ED Course     Clinical Impression:   The encounter diagnosis was Idiopathic acute pancreatitis, unspecified complication status.          El Mendes MD  01/08/17 3214

## 2017-01-09 PROBLEM — K85.90 PANCREATITIS, ACUTE: Status: ACTIVE | Noted: 2017-01-01

## 2017-01-09 PROBLEM — E80.6 HYPERBILIRUBINEMIA: Status: ACTIVE | Noted: 2017-01-01

## 2017-01-09 PROBLEM — R74.8 ELEVATED LIVER ENZYMES: Status: ACTIVE | Noted: 2017-01-01

## 2017-01-09 PROBLEM — D75.839 THROMBOCYTOSIS: Status: ACTIVE | Noted: 2017-01-01

## 2017-01-09 NOTE — CONSULTS
[facility]     CC: Elevated LFTs    HPI 46 y.o. female with elevated LFTs, acute onset, severe, associated with acute onset chest pain which began overnight but with no GI bleeding, or change in bowel habits.  No abdominal pain.  Elevation of LFTs associated with transaminitis, markedly elevated alkaline phosphatase, and elevated bilirubin.  In addition, CT scan was independently reviewed and showed biliary dilatation.  She denies alcohol use but is a long time smoker.  She states that her mother  of widely metastatic lung cancer, her maternal grandmother had breast cancer, and her father had cancer as well.  She has not had EGD or colonoscopy and denies any other pertinent medical history.     Past Medical History   Diagnosis Date    Neuropathy        History reviewed. No pertinent past surgical history.    Social History   Substance Use Topics    Smoking status: Current Every Day Smoker     Packs/day: 0.50     Years: 15.00     Types: Cigarettes    Smokeless tobacco: None    Alcohol use No       History reviewed. No pertinent family history except as noted above.    Review of Systems  General ROS: negative for - chills, fever or weight loss  Psychological ROS: negative for - hallucination, depression or suicidal ideation  Ophthalmic ROS: negative for - blurry vision, photophobia or eye pain  ENT ROS: negative for - epistaxis, sore throat or rhinorrhea  Respiratory ROS: no cough, shortness of breath, or wheezing  Cardiovascular ROS: no chest pain or dyspnea on exertion  Gastrointestinal ROS: No abdominal pain or GI bleeding  Genito-Urinary ROS: no dysuria, trouble voiding, or hematuria  Musculoskeletal ROS: negative for - arthralgia, myalgia, weakness; + back pain  Neurological ROS: no syncope or seizures; no ataxia  Dermatological ROS: negative for pruritis, rash and jaundice    Physical Examination  Visit Vitals    /81 (Patient Position: Lying)    Pulse 99    Temp 98 °F (36.7 °C)     "Resp 18    Ht 5' 2" (1.575 m)    Wt 73.5 kg (162 lb)    LMP 12/21/2016 (Approximate)    SpO2 (!) 90%    Breastfeeding No    BMI 29.63 kg/m2     General appearance: alert, cooperative, no distress  HENT: Normocephalic, atraumatic, neck symmetrical, no nasal discharge   Eyes: conjunctivae/corneas clear, PERRL, EOM's intact, sclera anicteric  Lungs: clear to auscultation bilaterally, no dullness to percussion bilaterally, symmetric expansion, breathing unlabored  Heart: regular rate and rhythm without rub; no displacement of the PMI   Abdomen: Obese, NT, + BS.  + two firm palpable subcentimeter subcutaneous nodules noted under left breast  Extremities: extremities symmetric; no clubbing, cyanosis, or edema  Integument: Skin color, texture, turgor normal; no rashes; hair distrubution normal, no jaundice  Neurologic: Alert and oriented X 3, no focal sensory or motor neurologic deficits  Psychiatric: no pressured speech; normal affect; no evidence of impaired cognition, no anxiety/depression     Labs:  Lab Results   Component Value Date    WBC 11.60 01/09/2017    HGB 12.0 01/09/2017    HCT 35.7 (L) 01/09/2017    MCV 88 01/09/2017     01/09/2017       CMP  Sodium   Date Value Ref Range Status   01/09/2017 136 136 - 145 mmol/L Final     Potassium   Date Value Ref Range Status   01/09/2017 3.4 (L) 3.5 - 5.1 mmol/L Final     Chloride   Date Value Ref Range Status   01/09/2017 102 95 - 110 mmol/L Final     CO2   Date Value Ref Range Status   01/09/2017 23 23 - 29 mmol/L Final     Glucose   Date Value Ref Range Status   01/09/2017 115 (H) 70 - 110 mg/dL Final     BUN, Bld   Date Value Ref Range Status   01/09/2017 6 6 - 20 mg/dL Final     Creatinine   Date Value Ref Range Status   01/09/2017 0.5 0.5 - 1.4 mg/dL Final     Calcium   Date Value Ref Range Status   01/09/2017 9.9 8.7 - 10.5 mg/dL Final     Total Protein   Date Value Ref Range Status   01/09/2017 5.9 (L) 6.0 - 8.4 g/dL Final     Albumin   Date Value Ref " Range Status   01/09/2017 2.3 (L) 3.5 - 5.2 g/dL Final     Total Bilirubin   Date Value Ref Range Status   01/09/2017 4.3 (H) 0.1 - 1.0 mg/dL Final     Comment:     For infants and newborns, interpretation of results should be based  on gestational age, weight and in agreement with clinical  observations.  Premature Infant recommended reference ranges:  Up to 24 hours.............<8.0 mg/dL  Up to 48 hours............<12.0 mg/dL  3-5 days..................<15.0 mg/dL  6-29 days.................<15.0 mg/dL       Alkaline Phosphatase   Date Value Ref Range Status   01/09/2017 1505 (H) 55 - 135 U/L Final     AST   Date Value Ref Range Status   01/09/2017 106 (H) 10 - 40 U/L Final     ALT   Date Value Ref Range Status   01/09/2017 354 (H) 10 - 44 U/L Final     Anion Gap   Date Value Ref Range Status   01/09/2017 11 8 - 16 mmol/L Final     eGFR if    Date Value Ref Range Status   01/09/2017 >60 >60 mL/min/1.73 m^2 Final     eGFR if non    Date Value Ref Range Status   01/09/2017 >60 >60 mL/min/1.73 m^2 Final     Comment:     Calculation used to obtain the estimated glomerular filtration  rate (eGFR) is the CKD-EPI equation. Since race is unknown   in our information system, the eGFR values for   -American and Non--American patients are given   for each creatinine result.             Imaging:  CT scan and MRCP were independently visualized and reviewed by me and showed widely metastatic disease (bone, bilateral adrenals, subcutaneous) of unknown primary as well as CBD dilatation without definite pancreatic mass.  PD was normal.    I have personally reviewed these images and also discussed with radiology.  CT chest recommended for further evaluation and subsequently showed no definite lung lesion but + lung disease.    Case was discussed with multiple providers.    Assessment:   1.  Metastatic disease of unknown primary  2.  Elevated LFTs  3.  Bone mets - likely source of  elevated alkaline phosphatase  4.  Biliary dilatation  5.  Smoking history      Plan:  1.  Recommend mammogram  2.  Recommend transfer to main campus for EUS to further evaluate pancreatic head and biliary dilatation  3.  Consider consultation with surgery versus IR for imaging guided biopsy  4.  Follow LFTs  5.  Further recommendations to follow after above.  6.  Follow up on tumor markers  7.  Communication will be sent to the referring MD, Dr. Plasencia regarding my assessment and plan on this patient via EPIC.      Quirino Waldron MD  [facility]  [address], [address]  [place] [address]  Office: [phone]  Fax: [phone]

## 2017-01-09 NOTE — CONSULTS
Consult Note    Consult Requested by:  Dr Markham  Reason for Consult:  Suspected metastatic cancer  SUBJECTIVE:     History of Present Illness:  Patient is a 46 y.o. female presents with Nausea and emesis for over 1 week associated with upper abdominal pain. Workup included CT of abdomen and pelvis which revealed findings below worrisome for metastatic cancer. Her mother passed from lung cancer. She has not felt well for a couple of weeks.l She thought she was suffering from a viral syndrome. She presented with hypercalcemia and hyponatremia associated with hyperilirubinemia    CT Abdomen Pelvis With Contrast 01/08/2017 upper abd pain          RESULTS:  Axial images are obtained through the abdomen and pelvis following administration of 75 cc Omnipaque 350 IV contrast. Images are displayed at bone and soft tissue windows L. ankle reconstruction as provided.     On bone windows there are numerous rounded lesions of the thoracic and lumbar spine consistent with extensive metastatic disease. In all fracture right rib is noted posteriorly. In this patient with right chest pain CT of the chest rule out pathologic rib fracture is suggested. There is basilar atelectasis probably due to hypoventilation thorax or pleural effusion is seen.     The liver is of normal size and general CT density without a mass. There is dilation of the common bile duct to the pancreas and a gallbladder is not seen. The pancreas itself is bulkier than expected on this pelvic study. A focal mass is not identified at this time.The patient has a MRCP already scheduled. There are 2 rounded accessory splenules near the splenic hilum and a third well-rounded 14 mm mass between the stomach and spleen This could represent a metastasis or a third small accessory spleen. The spleen itself appears of normal contour and CT density.     Both adrenal glands are significantly enlarged the right measuring 4.4 cm and the left measuring 3.5 cm consistent with  metastasis. The abdominal aorta and inferior vena cava are of normal caliber. Adenopathy or soft tissue masses are not seen.     The gastrointestinal organs appear normal without dilatation air-fluid levels or soft tissue masses. A normal appendix is seen.     Within the pelvis the bladder is of normal contour without mass or asymmetry. The uterus is not enlarged. No free fluid or free air is seen. Metastasis within the sacrum and the right ilium posteriorly are noted  IMPRESSION:   Extensive rounded osteolytic bone metastases are identified in the sacrum lumbar and thoracic spines. Bilateral adrenal masses are consistent with metastasis as well. CT of the chest is recommended to search for a primary neoplasm and possible pathologic rib fracture. Dilation of the common bile duct and bulkiness of the pancreas without a focal mass defined yet. A 14 mm mass between the stomach and spleen may represent a third accessory spleen or a metastasis. A gallbladder is not identified.        Electronically signed by: Sunday Schwartz MD  Date:     01/09/17  Time:    09:30      MRI MRCP Without Contrast 01/09/2017 pancreatitis, transaminitis, hyperbilirubinemia          RESULTS:  Axial and coronal images are obtained through the upper abdomen with multiple MR sequences. Additional multi-planar images and MRCP rotated imaging is provided as well for evaluation.     The liver is of normal size and general MR signal intensity without a focal mass. There is mild intrahepatic biliary dilatation and significant dilation of the common bile duct reaching 1.75 cm. There is a rather abrupt taper of the common duct and then severe stenosis of the common duct within the head of the pancreas. The pancreatic duct is not significantly dilated. The pancreas is mildly blurred with fairly uniform MR signal however without a distinct focal mass demonstrated producing the common duct stenosis. There is inflammation and edema around the head and  proximal body of the pancreas suggesting pancreatitis. Hemorrhage is not demonstrated.     Heterogeneous soft tissue density is identified in both adrenal glands measuring 3.7 cm on the right hand 3.8 cm on the left. Renal masses cysts or hydronephrosis is not seen. The spleen appears of normal size and MR signal intensity. There is an accessory spleen seen adjacent to the tail of the pancreas. Between the spleen and the stomach however it is a less well defined soft tissue density nodule in measured 11 mm not matching the spleen in signal intensity.  IMPRESSION:   Severe stenosis of the common bile duct in the head of the pancreas without a distinct mass seen. The proximal common duct measures 1.75 cm. Mild acute pancreatitis. Bilateral soft tissue masses in the adrenal glands are noted. A small 11 mm mass is seen between the spleen and the stomach in the left upper quadrant of uncertain etiology.        Electronically signed by: Sunday Schwartz MD  Date:     01/09/17  Time:    13:26        Signed By: Sunday Schwartz Jr., MD on 1/9/2017 1:26 PM           Pt is not having pain at the moment  GI has been consulted as well for pancreatitis and hyperbilirubinemia associated with a dilated CBD    Review of patient's allergies indicates:  No Known Allergies    Past Medical History   Diagnosis Date    Neuropathy      History reviewed. No pertinent past surgical history.  History reviewed. No pertinent family history.  Social History   Substance Use Topics    Smoking status: Current Every Day Smoker     Packs/day: 0.50     Years: 15.00     Types: Cigarettes    Smokeless tobacco: None    Alcohol use No       Review of Systems:  CONSTITUTIONAL: No fevers, chills, night sweats, wt. loss,  + decreased appetite changes  SKIN: no rashes or itching  ENT: No headaches, head trauma, vision changes, or eye pain  LYMPH NODES: None noticed   CV: No chest pain, palpitations.   RESP: No dyspnea on exertion, cough, wheezing, or  hemoptysis  GI: +  nausea,  + emesis, diarrhea, constipation, melena, hematochezia, int  pain.   : No dysuria, hematuria, urgency, or frequency   HEME: No easy bruising, bleeding problems  PSYCHIATRIC: No depression, anxiety, psychosis, hallucinations.  NEURO: No seizures, memory loss, dizziness or difficulty sleeping  MSK: No arthralgias or joint swelling    OBJECTIVE:     Vital Signs:  Temp:  [98 °F (36.7 °C)-98.6 °F (37 °C)]   Pulse:  []   Resp:  [18-20]   BP: (128-139)/(81-87)   SpO2:  [90 %-95 %]     Physical Exam:  Gen: NAD, A and O x3,   Psych: pleasant affect, normal thought process  Eyes: Pupils round and non dilated, EOM intact  Nose: Nares patent  OP clear, mucosa patent  Neck: suppple, no JVD, trachea midline, no palpable mass, no adenopathy  Lungs: CTAB, no wheezes, no use of accessory muscles  CV: S1S2 with RRR, No mrg  Abd: soft, NTND, + BS  Extr: No CCE, ALEXA, strength normal, good capillary refill  Neuro: steady gait, CNs grossly intact  Rheum: No joint swelling    Laboratory:    Lab Results   Component Value Date    WBC 11.60 01/09/2017    HGB 12.0 01/09/2017    HCT 35.7 (L) 01/09/2017    MCV 88 01/09/2017     01/09/2017     CMP  Sodium   Date Value Ref Range Status   01/09/2017 136 136 - 145 mmol/L Final     Potassium   Date Value Ref Range Status   01/09/2017 3.4 (L) 3.5 - 5.1 mmol/L Final     Chloride   Date Value Ref Range Status   01/09/2017 102 95 - 110 mmol/L Final     CO2   Date Value Ref Range Status   01/09/2017 23 23 - 29 mmol/L Final     Glucose   Date Value Ref Range Status   01/09/2017 115 (H) 70 - 110 mg/dL Final     BUN, Bld   Date Value Ref Range Status   01/09/2017 6 6 - 20 mg/dL Final     Creatinine   Date Value Ref Range Status   01/09/2017 0.5 0.5 - 1.4 mg/dL Final     Calcium   Date Value Ref Range Status   01/09/2017 9.9 8.7 - 10.5 mg/dL Final     Total Protein   Date Value Ref Range Status   01/09/2017 5.9 (L) 6.0 - 8.4 g/dL Final     Albumin   Date Value Ref  Range Status   01/09/2017 2.3 (L) 3.5 - 5.2 g/dL Final     Total Bilirubin   Date Value Ref Range Status   01/09/2017 4.3 (H) 0.1 - 1.0 mg/dL Final     Comment:     For infants and newborns, interpretation of results should be based  on gestational age, weight and in agreement with clinical  observations.  Premature Infant recommended reference ranges:  Up to 24 hours.............<8.0 mg/dL  Up to 48 hours............<12.0 mg/dL  3-5 days..................<15.0 mg/dL  6-29 days.................<15.0 mg/dL       Alkaline Phosphatase   Date Value Ref Range Status   01/09/2017 1505 (H) 55 - 135 U/L Final     AST   Date Value Ref Range Status   01/09/2017 106 (H) 10 - 40 U/L Final     ALT   Date Value Ref Range Status   01/09/2017 354 (H) 10 - 44 U/L Final     Anion Gap   Date Value Ref Range Status   01/09/2017 11 8 - 16 mmol/L Final     eGFR if    Date Value Ref Range Status   01/09/2017 >60 >60 mL/min/1.73 m^2 Final     eGFR if non    Date Value Ref Range Status   01/09/2017 >60 >60 mL/min/1.73 m^2 Final     Comment:     Calculation used to obtain the estimated glomerular filtration  rate (eGFR) is the CKD-EPI equation. Since race is unknown   in our information system, the eGFR values for   -American and Non--American patients are given   for each creatinine result.           Diagnostic Results:  See above    ASSESSMENT/PLAN:     1. Osteolytic bone lesions with possible pathologic fractures  2. Bilateral adrenal masses  3. Hypercalcemia at presentation  4. Nausea   5. Emesis  6. Hyperbilirubinemia with CBD dilatation  7. Pancreatitis    Await Ct chest  Differential includes myeloma, metastatic melanoma, breast cancer, Lymphoma, GI malignancy  Atypical infection with TB or histo also in differential   Check labs in am   F/U on CT chest     GI consult  Send for a tissue biopsy for diagnosis  Will discuss with radiology in am   Adrenal masses likely accessible easily for  biopsy    CHeck intact pth and vitamin D levels as well    Will be happy to follow along  Thank you    Addendum   CT chest reviewed  Will send for biopsy in am   Will discuss with radiology

## 2017-01-09 NOTE — ED NOTES
"Assumed care from:  KP:  Patient awake, alert and oriented x 3, skin warm and dry, crying with back pain; "spasms thet come and go" with family at bedside.  Patient here with back and chest pain that she has had off and on for few days; states it feels like muscle spasms, sharp pain that comes and goes.  "

## 2017-01-09 NOTE — PLAN OF CARE
Problem: Patient Care Overview  Goal: Plan of Care Review  Outcome: Ongoing (interventions implemented as appropriate)  Pt aaox4. Pain controlled with IV pain medicine PRN. Patient up to restroom with standby assist. Iv fluids administered per orders. Patient free from falls will continue to monitor.

## 2017-01-09 NOTE — PLAN OF CARE
Problem: Patient Care Overview  Goal: Plan of Care Review  Outcome: Ongoing (interventions implemented as appropriate)  A&Ox3. Continent of b&b, ambulates to bathroom. Cont on IV fluids. IV abt therapy in progress, remains afebrile. Good pain control with IV meds. Cardiac monitoring. Remains free from falls. Bed in low position, call light within reach.

## 2017-01-09 NOTE — SUBJECTIVE & OBJECTIVE
Past Medical History   Diagnosis Date    Neuropathy        History reviewed. No pertinent past surgical history.    Review of patient's allergies indicates:  No Known Allergies    No current facility-administered medications on file prior to encounter.      Current Outpatient Prescriptions on File Prior to Encounter   Medication Sig    naproxen sodium (ANAPROX) 550 MG tablet Take 550 mg by mouth every 12 (twelve) hours.     Family History     None        Social History Main Topics    Smoking status: Current Every Day Smoker     Packs/day: 0.50     Years: 15.00     Types: Cigarettes    Smokeless tobacco: Not on file    Alcohol use No    Drug use: Yes    Sexual activity: Yes     Review of Systems   Constitutional: Positive for chills, fever and unexpected weight change.   HENT: Negative for congestion and sore throat.    Eyes: Negative for photophobia and visual disturbance.   Respiratory: Negative for cough and shortness of breath.    Cardiovascular: Negative for palpitations and leg swelling.   Gastrointestinal: Positive for abdominal pain and nausea. Negative for abdominal distention, blood in stool and constipation.   Genitourinary: Negative for dyspareunia and dysuria.   Skin: Negative for rash and wound.   Neurological: Negative for dizziness and weakness.   Psychiatric/Behavioral: Negative for dysphoric mood. The patient is not nervous/anxious.      Objective:     Vital Signs (Most Recent):  Temp: 98.1 °F (36.7 °C) (01/09/17 0339)  Pulse: 109 (01/09/17 0339)  Resp: 19 (01/09/17 0339)  BP: 139/87 (01/09/17 0339)  SpO2: 95 % (01/09/17 0339) Vital Signs (24h Range):  Temp:  [97.7 °F (36.5 °C)-98.3 °F (36.8 °C)] 98.1 °F (36.7 °C)  Pulse:  [109-134] 109  Resp:  [18-19] 19  BP: (133-144)/() 139/87     Weight: 73.5 kg (162 lb)  Body mass index is 29.63 kg/(m^2).    Physical Exam   Constitutional: She is oriented to person, place, and time. She appears well-developed and well-nourished. No distress.    HENT:   Head: Normocephalic and atraumatic.   Eyes: Right eye exhibits no discharge. Left eye exhibits no discharge. Scleral icterus is present.   Neck: Neck supple. No JVD present.   Cardiovascular: Normal rate and regular rhythm.    Pulmonary/Chest: Effort normal and breath sounds normal. No respiratory distress.   Abdominal: Soft. Bowel sounds are normal. She exhibits no distension. There is tenderness.   Musculoskeletal: She exhibits no edema or tenderness.   Neurological: She is alert and oriented to person, place, and time.   Skin: Skin is warm and dry. She is not diaphoretic.   Psychiatric: She has a normal mood and affect. Her behavior is normal.   Nursing note and vitals reviewed.      Significant Labs:   CBC:   Recent Labs  Lab 01/08/17  1838   WBC 13.80*   HGB 14.0   HCT 41.9   *     CMP:   Recent Labs  Lab 01/08/17  1838   *   K 3.9   CL 97   CO2 25   *   BUN 11   CREATININE 0.6   CALCIUM 11.6*   PROT 7.6   ALBUMIN 2.8*   BILITOT 4.2*   ALKPHOS 1927*   *   *   ANIONGAP 13   EGFRNONAA >60     Lab Results   Component Value Date    LIPASE 181 (H) 01/08/2017       Significant Imaging:     CT abdomen and pelvis (preliminary interpretation):  Acute pancreatitis. Dilated CBD.

## 2017-01-09 NOTE — PLAN OF CARE
Pt verified her PCP as Dr. Heidy Kearney at [facility] in [place].  Updated [place] in admissions who made the change.  Pt's discharge plan is home with no anticipated needs.       01/09/17 1000   Discharge Assessment   Assessment Type Discharge Planning Assessment   Confirmed/corrected address and phone number on facesheet? Yes   Assessment information obtained from? Patient   Communicated expected length of stay with patient/caregiver no   Type of Healthcare Directive Received (Denies.  Pt is  from her  and has a 21 y/o daighter.)   If Healthcare Directive is received, is it scanned into Epic? no (comment)   Prior to hospitilization cognitive status: Alert/Oriented   Prior to hospitalization functional status: Independent   Current cognitive status: Alert/Oriented   Current Functional Status: Independent   Arrived From home or self-care   Lives With child(konrad), adult;significant other  (Sig malcolm Palma and pt's 21 y/o dtr.)   Able to Return to Prior Arrangements yes   Is patient able to care for self after discharge? Yes   How many people do you have in your home that can help with your care after discharge? 2   Who are your caregiver(s) and their phone number(s)? (sig malcolm Gill)   Patient's perception of discharge disposition home or selfcare   Readmission Within The Last 30 Days no previous admission in last 30 days   Patient currently being followed by outpatient case management? No   Patient currently receives home health services? No   Does the patient currently use HME? No   Equipment Currently Used at Home none   Do you have any problems affording any of your prescribed medications? No  (Pharmacy is [facility] on [facility]. )   Is the patient taking medications as prescribed? yes   Do you have any financial concerns preventing you from receiving the healthcare you need? No   Does the patient have transportation to healthcare appointments? Yes   Transportation  Available car   On Dialysis? No   Does the patient receive services at the Coumadin Clinic? No   Discharge Plan A Home with family   Patient/Family In Agreement With Plan yes

## 2017-01-10 PROBLEM — R73.03 PREDIABETES: Status: ACTIVE | Noted: 2017-01-01

## 2017-01-10 PROBLEM — F17.210 CIGARETTE SMOKER: Status: ACTIVE | Noted: 2017-01-01

## 2017-01-10 PROBLEM — J44.9 COPD (CHRONIC OBSTRUCTIVE PULMONARY DISEASE): Status: ACTIVE | Noted: 2017-01-01

## 2017-01-10 PROBLEM — E83.42 HYPOMAGNESEMIA: Status: ACTIVE | Noted: 2017-01-01

## 2017-01-10 PROBLEM — R74.8 ELEVATED SERUM ALKALINE PHOSPHATASE LEVEL: Status: ACTIVE | Noted: 2017-01-01

## 2017-01-10 PROBLEM — C79.9 CANCER, METASTATIC: Status: ACTIVE | Noted: 2017-01-01

## 2017-01-10 NOTE — PLAN OF CARE
Problem: Patient Care Overview  Goal: Plan of Care Review  Nc 2 lpm in use with sats 96% and aerosol txs Q6 hrs tolerates well.

## 2017-01-10 NOTE — PROGRESS NOTES
Progress Note  Hematology/Oncology      Admit Date: 1/8/2017   LOS: 2 days     SUBJECTIVE:     Follow-up For:  Multiple bone lesions and findings worrisome for metastatic carcinoma on imaging studies  Pt tolerating antibiotics, no further emesis    Case discussed with Dr Yanez    Review of Systems:  CONSTITUTIONAL: No fevers, chills, night sweats, wt. loss, appetite changes  SKIN: no rashes or itching  ENT: No headaches, head trauma, vision changes, or eye pain  LYMPH NODES: None noticed   CV: No chest pain, palpitations.   RESP: No dyspnea on exertion, cough, wheezing, or hemoptysis  GI: No nausea, emesis, diarrhea, constipation, melena, hematochezia, pain.   : No dysuria, hematuria, urgency, or frequency   HEME: No easy bruising, bleeding problems  PSYCHIATRIC: No depression, anxiety, psychosis, hallucinations.  NEURO: No seizures, memory loss, dizziness or difficulty sleeping  MSK: + back pain , no joint swelling    OBJECTIVE:     Vital Signs (Most Recent)  Temp: 98.3 °F (36.8 °C) (01/10/17 0356)  Pulse: (!) 112 (01/10/17 0737)  Resp: 16 (01/10/17 0737)  BP: 127/79 (01/10/17 0356)  SpO2: 96 % (01/10/17 0737)    Pain Assessment: 5/10 back    Vital Signs Range (Last 24H):  Temp:  [98 °F (36.7 °C)-98.8 °F (37.1 °C)]   Pulse:  []   Resp:  [16-22]   BP: (127-131)/(79-89)   SpO2:  [90 %-97 %]     I & O (Last 24H):  Intake/Output Summary (Last 24 hours) at 01/10/17 0824  Last data filed at 01/10/17 0613   Gross per 24 hour   Intake          4287.08 ml   Output                0 ml   Net          4287.08 ml     Physical Exam:    Gen: NAD, A and O x3,   Psych: pleasant affect, normal thought process  Eyes: Pupils round and non dilated, EOM intact  Nose: Nares patent  OP clear, mucosa patent  Neck: suppple, no JVD, trachea midline  Chest with palpable hard subcutaneous lesions mobile under left breast, non tender  Abd: soft, NTND, + BS  Extr: No CCE, ALEXA, strength normal, good capillary refill  Neuro: steady  gait, CNs grossly intact  Rheum: No joint swelling    Laboratory:  CBC with Differential:  Lab Results   Component Value Date    WBC 11.60 01/09/2017    HGB 12.0 01/09/2017    HCT 35.7 (L) 01/09/2017    MCV 88 01/09/2017     01/09/2017     CMP  Sodium   Date Value Ref Range Status   01/09/2017 136 136 - 145 mmol/L Final     Potassium   Date Value Ref Range Status   01/09/2017 3.4 (L) 3.5 - 5.1 mmol/L Final     Chloride   Date Value Ref Range Status   01/09/2017 102 95 - 110 mmol/L Final     CO2   Date Value Ref Range Status   01/09/2017 23 23 - 29 mmol/L Final     Glucose   Date Value Ref Range Status   01/09/2017 115 (H) 70 - 110 mg/dL Final     BUN, Bld   Date Value Ref Range Status   01/09/2017 6 6 - 20 mg/dL Final     Creatinine   Date Value Ref Range Status   01/09/2017 0.5 0.5 - 1.4 mg/dL Final     Calcium   Date Value Ref Range Status   01/09/2017 9.9 8.7 - 10.5 mg/dL Final     Total Protein   Date Value Ref Range Status   01/09/2017 5.9 (L) 6.0 - 8.4 g/dL Final     Albumin   Date Value Ref Range Status   01/09/2017 2.3 (L) 3.5 - 5.2 g/dL Final     Total Bilirubin   Date Value Ref Range Status   01/09/2017 4.3 (H) 0.1 - 1.0 mg/dL Final     Comment:     For infants and newborns, interpretation of results should be based  on gestational age, weight and in agreement with clinical  observations.  Premature Infant recommended reference ranges:  Up to 24 hours.............<8.0 mg/dL  Up to 48 hours............<12.0 mg/dL  3-5 days..................<15.0 mg/dL  6-29 days.................<15.0 mg/dL       Alkaline Phosphatase   Date Value Ref Range Status   01/09/2017 1505 (H) 55 - 135 U/L Final     AST   Date Value Ref Range Status   01/09/2017 106 (H) 10 - 40 U/L Final     ALT   Date Value Ref Range Status   01/09/2017 354 (H) 10 - 44 U/L Final     Anion Gap   Date Value Ref Range Status   01/09/2017 11 8 - 16 mmol/L Final     eGFR if    Date Value Ref Range Status   01/09/2017 >60 >60  mL/min/1.73 m^2 Final     eGFR if non    Date Value Ref Range Status   01/09/2017 >60 >60 mL/min/1.73 m^2 Final     Comment:     Calculation used to obtain the estimated glomerular filtration  rate (eGFR) is the CKD-EPI equation. Since race is unknown   in our information system, the eGFR values for   -American and Non--American patients are given   for each creatinine result.              CEA 0.0 - 5.0 ng/mL 5455.7 (H)        Ref Range & Units 1d ago     CA 19-9 2.0 - 40.0 U/mL >71114.0 (H)         intact calcium normal  SPEP etc pending      Diagnostic Results:  Reviewed CT chest abd pelvis and MRCP    ASSESSMENT/PLAN:     Active Hospital Problems    Diagnosis  POA    *Pancreatitis, acute [K85.90]  Yes    Hyperbilirubinemia [E80.6]  Yes    Elevated liver enzymes [R74.8]  Yes    Thrombocytosis [D47.3]  Yes      Resolved Hospital Problems    Diagnosis Date Resolved POA   No resolved problems to display.         Differential Dx is extensive  Proceed with biopsy right adrenal mass as well as biopsy one of the firm hard nodules under her left breast in the 6 o'clock position  Once tissue is obtained if pt remains stable agree with Dr Yanez to transfer to [facility] for EUS with biopsy to rule out possible cholangiocarcinoma and stent placement for CBD dilatation

## 2017-01-10 NOTE — TELEPHONE ENCOUNTER
----- Message from Mary Panchal sent at 1/10/2017  9:25 AM CST -----  Contact: Jef with [facility]  Jef with [facility] calling regarding Dr Lopez would like to speak to Dr Arriola regarding patient. Please call Jef at [phone]. Thanks! Call placed to pod.

## 2017-01-10 NOTE — PHYSICIAN QUERY
"PT Name: Kat Corley  MR #: [id number]  Physician Query Form - Nutrition Clarification   Reviewer  Ext [phone] Diego Dugan RN CDS    This form is a permanent document in the medical record.     Query Date: January 10, 2017  By submitting this query, we are merely seeking further clarification of documentation.. Please utilize your independent clinical judgment when addressing the question(s) below.    The Medical record reflects the following:   Indicators     Supporting Clinical Findings Location in Medical Record   X Wt/ BMI/ Usual Body Weight Wt. 162 lbs  BMI 29.7 Flowsheet 1/8   X Alb          TProt            Pre-Albumin Alb 2.8-2.0  T Prot 5.9 Lab 1/8-1/10   X Acute or Chronic illness Acute Pancreatitis, Secondary malignant neoplasm of bone, Hypocalcemia, Hypomagnesemia, COPD H/P 1/9    % of estimated energy intake over a time frame from p.o., TF or TPN     X Weight status over a time frame , wt. loss,  + decreased appetite changes       She reports 30 pounds weight loss in the past 4 months.  She is unsure why she has lost the weight.     unexpected weight change.  Weight: 73.5 kg (162 lb) Hem/Onc consult 1/9        H/P 1/9            H/P 1/9    Subcutaneous fat and/or muscle loss      Reduced  strength      "Delayed wound healing:, "Failure to thrive"      "Fluid accumulation" or "Edema"      "Hypoalbuminemia"      Other:        AND / ASPEN Clinical Characteristics (October 2011)  A minimum of two characteristics is recommended for diagnosing either moderate or severe malnutrition    Mild Malnutrition Moderate Malnutrition Severe Malnutrition    Energy Intake from p.o., TF or TPN. < 75% intake of estimated energy needs for less than 7 days. < 75% intake of estimated energy needs for greater than 7 days. < 50% intake of estimated energy needs for > 5 days   Weight Loss 1-2% in 1 month  5% in 3 months  7.5% in 6 months  10% in one year 1-2 % in 1 week  5% in 1 month  7.5% in 3 months  10% in 6 " months  20% in 1 year > 2% in 1 week  > 5% in 1 month  >7.5% in 3 months  >10% in 6 months  >20% in 1 year   Physical Findings None *Mild subcutaneous fat and/or muscle loss  *Mild fluid accumulation  *Stage II decubitus  *Surgical wound or non-healing wound *Mod subcutaneous fat and/or muscle loss  *Mod/severe fluid accumulation  *Stage III or IV decubitus  *Non-healing surgical wound     Provider, please specify the diagnosis or diagnoses associated with above clinical findings.                                [ ] Mild Protein-Calorie Malnutrition  [ ] Moderate Protein-Calorie Malnutrition  [ ] Severe Protein-CalorieMalnutrition  [ ] Cachexia  [ ] Anorexia  [ ] Other Nutritional Diagnosis (Specify) ____________________________________  [x ] Clinically Undetermined    Please document in your progress notes daily for the duration of treatment, until resolved, and include in your discharge summary.

## 2017-01-10 NOTE — PROGRESS NOTES
[facility] Medicine  Progress Note    Patient Name: Kat Corley  MRN: [id number]  Patient Class: IP- Inpatient   Admission Date: 1/8/2017  Length of Stay: 2 days  Expected Discharge Date:   Attending Physician: Shahrzad Plasencia MD  Primary Care Provider: NAMITA Guerin        Subjective:     Principal Problem:Cancer, metastatic    HPI:  Mrs. Corley presents for evaluation of chest pain. She describes the pain as a burning sensation to her LEFT chest which is non-radiating and intermittent. She denies shortness of breath. She denies known exacerbating or alleviating factors. She reports nausea and aversion to food this week. She reports epigastric discomfort. She reports fever and chills. She denies trauma. She denies alcohol consumption. She denies Acetaminophen use. She denies yellowing of her skin. She denies recent travel or ingestion of raw or undercooked food. She denies diarrhea. She reports 30 pounds weight loss in the past 4 months. She is unsure why she has lost the weight.    She was evaluated in the ED. She was found to have elevated lipase, liver enzymes and bilirubin. A CT of the abdomen revealed pancreatitis and dilated CBD. She has history of cholecystectomy when she was 21.      Hospital Course:  Pt admitted for pancreatitis. Received IVFs, anti emetics, narcotics. Kept NPO. CT abd/ pelv significant for extensive metastatic lesions in vertebral spine and abdomen. GI and oncology consulted. CT brain, CT chest does not reveal any primary source of malignancy. CEA and  significantly elevated.     Interval History: pt with uneventful night. Pain is well controlled at this time.     Review of Systems   Constitutional: Negative for chills and fever.   Respiratory: Negative for shortness of breath.    Cardiovascular: Negative for chest pain, palpitations and leg swelling.   Gastrointestinal: Negative for abdominal distention, abdominal pain, diarrhea and nausea.    Genitourinary: Negative for dysuria.   Neurological: Negative for dizziness and light-headedness.     Objective:     Vital Signs (Most Recent):  Temp: 98.3 °F (36.8 °C) (01/10/17 0356)  Pulse: (!) 112 (01/10/17 0737)  Resp: 16 (01/10/17 0737)  BP: 127/79 (01/10/17 0356)  SpO2: 96 % (01/10/17 0737) Vital Signs (24h Range):  Temp:  [98 °F (36.7 °C)-98.8 °F (37.1 °C)] 98.3 °F (36.8 °C)  Pulse:  [] 112  Resp:  [16-22] 16  BP: (127-131)/(79-89) 127/79     Weight: 73.5 kg (162 lb)  Body mass index is 29.63 kg/(m^2).    Intake/Output Summary (Last 24 hours) at 01/10/17 0936  Last data filed at 01/10/17 0613   Gross per 24 hour   Intake          4287.08 ml   Output                0 ml   Net          4287.08 ml      Physical Exam   Constitutional: She appears well-developed and well-nourished. No distress.   HENT:   Head: Normocephalic and atraumatic.   Right Ear: External ear normal.   Left Ear: External ear normal.   Nose: Nose normal.   Mouth/Throat: Oropharynx is clear and moist. No oropharyngeal exudate.   Eyes: Conjunctivae are normal.   Neck: Neck supple. No JVD present.   Cardiovascular: Normal rate, regular rhythm and normal heart sounds.    Pulmonary/Chest: Effort normal and breath sounds normal. No respiratory distress. She has no wheezes.   Abdominal: Soft. Bowel sounds are normal. She exhibits no distension. There is no tenderness.   Musculoskeletal: She exhibits no edema.   Neurological: She is alert. No cranial nerve deficit.   Psychiatric: She has a normal mood and affect. Her behavior is normal.       Significant Labs: All pertinent labs within the past 24 hours have been reviewed.    Significant Imaging: I have reviewed all pertinent imaging results/findings within the past 24 hours.    Assessment/Plan:      * Cancer, metastatic  Oncology to take to OR for biopsies of breast and adrenal this morning. Then anticipate transfer to [facility] for EUS to further evaluate pancreatic head and biliary  dilatation.  CEA and  severely elevated. CT head, CT chest, CT abd/ pelv still does not clearly identify primary source of malignancy.   Pain control.       Prediabetes  A1C 6.4. Monitor with dietary changes.       Hyperbilirubinemia  Stable. Trending daily.       COPD (chronic obstructive pulmonary disease)  Duonebs scheduled Q6 hr. Now will change to prn as she is no longer wheezing.       Pancreatitis, acute  IVFs. Pain control. Likely 2/2 CBD stenosis.      Elevated liver enzymes  Stable. Trending daily.      Thrombocytosis  Stable. Monitor.       Elevated serum alkaline phosphatase level  Stable. Likely 2/2 metastatic bone lesions.       Hypomagnesemia  Monitor. Replete prn.       Cigarette smoker  Makes her high risk for malignancy. Encouraged cessation indefinitely.       VTE Risk Mitigation         Ordered     enoxaparin injection 40 mg  Daily     Route:  Subcutaneous        01/08/17 2200     Medium Risk of VTE  Once      01/08/17 2200          Shahrzad Plasencia MD  Department of Hospital Medicine   [facility]

## 2017-01-10 NOTE — ASSESSMENT & PLAN NOTE
PRN analgesics  NPO  IVF  Serial abdominal exams  Trend lipase, WBC, lactate, calcium  MRCP to rule out CBD stone  Consult GI

## 2017-01-10 NOTE — PROGRESS NOTES
Elevated HR noted in the 120s-130s. Pt denied any pain including CP. No sob noted. Pt states she has been ambulating in room. Notified , no new orders noted at this time.

## 2017-01-10 NOTE — PROGRESS NOTES
Anterior abdominal wall mass bx completed pt tolerated well no distress noted. AAO x3 with no c/o pain at this time and vitals stable view graphics. Pt transferred back to Bed 402 A and care assumed. Specimen collected and walked to lab for processing. No additional needs noted at this time.

## 2017-01-10 NOTE — PLAN OF CARE
Problem: Patient Care Overview  Goal: Plan of Care Review  Outcome: Ongoing (interventions implemented as appropriate)  Plan of care reviewed with patient, verbalized understanding. Pain managed with prn morphine and dilaudid. Denies nausea during shift. Telemetry 8632, NSR. US guided biopsy during shift, awaiting results. Urine sample collected during shift, awaiting results. Tolerating clear liquid diet at this time. IVF and abx as ordered. Free of fall/injury during shift. Denies further needs. Will monitor. Call light within reach.

## 2017-01-10 NOTE — SUBJECTIVE & OBJECTIVE
Interval History: pt with uneventful night. Pain is well controlled at this time.     Review of Systems   Constitutional: Negative for chills and fever.   Respiratory: Negative for shortness of breath.    Cardiovascular: Negative for chest pain, palpitations and leg swelling.   Gastrointestinal: Negative for abdominal distention, abdominal pain, diarrhea and nausea.   Genitourinary: Negative for dysuria.   Neurological: Negative for dizziness and light-headedness.     Objective:     Vital Signs (Most Recent):  Temp: 98.3 °F (36.8 °C) (01/10/17 0356)  Pulse: (!) 112 (01/10/17 0737)  Resp: 16 (01/10/17 0737)  BP: 127/79 (01/10/17 0356)  SpO2: 96 % (01/10/17 0737) Vital Signs (24h Range):  Temp:  [98 °F (36.7 °C)-98.8 °F (37.1 °C)] 98.3 °F (36.8 °C)  Pulse:  [] 112  Resp:  [16-22] 16  BP: (127-131)/(79-89) 127/79     Weight: 73.5 kg (162 lb)  Body mass index is 29.63 kg/(m^2).    Intake/Output Summary (Last 24 hours) at 01/10/17 0936  Last data filed at 01/10/17 0613   Gross per 24 hour   Intake          4287.08 ml   Output                0 ml   Net          4287.08 ml      Physical Exam   Constitutional: She appears well-developed and well-nourished. No distress.   HENT:   Head: Normocephalic and atraumatic.   Right Ear: External ear normal.   Left Ear: External ear normal.   Nose: Nose normal.   Mouth/Throat: Oropharynx is clear and moist. No oropharyngeal exudate.   Eyes: Conjunctivae are normal.   Neck: Neck supple. No JVD present.   Cardiovascular: Normal rate, regular rhythm and normal heart sounds.    Pulmonary/Chest: Effort normal and breath sounds normal. No respiratory distress. She has no wheezes.   Abdominal: Soft. Bowel sounds are normal. She exhibits no distension. There is no tenderness.   Musculoskeletal: She exhibits no edema.   Neurological: She is alert. No cranial nerve deficit.   Psychiatric: She has a normal mood and affect. Her behavior is normal.       Significant Labs: All pertinent labs  within the past 24 hours have been reviewed.    Significant Imaging: I have reviewed all pertinent imaging results/findings within the past 24 hours.

## 2017-01-10 NOTE — ASSESSMENT & PLAN NOTE
Oncology to take to OR for biopsies of breast and adrenal this morning. Then anticipate transfer to main campus for EUS to further evaluate pancreatic head and biliary dilatation.  CEA and  severely elevated. CT head, CT chest, CT abd/ pelv still does not clearly identify primary source of malignancy.   Pain control.

## 2017-01-10 NOTE — PLAN OF CARE
Problem: Patient Care Overview  Goal: Plan of Care Review  Outcome: Ongoing (interventions implemented as appropriate)  Pt resting quietly in bed, NAD noted, RR even and unlabored, breath sounds coarse and diminished, O2 2L NC in use, IVF infusing to rt forearm w/o difficulty, able to voice needs, pain managed w/PRN medication at this time, telemetry monitoring in place #2037- ST, pt remains free from injury or falls, left resting in low bed, wheels locked , side rails up x 2, call bell near, will monitor.

## 2017-01-10 NOTE — H&P
[facility] Medicine  History & Physical    Patient Name: Kat Corley  MRN: [id number]  Admission Date: 1/8/2017  Attending Physician: Shahrzad Plasencia MD   Primary Care Provider: NAMITA Guerin         Patient information was obtained from patient, past medical records and ER records.     Subjective:     Principal Problem:Pancreatitis, acute    Chief Complaint:  Chest pain     HPI: Mrs. Corley presents for evaluation of chest pain. She describes the pain as a burning sensation to her LEFT chest which is non-radiating and intermittent. She denies shortness of breath. She denies known exacerbating or alleviating factors. She reports nausea and aversion to food this week. She reports epigastric discomfort. She reports fever and chills. She denies trauma. She denies alcohol consumption. She denies Acetaminophen use. She denies yellowing of her skin. She denies recent travel or ingestion of raw or undercooked food. She denies diarrhea. She reports 30 pounds weight loss in the past 4 months. She is unsure why she has lost the weight.    She was evaluated in the ED. She was found to have elevated lipase, liver enzymes and bilirubin. A CT of the abdomen revealed pancreatitis and dilated CBD. She has history of cholecystectomy when she was 21.      Past Medical History   Diagnosis Date    Neuropathy        History reviewed. No pertinent past surgical history.    Review of patient's allergies indicates:  No Known Allergies    No current facility-administered medications on file prior to encounter.      Current Outpatient Prescriptions on File Prior to Encounter   Medication Sig    naproxen sodium (ANAPROX) 550 MG tablet Take 550 mg by mouth every 12 (twelve) hours.     Family History     None        Social History Main Topics    Smoking status: Current Every Day Smoker     Packs/day: 0.50     Years: 15.00     Types: Cigarettes    Smokeless tobacco: Not on file    Alcohol use No     Drug use: Yes    Sexual activity: Yes     Review of Systems   Constitutional: Positive for chills, fever and unexpected weight change.   HENT: Negative for congestion and sore throat.    Eyes: Negative for photophobia and visual disturbance.   Respiratory: Negative for cough and shortness of breath.    Cardiovascular: Negative for palpitations and leg swelling.   Gastrointestinal: Positive for abdominal pain and nausea. Negative for abdominal distention, blood in stool and constipation.   Genitourinary: Negative for dyspareunia and dysuria.   Skin: Negative for rash and wound.   Neurological: Negative for dizziness and weakness.   Psychiatric/Behavioral: Negative for dysphoric mood. The patient is not nervous/anxious.      Objective:     Vital Signs (Most Recent):  Temp: 98.1 °F (36.7 °C) (01/09/17 0339)  Pulse: 109 (01/09/17 0339)  Resp: 19 (01/09/17 0339)  BP: 139/87 (01/09/17 0339)  SpO2: 95 % (01/09/17 0339) Vital Signs (24h Range):  Temp:  [97.7 °F (36.5 °C)-98.3 °F (36.8 °C)] 98.1 °F (36.7 °C)  Pulse:  [109-134] 109  Resp:  [18-19] 19  BP: (133-144)/() 139/87     Weight: 73.5 kg (162 lb)  Body mass index is 29.63 kg/(m^2).    Physical Exam   Constitutional: She is oriented to person, place, and time. She appears well-developed and well-nourished. No distress.   HENT:   Head: Normocephalic and atraumatic.   Eyes: Right eye exhibits no discharge. Left eye exhibits no discharge. Scleral icterus is present.   Neck: Neck supple. No JVD present.   Cardiovascular: Normal rate and regular rhythm.    Pulmonary/Chest: Effort normal and breath sounds normal. No respiratory distress.   Abdominal: Soft. Bowel sounds are normal. She exhibits no distension. There is tenderness.   Musculoskeletal: She exhibits no edema or tenderness.   Neurological: She is alert and oriented to person, place, and time.   Skin: Skin is warm and dry. She is not diaphoretic.   Psychiatric: She has a normal mood and affect. Her behavior is  normal.   Nursing note and vitals reviewed.      Significant Labs:   CBC:   Recent Labs  Lab 01/08/17  1838   WBC 13.80*   HGB 14.0   HCT 41.9   *     CMP:   Recent Labs  Lab 01/08/17  1838   *   K 3.9   CL 97   CO2 25   *   BUN 11   CREATININE 0.6   CALCIUM 11.6*   PROT 7.6   ALBUMIN 2.8*   BILITOT 4.2*   ALKPHOS 1927*   *   *   ANIONGAP 13   EGFRNONAA >60     Lab Results   Component Value Date    LIPASE 181 (H) 01/08/2017       Significant Imaging:     CT abdomen and pelvis (preliminary interpretation):  Acute pancreatitis. Dilated CBD.    Assessment/Plan:     * Pancreatitis, acute  PRN analgesics  NPO  IVF  Serial abdominal exams  Trend lipase, WBC, lactate, calcium  MRCP to rule out CBD stone  Consult GI      Hyperbilirubinemia  Trend      Elevated liver enzymes  Trend      Thrombocytosis  Reactive  Trend      VTE Risk Mitigation         Ordered     enoxaparin injection 40 mg  Daily     Route:  Subcutaneous        01/08/17 2200     Medium Risk of VTE  Once      01/08/17 2200        Shane Singer PA-C  Department of Hospital Medicine   [facility]

## 2017-01-10 NOTE — PROGRESS NOTES
"[facility]    CC: Elevated LFTs    HPI 46 y.o. female with elevated LFTs, recent onset, severe, associated with abnormal imaging indicative of metastatic disease with unknown primary, with no alleviating/exacerbating factors.  She feels OK today and has biopsy pending.  Case discussed with multiple providers today, including Dr. Arriola.      Past Medical History   Diagnosis Date    Neuropathy          Review of Systems  General ROS: negative for - chills, fever or weight loss  Cardiovascular ROS: no chest pain or dyspnea on exertion  Gastrointestinal ROS: No abdominal pain    Physical Examination  Visit Vitals    /75 (BP Location: Left arm, Patient Position: Lying, BP Method: Automatic)    Pulse 107    Temp 99.1 °F (37.3 °C)    Resp 18    Ht 5' 2" (1.575 m)    Wt 73.5 kg (162 lb)    LMP 12/21/2016 (Approximate)    SpO2 (!) 94%    Breastfeeding No    BMI 29.63 kg/m2     General appearance: alert, cooperative, no distress  HENT: Normocephalic, atraumatic, neck symmetrical, no nasal discharge, sclera anicteric   Lungs: clear to auscultation bilaterally, symmetric chest wall expansion bilaterally  Heart: regular rate and rhythm without rub; no displacement of the PMI   Abdomen: soft, NT + BS  Extremities: extremities symmetric; no clubbing, cyanosis, or edema  Neurologic: Alert and oriented X 3, no sensory or motor neurologic deficits      Labs:  CMP  Sodium   Date Value Ref Range Status   01/10/2017 135 (L) 136 - 145 mmol/L Final     Potassium   Date Value Ref Range Status   01/10/2017 3.6 3.5 - 5.1 mmol/L Final     Chloride   Date Value Ref Range Status   01/10/2017 97 95 - 110 mmol/L Final     CO2   Date Value Ref Range Status   01/10/2017 26 23 - 29 mmol/L Final     Glucose   Date Value Ref Range Status   01/10/2017 104 70 - 110 mg/dL Final     BUN, Bld   Date Value Ref Range Status   01/10/2017 5 (L) 6 - 20 mg/dL Final     Creatinine   Date Value Ref Range Status   01/10/2017 0.5 " 0.5 - 1.4 mg/dL Final     Calcium   Date Value Ref Range Status   01/10/2017 10.1 8.7 - 10.5 mg/dL Final     Total Protein   Date Value Ref Range Status   01/10/2017 6.1 6.0 - 8.4 g/dL Final     Albumin   Date Value Ref Range Status   01/10/2017 2.0 (L) 3.5 - 5.2 g/dL Final     Total Bilirubin   Date Value Ref Range Status   01/10/2017 5.3 (H) 0.1 - 1.0 mg/dL Final     Comment:     For infants and newborns, interpretation of results should be based  on gestational age, weight and in agreement with clinical  observations.  Premature Infant recommended reference ranges:  Up to 24 hours.............<8.0 mg/dL  Up to 48 hours............<12.0 mg/dL  3-5 days..................<15.0 mg/dL  6-29 days.................<15.0 mg/dL       Alkaline Phosphatase   Date Value Ref Range Status   01/10/2017 1690 (H) 55 - 135 U/L Final     AST   Date Value Ref Range Status   01/10/2017 97 (H) 10 - 40 U/L Final     ALT   Date Value Ref Range Status   01/10/2017 246 (H) 10 - 44 U/L Final     Anion Gap   Date Value Ref Range Status   01/10/2017 12 8 - 16 mmol/L Final     eGFR if    Date Value Ref Range Status   01/10/2017 >60 >60 mL/min/1.73 m^2 Final     eGFR if non    Date Value Ref Range Status   01/10/2017 >60 >60 mL/min/1.73 m^2 Final     Comment:     Calculation used to obtain the estimated glomerular filtration  rate (eGFR) is the CKD-EPI equation. Since race is unknown   in our information system, the eGFR values for   -American and Non--American patients are given   for each creatinine result.           Imaging:  All imaging reviewed    Assessment:   1.  Metastatic disease - unknown primary  2.  Elevated LFTs  3.  CBD dilatation      Plan:  1.  Agree with plan of biopsy of peripheral lesion per heme/onc  2.  After above, recommend transfer to main campus for EUS with FNA and to further evaluate bile duct as cholangiocarcinoma is possible as primary.  No definitive lung or breast  lesion noted and inflammatory changes noted in pancreas without discrete lesion, although pancreatic primary is still possible.   3.  Will follow.    Quirino Waldron MD  [facility]  [address], [address]  [place] [address]  Office: [phone]  Fax: [phone]

## 2017-01-11 PROBLEM — C79.51 METASTATIC CANCER TO BONE: Status: ACTIVE | Noted: 2017-01-01

## 2017-01-11 PROBLEM — K85.90 PANCREATITIS: Status: ACTIVE | Noted: 2017-01-01

## 2017-01-11 PROBLEM — R94.5 LIVER FUNCTION ABNORMALITY: Status: ACTIVE | Noted: 2017-01-01

## 2017-01-11 NOTE — PLAN OF CARE
Problem: Patient Care Overview  Goal: Plan of Care Review  Outcome: Ongoing (interventions implemented as appropriate)  Tolerating clear liquids well. Pain monitored every 1 to 2 hours with rounds. Ambulatory with steady gait. No falls or injury this shift.

## 2017-01-11 NOTE — PROGRESS NOTES
Patient discharged to [facility] via [facility]. Patients belongings placed on [facility] with patient. Message left for Kortney at [facility], patient is on their way. Care relinquished at this time.

## 2017-01-11 NOTE — PROGRESS NOTES
Attempted to call report to Kortney at [facility]. She is unable to take report and will call back.

## 2017-01-11 NOTE — IP AVS SNAPSHOT
[address]  [place] [address]  Phone: [phone]           I have received a copy of my After Visit Summary and discharge instructions from [facility].    INSTRUCTIONS RECEIVED AND UNDERSTOOD BY:                     Patient/Patient Representative: ________________________________________________________________     Date/Time: ________________________________________________________________                     Instructions Given By: ________________________________________________________________     Date/Time: ________________________________________________________________

## 2017-01-11 NOTE — ASSESSMENT & PLAN NOTE
CEA and  severely elevated. CT head, CT chest, CT abd/ pelv still does not clearly identify primary source of malignancy.   Pain control.   Biopsy of adrenal gland taken yesterday. Results pending.   Transfer to [facility] hospital today for EUS with FNA and to further evaluate bile duct as cholangiocarcinoma is possible as primary

## 2017-01-11 NOTE — H&P
[facility] Medicine  Transfer note    Patient Name: Kat Corley  MRN: [id number]  Admission Date: 1/8/2017  Attending Physician: Shahrzad Plasencia MD   Primary Care Provider: NAMITA Guerin         Patient information was obtained from patient and ER records.     Subjective:     Principal Problem:Cancer, metastatic    Chief Complaint:  Metastatic cancer to bone and other organs, origin unknown.     HPI: Mrs. Corley presents for evaluation of chest pain. She describes the pain as a burning sensation to her LEFT chest which is non-radiating and intermittent. She denies shortness of breath. She denies known exacerbating or alleviating factors. She reports nausea and aversion to food this week. She reports epigastric discomfort. She reports fever and chills. She denies trauma. She denies alcohol consumption. She denies Acetaminophen use. She denies yellowing of her skin. She denies recent travel or ingestion of raw or undercooked food. She denies diarrhea. She reports 30 pounds weight loss in the past 4 months. She is unsure why she has lost the weight.    She was evaluated in the ED. She was found to have elevated lipase, liver enzymes and bilirubin. A CT of the abdomen revealed pancreatitis and dilated CBD. She has history of cholecystectomy when she was 21.    Hospital course: Pt admitted for pancreatitis. Received IVFs, anti emetics, narcotics. Kept NPO. CT abd/ pelv significant for extensive metastatic lesions in vertebral spine and abdomen. GI and oncology consulted. CT brain, CT chest does not reveal any primary source of malignancy. CEA and  significantly elevated. Pt taken to OR yesterday for biopsy of adrenal gland. Results pending. Pain was well controlled with IV narcotics. She will be transferred to [facility] for EUS with FNA and to further evaluate bile duct as cholangiocarcinoma is possible as primary. Case discussed with Dr. Kim who will admit  the patient to her hospital service. PBS consult requested.     Interval History: pt with uneventful night. She is sleeping fairly. Pain is well controlled.     Review of Systems   Constitutional: Negative for chills and fever.   Respiratory: Negative for shortness of breath.    Cardiovascular: Negative for chest pain.   Gastrointestinal: Negative for abdominal pain.   Genitourinary: Negative for dysuria.   Neurological: Negative for dizziness and light-headedness.     Objective:     Vital Signs (Most Recent):  Temp: 97.7 °F (36.5 °C) (01/11/17 0800)  Pulse: 109 (01/11/17 0800)  Resp: 18 (01/11/17 0800)  BP: 118/86 (01/11/17 0800)  SpO2: (!) 93 % (01/11/17 0800) Vital Signs (24h Range):  Temp:  [97.7 °F (36.5 °C)-99.1 °F (37.3 °C)] 97.7 °F (36.5 °C)  Pulse:  [102-118] 109  Resp:  [16-19] 18  BP: (113-130)/(70-86) 118/86     Weight: 73.5 kg (162 lb)  Body mass index is 29.63 kg/(m^2).    Intake/Output Summary (Last 24 hours) at 01/11/17 1143  Last data filed at 01/11/17 0558   Gross per 24 hour   Intake          3222.92 ml   Output                0 ml   Net          3222.92 ml      Physical Exam   Constitutional: She appears well-developed and well-nourished. No distress.   HENT:   Head: Normocephalic and atraumatic.   Right Ear: External ear normal.   Left Ear: External ear normal.   Nose: Nose normal.   Mouth/Throat: Oropharynx is clear and moist. No oropharyngeal exudate.   Eyes: Conjunctivae are normal.   Neck: Neck supple. No JVD present.   Cardiovascular: Normal rate, regular rhythm and normal heart sounds.    Pulmonary/Chest: Effort normal and breath sounds normal. No respiratory distress.   Abdominal: Soft. Bowel sounds are normal. She exhibits no distension. There is no tenderness.   Musculoskeletal: She exhibits no edema.   Neurological: She is alert.   Psychiatric: She has a normal mood and affect. Her behavior is normal.   Vitals reviewed.      Significant Labs:   CMP:   Recent Labs  Lab 01/10/17  0700  01/11/17  0533   * 135*   K 3.6 3.8   CL 97 95   CO2 26 30*    114*   BUN 5* 4*   CREATININE 0.5 0.5   CALCIUM 10.1 10.3   PROT 6.1 5.8*   ALBUMIN 2.0* 1.9*   BILITOT 5.3* 6.0*   ALKPHOS 1690* 1581*   AST 97* 87*   * 176*   ANIONGAP 12 10   EGFRNONAA >60 >60       Significant Imaging: I have reviewed all pertinent imaging results/findings within the past 24 hours.    Assessment/Plan:     * Cancer, metastatic  CEA and  severely elevated. CT head, CT chest, CT abd/ pelv still does not clearly identify primary source of malignancy.   Pain control.   Biopsy of adrenal gland taken yesterday. Results pending.   Transfer to [facility] today for EUS with FNA and to further evaluate bile duct as cholangiocarcinoma is possible as primary      Prediabetes  A1C 6.4. Monitor with dietary changes.       Hyperbilirubinemia  Stable. Trending daily.       COPD (chronic obstructive pulmonary disease)  Duonebs prn. O2 prn.       Pancreatitis, acute  IVFs. Pain control. Likely 2/2 stenotic CBD.      Elevated liver enzymes  Stable. Trending daily.      Thrombocytosis  Stable. Monitor.       Elevated serum alkaline phosphatase level  Stable. Likely 2/2 metastatic bone lesions.       Hypomagnesemia  Monitor. Replete prn.       Cigarette smoker  Makes her high risk for malignancy. Encouraged cessation indefinitely.       VTE Risk Mitigation         Ordered     enoxaparin injection 40 mg  Daily     Route:  Subcutaneous        01/08/17 2200     Medium Risk of VTE  Once      01/08/17 2200        Shahrzad Plasencia MD  Department of Hospital Medicine   [facility]

## 2017-01-11 NOTE — IP AVS SNAPSHOT
[facility]  [address]  [facility] [address]  Phone: [phone]           Patient Discharge Instructions     Our goal is to set you up for success. This packet includes information on your condition, medications, and your home care. It will help you to care for yourself so you don't get sicker and need to go back to the hospital.     Please ask your nurse if you have any questions.        There are many details to remember when preparing to leave the hospital. Here is what you will need to do:    1. Take your medicine. If you are prescribed medications, review your Medication List in the following pages. You may have new medications to  at the pharmacy and others that you'll need to stop taking. Review the instructions for how and when to take your medications. Talk with your doctor or nurses if you are unsure of what to do.     2. Go to your follow-up appointments. Specific follow-up information is listed in the following pages. Your may be contacted by a transition nurse or clinical provider about future appointments. Be sure we have all of the phone numbers to reach you, if needed. Please contact your provider's office if you are unable to make an appointment.     3. Watch for warning signs. Your doctor or nurse will give you detailed warning signs to watch for and when to call for assistance. These instructions may also include educational information about your condition. If you experience any of warning signs to your health, call your doctor.               [facility]  Unless otherwise directed by your provider, please contact [facility], our nurse care line that is available for 24/7 assistance.     [phone] (toll-free)    Registered nurses in the [facility] provide clinical advisement, health education, appointment booking, and other advisory services.                    ** Verify the list of medication(s) below is accurate and up  to date. Carry this with you in case of emergency. If your medications have changed, please notify your healthcare provider.             Medication List      START taking these medications        Additional Info    Begin Date AM Noon PM Bedtime    morphine 30 MG 12 hr tablet   Commonly known as:  MS CONTIN   Quantity:  60 tablet   Refills:  0   Dose:  30 mg    Last time this was given:  30 mg on 1/13/2017  8:50 AM   Instructions:  Take 1 tablet (30 mg total) by mouth every 12 (twelve) hours.                               nicotine 14 mg/24 hr   Commonly known as:  NICODERM CQ   Refills:  0   Dose:  1 patch    Last time this was given:  1 patch on 1/13/2017  8:48 AM   Instructions:  Place 1 patch onto the skin once daily.                            oxycodone 10 mg Tab immediate release tablet   Commonly known as:  ROXICODONE   Quantity:  60 tablet   Refills:  0   Dose:  10 mg    Instructions:  Take 1 tablet (10 mg total) by mouth every 4 (four) hours as needed for Pain.                            polyethylene glycol 17 gram Pwpk   Commonly known as:  GLYCOLAX   Quantity:  30 each   Refills:  2   Dose:  17 g    Last time this was given:  17 g on 1/13/2017  8:49 AM   Instructions:  Take 17 g by mouth once daily.                               senna-docusate 8.6-50 mg 8.6-50 mg per tablet   Commonly known as:  PERICOLACE   Refills:  0   Dose:  1 tablet    Last time this was given:  1 tablet on 1/13/2017  8:50 AM   Instructions:  Take 1 tablet by mouth once daily.                                 CONTINUE taking these medications        Additional Info    Begin Date AM Noon PM Bedtime    gabapentin 300 MG capsule   Commonly known as:  NEURONTIN   Refills:  0   Dose:  300 mg    Last time this was given:  300 mg on 1/13/2017  1:09 PM   Instructions:  Take 300 mg by mouth 3 (three) times daily.                               ONE DAILY MULTIVITAMIN per tablet   Refills:  0   Dose:  1 tablet   Generic drug:  multivitamin     Instructions:  Take 1 tablet by mouth once daily.                               VITAMIN B-12 100 MCG tablet   Refills:  0   Dose:  100 mcg   Generic drug:  cyanocobalamin    Instructions:  Take 100 mcg by mouth once daily.                               VITAMIN B-6 100 MG Tab   Refills:  0   Dose:  100 mg   Generic drug:  pyridoxine (vitamin B6)    Instructions:  Take 100 mg by mouth once daily.                                    Where to Get Your Medications      These medications were sent to [facility]/pharmacy #[id number] - [place], [place] - [address]  [address] [place] [address] [address]     Phone:  [phone]     polyethylene glycol 17 gram Pwpk         You can get these medications from any pharmacy     Bring a paper prescription for each of these medications     morphine 30 MG 12 hr tablet    oxycodone 10 mg Tab immediate release tablet       You don't need a prescription for these medications     nicotine 14 mg/24 hr    senna-docusate 8.6-50 mg 8.6-50 mg per tablet                  Please bring to all follow up appointments:    1. A copy of your discharge instructions.  2. All medicines you are currently taking in their original bottles.  3. Identification and insurance card.    Please arrive 15 minutes ahead of scheduled appointment time.    Please call 24 hours in advance if you must reschedule your appointment and/or time.          Discharge Instructions     Future Orders    Activity as tolerated     Call MD for:  persistent nausea and vomiting or diarrhea     Call MD for:  severe uncontrolled pain     Call MD for:  temperature >100.4     Diet general     Questions:    Total calories:      Fat restriction, if any:      Protein restriction, if any:      Na restriction, if any:      Fluid restriction:      Additional restrictions:      OXYGEN FOR HOME USE     Questions:    Liter Flow:  2    Duration:  Continuous    Qualifying SpO2:  SpO2 86% on RA and 79% with exertion on RA    Testing done at:  Exercise/Activity  "   Route:  nasal cannula    Portable mode:      Device:  home concentrator with portable unit    Length of need (in months):  12 mos    Patient condition with qualifying saturation:  COPD    Height:  5' 2" (1.575 m)    Weight:  76.3 kg (168 lb 3.2 oz)    Does patient have medical equipment at home?:  none    Alternative treatment measures have been tried or considered and deemed clinically ineffective.:  Yes    Vendor:  [employer] [facility] Comment - [facility] to deliver, orders given to [facility]    Expected Date of Delivery:  1/13/2017    Expected Time of Delivery:          Discharge Instructions         Upper GI Endoscopy     During endoscopy, a long, flexible tube is used to view the inside of your upper GI tract.      Upper GI endoscopy allows your healthcare provider to look directly into the beginning of your gastrointestinal (GI) tract. The esophagus, stomach, and duodenum (the first part of the small intestine) make up the upper GI tract.   Before the exam  Follow these and any other instructions you are given before your endoscopy. If you dont follow the healthcare providers instructions carefully, the test may need to be canceled or done over:  · Don't eat or drink anything after midnight the night before your exam. If your exam is in the afternoon, drink only clear liquids in the morning. Don't eat or drink anything for 8 hours before the exam. In some cases, you may be able to take medicines with sips of water until 2 hours before the procedure. Speak with your healthcare provider about this.   · Bring your X-rays and any other test results you have.  · Because you will be sedated, arrange for an adult to drive you home after the exam.  · Tell your healthcare provider before the exam if you are taking any medicines or have any medical problems.  The procedure  Here is what to expect:  · You will lie on the endoscopy table. Usually patients lie on the left side.  · You will be monitored and given oxygen.  · Your throat " may be numbed with a spray or gargle. You are given medicine through an intravenous (IV) line that will help you relax and remain comfortable. You may be awake or asleep during the procedure.  · The healthcare provider will put the endoscope in your mouth and down your esophagus. It is thinner than most pieces of food that you swallow. It will not affect your breathing. The medicine helps keep you from gagging.  · Air is put into your GI tract to expand it. It can make you burp.  · During the procedure, the healthcare provider can take biopsies (tissue samples), remove abnormalities, such as polyps, or treat abnormalities through a variety of devices placed through the endoscope. You will not feel this.   · The endoscope carries images of your upper GI tract to a video screen. If you are awake, you may be able to look at the images.  · After the procedure is done, you will rest for a time. An adult must drive you home.  When to call your healthcare provider  Contact your healthcare provider if you have:  · Black or tarry stools, or blood in your stool  · Fever  · Pain in your belly that does not go away  · Nausea and vomiting, or vomiting blood   © 7544-2264 [employer]. [address] [place] [address]. All rights reserved. This information is not intended as a substitute for professional medical care. Always follow your healthcare professional's instructions.          Discharge References/Attachments     OXYGEN, USING AT HOME: DISCHARGE INSTRUCTIONS (ENGLISH)    OXYGEN, USING SAFELY (ENGLISH)        Primary Diagnosis     Your primary diagnosis was:  Multiple Lesions Of Metastatic Malignancy      Admission Information     Date & Time Provider Department CSN    1/11/2017  5:23 PM Nati Saldana MD [facility] [id number]      Care Providers     Provider Role Specialty Primary office phone    Nati Saldana MD Attending Provider Hematology and Oncology [phone]    Tiffanie SLOAN  "Sergio Whitaker MD Consulting Physician  Gastroenterology [phone]    Stanley Rosales MD Consulting Physician  Gastroenterology [phone]    Get Stratton MD Consulting Physician  Gastroenterology [phone]    Get Stratton MD Surgeon  Gastroenterology [phone]    Nati Saldana MD Team Attending  Hematology and Oncology [phone]      Your Vitals Were     BP Pulse Temp Resp Height Weight    131/77 (BP Location: Right arm, Patient Position: Sitting, BP Method: Automatic) 95 98.6 °F (37 °C) (Oral) 24 5' 2" (1.575 m) 76.3 kg (168 lb 3.2 oz)    Last Period SpO2 BMI          12/21/2016 (Approximate) 92% 30.76 kg/m2        Recent Lab Values        1/9/2017                           5:45 AM           A1C 6.4 (H)           Comment for A1C at  5:45 AM on 1/9/2017:  According to ADA guidelines, hemoglobin A1C <7.0% represents  optimal control in non-pregnant diabetic patients.  Different  metrics may apply to specific populations.   Standards of Medical Care in Diabetes - 2016.  For the purpose of screening for the presence of diabetes:  <5.7%     Consistent with the absence of diabetes  5.7-6.4%  Consistent with increasing risk for diabetes   (prediabetes)  >or=6.5%  Consistent with diabetes  Currently no consensus exists for use of hemoglobin A1C  for diagnosis of diabetes for children.        Pending Labs     Order Current Status    Tissue Specimen to Pathology In process    Tissue Specimen to Pathology In process      Allergies as of 1/13/2017     No Known Allergies      Advance Directives     An advance directive is a document which, in the event you are no longer able to make decisions for yourself, tells your healthcare team what kind of treatment you do or do not want to receive, or who you would like to make those decisions for you.  If you do not currently have an advance directive, [facility] encourages you to create one.  For more information call:  [phone] ([phone]), [phone] " ([phone]),  or log on to [email or web address].        Smoking Cessation     If you would like to quit smoking:   You may be eligible for free services if you are a [place] resident and started smoking cigarettes before September 1, 1988.  Call the Smoking Cessation Trust (SCT) toll free at (961) 116-5870 or (603) 713-1890.   Call 1-800-QUIT-NOW if you do not meet the above criteria.            Language Assistance Services     ATTENTION: Language assistance services are available, free of charge. Please call [phone].      ATENCIÓN: Si habla español, tiene a bryant disposición servicios gratuitos de asistencia lingüística. Llame al [phone].     CHÚ Ý: N?u b?n nói Ti?ng Vi?t, có các d?ch v? h? tr? ngôn ng? mi?n phí dành cho b?n. G?i s? [phone].        [facility] Sign-Up     Activating your [facility] account is as easy as 1-2-3!     1) Visit [email or web address], select Sign Up Now, enter this activation code and your date of birth, then select Next.  [email or web address]  Expires: 2/27/2017  1:08 PM      2) Create a username and password to use when you visit [facility] in the future and select a security question in case you lose your password and select Next.    3) Enter your e-mail address and click Sign Up!    Additional Information  If you have questions, please e-mail [email or web address] or call [phone] to talk to our [facility] staff. Remember, [facility] is NOT to be used for urgent needs. For medical emergencies, dial 911.          [facility] complies with applicable Federal civil rights laws and does not discriminate on the basis of race, color, national origin, age, disability, or sex.

## 2017-01-11 NOTE — PLAN OF CARE
Hospital Medicine Consult Team - [facility] Med Team M    Requesting Physician for transfer to [facility] Medicine service /Team: Dr. Plasencia at [facility]     Code Status: Prior     Accepting Physician: Charles Kim     Date of Request for transfer: 01/11/2017     Reason for request for transfer to medicine service: Further inpatient hospitalization for management of metastatic CA.     Hospital Course: Patient is a 46 y.o. admitted with chest and abdominal pain. Diagnosed with pancreatitis but now found to have metastatic CA. Adrenal and breast biopsy yesterday and path pending. Primary unknown. CBD dilatation and stenosis. GI and oncology have seen the patient but requesting transfer for EUS and FNA. CA 19-9 elevated. PMH of COPD; active smoker. Mother had lung CA and father had brain tumor. Maternal grandmother with breast CA.         To Do List: PBS     Anticipated Discharge Disposition: Home or Self Care vs sub-acute care.       Please call [phone] and dial [phone] to notify medicine upon patient's arrival.

## 2017-01-12 NOTE — TRANSFER OF CARE
"Anesthesia Transfer of Care Note    Patient: Kat Corley    Procedure(s) Performed: Procedure(s) (LRB):  ULTRASOUND-ENDOSCOPIC-UPPER (N/A)  ERCP (N/A)    Patient location: [facility]    Anesthesia Type: general    Transport from OR: Transported from OR on 6-10 L/min O2 by face mask with adequate spontaneous ventilation    Post pain: adequate analgesia    Post assessment: no apparent anesthetic complications    Post vital signs: stable    Level of consciousness: sedated    Nausea/Vomiting: no vomiting    Complications: none          Last vitals:   Visit Vitals    /78 (BP Location: Left arm, Patient Position: Lying, BP Method: Automatic)    Pulse 102    Temp 36.8 °C (98.3 °F) (Oral)    Resp 18    Ht 5' 2" (1.575 m)    Wt 76.3 kg (168 lb 3.2 oz)    LMP 12/21/2016 (Approximate)    SpO2 (!) 94%    Breastfeeding No    BMI 30.76 kg/m2     "

## 2017-01-12 NOTE — ASSESSMENT & PLAN NOTE
- 2/2 obstruction likely malignancy  - CBD noted on imaging with intrahepatic and extrahepatic dilations  - T bili has been up trending, now 6  - Alk phos 1500's  - AST and ALT down trending  - AES consulted, EUS/FNA planned for today  - f/u results

## 2017-01-12 NOTE — ASSESSMENT & PLAN NOTE
- unknown primary  -  >6000  -CT abd abdomen, pelvis , chest and head done in OSH  - superficial skin nodule biopsy done in OSH  -obtain result  -consult oncology in am

## 2017-01-12 NOTE — ASSESSMENT & PLAN NOTE
- likely due to obstruction likely malignancy  - CBD noted on imaging with intrahepatic and extrahepatic dilations  - T bili has been up trending  -alk phos , AST and ALT down trending  -consult PBS in am for EUS and possible FNA

## 2017-01-12 NOTE — ANESTHESIA RELEASE NOTE
"Anesthesia Release from PACU Note    Patient: Kat Corley    Procedure(s) Performed: Procedure(s) (LRB):  ULTRASOUND-ENDOSCOPIC-UPPER (N/A)  ERCP (N/A)  Anesthesia Release from PACU Note    Patient: Kat Corley    Procedure(s) Performed: Procedure(s) (LRB):  ULTRASOUND-ENDOSCOPIC-UPPER (N/A)  ERCP (N/A)    Anesthesia type: GEN    Post pain: Adequate analgesia reported    Post assessment: no apparent anesthetic complications, tolerated procedure well and no evidence of recall    Post vital signs:   Visit Vitals    BP (!) 137/92    Pulse 101    Temp 36.7 °C (98 °F) (Axillary)    Resp 18    Ht 5' 2" (1.575 m)    Wt 76.3 kg (168 lb 3.2 oz)    LMP 12/21/2016 (Approximate)    SpO2 (!) 93%    Breastfeeding No    BMI 30.76 kg/m2       Level of consciousness: awake, alert and oriented    Nausea/Vomiting: no nausea/no vomiting    Complications: none    Airway Patency: patent    Respiratory: unassisted, spontaneous ventilation, room air    Cardiovascular: stable and blood pressure at baseline    Hydration: euvolemic    "

## 2017-01-12 NOTE — ANESTHESIA POSTPROCEDURE EVALUATION
"Anesthesia Post Evaluation    Patient: Kat Corley    Procedure(s) Performed: Procedure(s) (LRB):  ULTRASOUND-ENDOSCOPIC-UPPER (N/A)  ERCP (N/A)    Final Anesthesia Type: general  Patient location during evaluation: PACU  Patient participation: Yes- Able to Participate  Level of consciousness: awake and alert  Post-procedure vital signs: reviewed and stable  Pain management: adequate  Airway patency: patent  PONV status at discharge: No PONV  Anesthetic complications: no      Cardiovascular status: stable  Respiratory status: unassisted and spontaneous ventilation  Hydration status: euvolemic  Follow-up not needed.        Visit Vitals    BP (!) 137/92    Pulse 101    Temp 36.7 °C (98 °F) (Axillary)    Resp 18    Ht 5' 2" (1.575 m)    Wt 76.3 kg (168 lb 3.2 oz)    LMP 12/21/2016 (Approximate)    SpO2 (!) 93%    Breastfeeding No    BMI 30.76 kg/m2       Pain/Kalpana Score: Pain Assessment Performed: Yes (1/12/2017  1:45 PM)  Presence of Pain: denies (1/12/2017  1:45 PM)  Pain Rating Prior to Med Admin: 8 (1/12/2017 10:18 AM)  Pain Rating Post Med Admin: 5 (1/12/2017  3:22 AM)  Kalpana Score: 9 (1/12/2017  1:45 PM)  Modified Kalpana Score: 17 (1/12/2017  1:45 PM)      "
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
## 2017-01-12 NOTE — PLAN OF CARE
Problem: Patient Care Overview  Goal: Plan of Care Review  Outcome: Ongoing (interventions implemented as appropriate)  Pt. with nonskid footwear on with bed in lowest position and locked with bed rails up x2.  Pt. instructed to call prior to getting OOB.  Pt. with call light within reach and verbalized understanding.  Pt. with EUS & ERCP complete this today.  Pt. received magnesium replacement.  Pt. with c/o abdominal pain and back pain with MS contin started and dilaudid being given PRN.  Pt. Tolerating regular diet.  Pt. given lactulose for constipation.  Pt. is pending a lumbar and thoracic spine MRI, whole body scan and mammogram.  Will continue to monitor pt.

## 2017-01-12 NOTE — ANESTHESIA PREPROCEDURE EVALUATION
2017  Kat Corley is a 46 y.o., female.  Pre-operative evaluation for Procedure(s) (LRB):  ULTRASOUND-ENDOSCOPIC-UPPER (N/A)  ERCP (N/A)    Kat Corley is a 46 y.o. female     Patient Active Problem List   Diagnosis    Pancreatitis, acute    Hyperbilirubinemia    Elevated liver enzymes    Thrombocytosis    Multiple lesions of metastatic malignancy    Prediabetes    Elevated serum alkaline phosphatase level    Hypomagnesemia    COPD (chronic obstructive pulmonary disease)    Cigarette smoker    Pancreatitis    Metastatic cancer to bone    Liver function abnormality       Review of patient's allergies indicates:  No Known Allergies    No current facility-administered medications on file prior to encounter.      Current Outpatient Prescriptions on File Prior to Encounter   Medication Sig Dispense Refill    gabapentin (NEURONTIN) 300 MG capsule Take 300 mg by mouth 3 (three) times daily.         Past Surgical History   Procedure Laterality Date    Tonsillectomy       section      Tubal ligation         Social History     Social History    Marital status:      Spouse name: N/A    Number of children: N/A    Years of education: N/A     Occupational History    Not on file.     Social History Main Topics    Smoking status: Current Every Day Smoker     Packs/day: 0.50     Years: 15.00     Types: Cigarettes    Smokeless tobacco: Not on file    Alcohol use No    Drug use: Not on file    Sexual activity: Yes     Other Topics Concern    Not on file     Social History Narrative         Vital Signs Range (Last 24H):  Temp:  [35.9 °C (96.6 °F)-37.3 °C (99.1 °F)]   Pulse:  [102-110]   Resp:  [18-24]   BP: (110-127)/(68-81)   SpO2:  [93 %-95 %]       CBC:   Recent Labs      17   1847  17   0557   WBC  7.76  9.16   RBC  3.49*  3.55*   HGB  10.4*  10.6*   HCT  30.5*   31.9*   PLT  335  365*   MCV  87  90   MCH  29.8  29.9   MCHC  34.1  33.2       CMP:   Recent Labs      01/11/17 1847 01/12/17   0557   NA  134*  137   K  3.9  4.0   CL  97  99   CO2  31*  29   BUN  5*  5*   CREATININE  0.5  0.5   GLU  116*  116*   MG  1.2*  1.3*   PHOS  4.0  4.0   CALCIUM  10.0  10.3   ALBUMIN  1.9*  1.9*   PROT  6.1  6.3   ALKPHOS  1512*  1503*   ALT  161*  150*   AST  84*  87*   BILITOT  6.3*  6.7*       INR  Recent Labs      01/11/17 1847 01/12/17   0557   INR  1.1  1.1           [facility] Anesthesia Evaluation    I have reviewed the Patient Summary Reports.     I have reviewed the Medications.     Review of Systems  Anesthesia Hx:  No problems with previous Anesthesia  History of prior surgery of interest to airway management or planning:  Denies Personal Hx of Anesthesia complications.   Social:  Smoker    Hematology/Oncology:        Current/Recent Cancer.   Cardiovascular:  Cardiovascular Normal     Hepatic/GI:   pancreatitis   Neurological:  Neurology Normal    Endocrine:  Endocrine Normal        Physical Exam  General:  Jaundice    Airway/Jaw/Neck:  Airway Findings: Mouth Opening: Normal Tongue: Normal  General Airway Assessment: Adult  Improves to I with phonation.  TM Distance: Normal, at least 6 cm      Dental:  Dental Findings: Upper Dentures        Mental Status:  Mental Status Findings:  Cooperative, Alert and Oriented         Anesthesia Plan  Type of Anesthesia, risks & benefits discussed:  Anesthesia Type:  general  Patient's Preference:   Intra-op Monitoring Plan:   Intra-op Monitoring Plan Comments:   Post Op Pain Control Plan:   Post Op Pain Control Plan Comments:   Induction:   IV  Beta Blocker:  Patient is not currently on a Beta-Blocker (No further documentation required).       Informed Consent: Patient understands risks and agrees with Anesthesia plan.  Questions answered. Anesthesia consent signed with patient.  ASA Score: 3     Day of Surgery Review of History & Physical:     H&P update referred to the surgeon.         Ready For Surgery From Anesthesia Perspective.

## 2017-01-12 NOTE — CONSULTS
[facility]  Adult Nutrition  Consult Note    SUMMARY     Recommendations    Recommendation/Intervention:   1. When medically able and as tolerated, consider advancing diet to low-fat/high protein diet per pancreatitis diagnosis.   2. As tolerated, encourage optimal po intake >75% meals of current diet.    3. If unable to tolerate diet, consult RD for further MNT.RD to monitor and follow up     Goals: Pt will advance diet within 48 hours as tolerated from CLD  Nutrition Goal Status: new     Continuum of Care Plan    Referral to Outpatient Services: other (see comments) (Nutrition D/C Planning: Will assess daily)    Diagnosis  1. Obstructive jaundice    2. Pancreatitis      Past Medical History   Diagnosis Date    Neuropathy      Reason for Assessment    Reason for Assessment: nurse/nurse practitioner consult  Diagnosis: cancer diagnosis/related complications (pancreatitis)  Relevent Medical History: COPD, smoker, LLE neuropathy   Interdisciplinary Rounds: did not attend (med onc)     General Information Comments: Pt c/o back pain. Attributing weight loss to gabapentin. Notes she has a good appetite and feeling very hungry.     Nutrition Prescription Ordered    Current Diet Order: CLD     Evaluation of Received Nutrients/Fluid Intake     Oral Fluid (mL): 360      Nutrition Risk Screen     Nutrition Risk Screen: unintentional loss of 10 lbs or more in the past 2 mos    Nutrition/Diet History    Patient Reported Diet/Restrictions/Preferences: general     Food Preferences:  (no cultural or [employer] needs identified)   Factors Affecting Nutritional Intake:  (-)     Labs/Tests/Procedures/Meds     Pertinent Labs Reviewed: reviewed  Pertinent Labs Comments: Glu 116, Mg 1.3, Alk phos 1503, AST/ALT 81/150, TBili 6.7  Pertinent Medications Reviewed: reviewed  Pertinent Medications Comments: gabapentin    Physical Findings    Overall Physical Appearance: obese     Oral/Mouth Cavity: WDL  Skin:  intact    Anthropometrics     Height (inches): 62.01 in  Weight Method: Bed Scale  Weight (kg): 76.3 kg     Ideal Body Weight (IBW), Female: 110.05 lb     % Ideal Body Weight, Female (lb): 152.85 lb  BMI (kg/m2): 30.76  BMI Grade: 30 - 34.9- obesity - grade I  Usual Body Weight (UBW), k kg  % Usual Body Weight: 89.76  % Weight Change: 9 kg (11% x4 months)  Weight Loss: unintentional      Estimated/Assessed Needs    Weight Used For Calorie Calculations: 76.3 kg (168 lb 3.4 oz)   Height (cm): 157.5 cm     Energy Need Method: Kcal/kg (0465-4657 kcal (25-30 kcal/kg))   RMR ([place]-St. Jeor Equation): 1369.13        Weight Used For Protein Calculations: 76.3 kg (168 lb 3.4 oz)  Protein Requirements: 76-92 g (1.0-1.2 g/kg)    Fluid Need Method: RDA Method (or per MD)      Malnutrition (Undernutrition) Diagnosis    % Intake of Estimated Energy Needs: 50 - 75%  % Meal Intake:  (CLD)  Malnutrition Reason: acute, illness related      Severe Wt. Loss Chronic Illness: greater than 7.5% in 3 months    Nutrition Diagnosis    Nutrition Problem: Increased nutrient needs  Etiology/Related To: disease process  Nutrition Diagnosis Signs/Symptoms As Evidenced By: increased energy expenditure  Nutrition Diagnosis Status: New    Monitor and Evaluation    Food and Nutrient Intake: energy intake, food and beverage intake  Food and Nutrient Adminstration: diet order     Physical Activity and Function: nutrition-related ADLs and IADLs  Anthropometric Measurements: weight, weight change, body mass index  Biochemical Data, Medical Tests and Procedures: electrolyte and renal panel, gastrointestinal profile, glucose/endocrine profile, inflammatory profile, lipid profile  Nutrition-Focused Physical Findings: overall appearance    Nutrition Risk    Level of Risk: moderate    Nutrition Follow-Up    RD Follow-up?: Yes

## 2017-01-12 NOTE — CONSULTS
Consult Note  Hematology/Oncology      Consult Requested By: Hung Cruz MD    Reason for Consult: Metastatic lesion, unknown primary    SUBJECTIVE:     History of Present Illness:  Patient is a 46 y.o. female pmhx COPD presented to [facility] with intermittent, non radiating chest pain. She reports nausea, anorexia and epigastric pain. She has had approx 30 lb weight loss over the past 4 months. Denies any sob, wheezing. She was found to have elevated lipase,  Bilirubin and transaminases. She underwent CT abd at OSH which revealed pancreatitis, dilated CBD and extensive osteolytic bone lesions in lumbar and thoracic spine concerning for metastatic disease. In addition, found to have adrenal masses bilaterally and mass between stomach and spleen. Ct chest and ct head did not reveal any primary source of malignancy. She underwent biopsy of ant abdominal wall nodule, path pending. Transferred to [facility] for evaluation by [facility] for potential EUS to further evaluate CBD.    Last mammogram and pap smear were 4 years ago which was normal. No hx of abnormal pap smears. No prior colonoscopy. She works as . She lives in [place] with boyfriend and daughter.  Family history significant for father with brain tumor at 44, mother lung cancer both have passed away within the past year and paternal grandmother had breast cancer.  Continuous Infusions:   sodium chloride 0.9% 150 mL/hr at 01/12/17 0740     Scheduled Meds:   gabapentin  300 mg Oral TID    morphine  15 mg Oral Q12H    nicotine  1 patch Transdermal Daily    polyethylene glycol  17 g Oral Daily     PRN Meds:albuterol-ipratropium 2.5mg-0.5mg/3mL, dextrose 50%, dextrose 50%, glucagon (human recombinant), glucose, glucose, HYDROmorphone, insulin aspart, ondansetron, promethazine (PHENERGAN) IVPB    Past Medical History   Diagnosis Date    Neuropathy      History reviewed. No pertinent past surgical history.  History reviewed. No pertinent family  history.  Social History   Substance Use Topics    Smoking status: Current Every Day Smoker     Packs/day: 0.50     Years: 15.00     Types: Cigarettes    Smokeless tobacco: None    Alcohol use No       Review of patient's allergies indicates:  No Known Allergies     Review of Systems:  Constitutional: no fever or chills, ECO  Eyes: no visual changes  ENT: no nasal congestion or sore throat  Respiratory: positive for dyspnea on exertion, negative for cough, hemoptysis, pleurisy/chest pain and wheezing  Cardiovascular: no chest pain or palpitations  Gastrointestinal: positive for abdominal pain, jaundice, nausea and vomiting, negative for bright red blood per rectum, change in bowel habits, constipation, dyspepsia and dysphagia  Genitourinary: no hematuria or dysuria  Integument/Breast: no rash or pruritis, negative for breast lump, pruritus and rash  Hematologic/Lymphatic: no easy bruising or lymphadenopathy  Musculoskeletal: no arthralgias or myalgias  Neurological: no seizures or tremors  Behavioral/Psych: no auditory or visual hallucinations  Endocrine: no heat or cold intolerance    OBJECTIVE:     Vital Signs (Most Recent)  Temp: 98.5 °F (36.9 °C) (17)  Pulse: 104 (17)  Resp: (!) 24 (17)  BP: 120/68 (17)  SpO2: (!) 93 % (17)    Pain Assessment: 4    Vital Signs Range (Last 24H):  Temp:  [96.6 °F (35.9 °C)-99.1 °F (37.3 °C)]   Pulse:  [104-110]   Resp:  [18-24]   BP: (110-126)/(68-81)   SpO2:  [93 %-95 %]     Physical Exam:  General: well developed, well nourished, mild distress  Eyes: conjunctivae/corneas clear. PERRL..  HENT: Head:normocephalic, atraumatic. Ears:bilateral TM's and external ear canals normal, not examined. Nose: Nares normal. Septum midline. Mucosa normal. No drainage or sinus tenderness., no discharge. Throat: lips, mucosa, and tongue normal; teeth and gums normal and no throat erythema.  Neck: supple, symmetrical, trachea midline, no  JVD and thyroid not enlarged, symmetric, no tenderness/mass/nodules  Lungs:  clear to auscultation bilaterally and normal respiratory effort  Cardiovascular: Heart: regular rate and rhythm, S1, S2 normal, no murmur, click, rub or gallop. Chest Wall: no tenderness. Extremities: no cyanosis or edema, or clubbing. Pulses: 2+ and symmetric.  Breasts:  No axillary or supraclavicular adenopathy, negative findings: normal in size and symmetry, normal contour with no evidence of flattening or dimpling, skin normal, nipples everted without rashes or discharge, positive findings: 0.5 cm, smooth, round and hard nodule located on the left lower inner quadrant and 0.5 cm smooth,round, hard nodule medial to nipple  Abdomen/Rectal: Abdomen: normal findings: bowel sounds normal and spleen non-palpable and abnormal findings:  tender to palpation in epigastric. Rectal: normal tone, no masses or tenderness and not examined. Positive CVA tenderness  Skin: no evidence of bleeding or bruising and no lesions noted. + jaundice  Lymph Nodes: No cervical or supraclavicular adenopathy  Neurologic: Normal strength and tone. No focal numbness or weakness  Psych/Behavioral:  Normal. and Alert and oriented, appropriate affect.    Laboratory:  CBC with Differential:    Recent Labs  Lab 01/12/17  0557   WBC 9.16   LYMPH 16.6*  1.5   BASOPHIL 0.2   RBC 3.55*   HCT 31.9*   HGB 10.6*   MCV 90   MCH 29.9   RDW 14.4   *   MPV 11.0     CMP:    Recent Labs  Lab 01/12/17  0557   *   CALCIUM 10.3   ALBUMIN 1.9*   PROT 6.3      K 4.0   CO2 29   CL 99   BUN 5*   CREATININE 0.5   ALKPHOS 1503*   *   AST 87*   BILITOT 6.7*     CEA: 5455.7  CA 19-9: >79927    Diagnostic Results:  CT Abd/pelvis with contrast 1/8/17  Extensive rounded osteolytic bone metastases are identified in the sacrum lumbar and thoracic spines.  Bilateral adrenal masses are consistent with metastasis as well.  CT of the chest is recommended to search for a primary  neoplasm and possible pathologic rib fracture.  Dilation of the common bile duct and bulkiness of the pancreas without a focal mass defined yet.  A 14 mm mass between the stomach and spleen may represent a third accessory spleen or a metastasis.  A gallbladder is not identified.    MRCP 1/8/17  Severe stenosis of the common bile duct in the head of the pancreas without a distinct mass seen.  The proximal common duct measures 1.75 cm.  Mild acute pancreatitis.  Bilateral soft tissue masses in the adrenal glands are noted.  A small 11 mm mass is seen between the spleen and the stomach in the left upper quadrant of uncertain etiology.    Ct Chest with contrast 1/9/17  1.  No primary malignant lesion is identified within the chest.    2. Moderate mixed groundglass and alveolar changes in both lungs, predominantly in the lung bases which may be secondary to atelectasis/hypoventilatory changes, pulmonary edema or less likely pneumonia.    3. Pattern of metastatic disease:  -Bilateral adrenal gland masses  -Multiple metastatic lesions involving the visualized axial and appendicular skeleton including nondisplaced pathologic fractures involving the right posterior 12th and right posterior eighth ribs  -Mild mediastinal lymphadenopathy, favored to be reactive but less likely metastatic in origin  -Multiple small soft tissue masses involving the subcutaneous fat of the chest and upper abdomen, concerning for soft tissue metastasis. One of these may be amenable to percutaneous biopsy.     4. Intrahepatic and extrahepatic bile that dilatation to the level of the distal common bile duct, concerning for malignant obstructive etiology in this setting. This has been evaluated on CT and MRI. There are also features of acute pancreatitis.    Ct Head W Wo contrast 1/9/17  1.  No acute intracranial abnormality or evidence for intracranial metastatic disease.  2. Low-density lesion involving the inner table of the calvarium near the  torcula is thought to most likely represent an arachnoid granulation.     ASSESSMENT/PLAN:     47 yo pmhx COPD, smoker presenting with chest pain, nausea, unintentional weight loss found to have obstructive jaundice with  lesions concerning for metastatic disease in spine, adrenal lesion between stomach and spleen with elevated CA 19-9 concerning for metastatic pancreatic carcinoma.    Plan:   -Follow up pathology from abdominal wall nodule  -Agree with EUS/ERCP for potential stent placement as well as tissue biopsy, patient will need improvement in T bili before any potential treatment.  -Highly suspicious for pancreatic cancer, if it is confirmed than would not offer any treatment  as an inpatient. Would need to follow up in clinic to discuss any potential clinical trials at that time.     Thank you for this consult. Attending attestation to follow.    Jarvis Callahan MD PGY-1    ATTENDING NOTE, ONCOLOGY CONSULT TEAM    As above; patient seen and examined, chart reviewed, radiologic studies reviewed with Dr. Wood.  She appears to have a disseminated malignancy of a yet to be determined primary.  She has no breast masses on examination.  There is a small subcutaneous nodule in the epigastric area that was biopsied at the outside facility.  There is no significant adenopathy  Labs reviewed.    RECOMMEND  Follow up on biopsy that was performed at the [facility] facility.  Check mammogram  Agree with attempt at biliary stenting and EUS..  If the abdominal mass biopsy is inconclusive, she will need another biopsy for tissue diagnosis.  Would ask IR to evaluate her for a possible adrenal biopsy.  Needs a bone scan and MRI of the T and L spine since she appears to have several vertebral bodies involved and she may be at risk for cord compression.  Consider transferring this patient to the Oncology Service.

## 2017-01-12 NOTE — ASSESSMENT & PLAN NOTE
- unknown primary  -  >6000, likely pancreatic  - f/u   - CT abd abdomen, pelvis, chest and head done in OSH-> sacrum/lumbar/thoracic osteolytic lesions seen, bulkiness of the pancreas without a focal mass defined yet, 14 mm mass between the stomach and spleen, other subcutaneous findings as well  - superficial skin nodule biopsy done in OSH  - f/u path results  - Heme/Onc consulted, f/u recs

## 2017-01-12 NOTE — ASSESSMENT & PLAN NOTE
-lipase 181 on 1/8  -pt has been transition to liquid however she complains of recurrence of pain  -ordered lipase, IVF, pain management and NPO

## 2017-01-12 NOTE — PLAN OF CARE
Problem: Patient Care Overview  Goal: Plan of Care Review  Outcome: Ongoing (interventions implemented as appropriate)  Pt has remained free from injury this shift. Afebrile. Dilaudid and morphine given PRN as ordered, mild to moderate relief noted. Magnesium replaced as ordered. Pt has remained NPO overnight for procedure today. O2 increased to 4L via nasal cannula to maintain SpO2 @ 94%. IVFs initiated overnight. Cardiac monitoring as ordered, tachycardic. Bed in lowest, locked position w/ side rails up x2. Call light within reach. Will continue to monitor.

## 2017-01-12 NOTE — NURSING TRANSFER
Nursing Transfer Note      1/12/2017     Transfer To: [id number] [facility]    Transfer via stretcher    Transfer with to O2    Transported by PCT    Medicines sent: yes    Chart send with patient: Yes    Patient reassessed at: 1415 1/12/2017    Upon arrival to floor: patient oriented to room, call bell in reach and bed in lowest position

## 2017-01-12 NOTE — SUBJECTIVE & OBJECTIVE
Interval History:     Patient with significant back pain overnight, required multiple PRN dilaudid and morphine injections. Will plan to optimize pain regimen with long acting meds. AES consulted, plan for EUS/ERCP today. Heme/Onc consulted.    Review of Systems   Constitutional: Positive for unexpected weight change. Negative for chills and fever.   HENT: Negative for rhinorrhea, sore throat and trouble swallowing.    Respiratory: Negative for cough and shortness of breath.    Cardiovascular: Negative for chest pain, palpitations and leg swelling.   Gastrointestinal: Positive for abdominal pain and constipation. Negative for diarrhea, nausea and vomiting.   Endocrine: Negative for polyuria.   Genitourinary: Negative for dysuria and frequency.   Musculoskeletal: Positive for back pain. Negative for arthralgias and myalgias.   Skin: Negative for rash and wound.   Neurological: Negative for weakness, light-headedness, numbness and headaches.   Psychiatric/Behavioral: Negative for confusion and hallucinations.     Objective:     Vital Signs (Most Recent):  Temp: 98 °F (36.7 °C) (01/12/17 1345)  Pulse: 99 (01/12/17 1415)  Resp: 19 (01/12/17 1415)  BP: (!) 141/85 (01/12/17 1415)  SpO2: (!) 94 % (01/12/17 1415) Vital Signs (24h Range):  Temp:  [98 °F (36.7 °C)-99.1 °F (37.3 °C)] 98 °F (36.7 °C)  Pulse:  [] 99  Resp:  [18-24] 19  SpO2:  [92 %-95 %] 94 %  BP: (117-141)/(68-92) 141/85     Weight: 76.3 kg (168 lb 3.2 oz)  Body mass index is 30.76 kg/(m^2).    Intake/Output Summary (Last 24 hours) at 01/12/17 1442  Last data filed at 01/12/17 1341   Gross per 24 hour   Intake             1745 ml   Output              400 ml   Net             1345 ml      Physical Exam   Constitutional: She appears well-developed and well-nourished.   HENT:   Head: Normocephalic.   Mouth/Throat: Oropharynx is clear and moist.   Eyes: EOM are normal. Pupils are equal, round, and reactive to light. No scleral icterus.   Neck: Normal range  of motion.   Cardiovascular: Normal rate, regular rhythm, normal heart sounds and intact distal pulses.  Exam reveals no gallop and no friction rub.    No murmur heard.  Pulmonary/Chest: Effort normal.   Coarse breath sounds bilaterally   Abdominal: Soft. Bowel sounds are normal. She exhibits distension. There is tenderness. There is no rebound and no guarding.   Skin: Skin is warm and dry. No erythema.   Hard, fixed, subcutaneous nodule ~1x1 cm located below left breast     Significant Labs:   A1C:   Recent Labs  Lab 01/09/17  0545   HGBA1C 6.4*     ABGs: No results for input(s): PH, PCO2, HCO3, POCSATURATED, BE in the last 48 hours.  Bilirubin:   Recent Labs  Lab 01/09/17  0545 01/10/17  0700 01/11/17  0533 01/11/17 1847 01/12/17  0557   BILITOT 4.3* 5.3* 6.0* 6.3* 6.7*     Blood Culture: No results for input(s): LABBLOO in the last 48 hours.  BMP:   Recent Labs  Lab 01/12/17  0557   *      K 4.0   CL 99   CO2 29   BUN 5*   CREATININE 0.5   CALCIUM 10.3   MG 1.3*     CBC:   Recent Labs  Lab 01/11/17  1847 01/12/17  0557   WBC 7.76 9.16   HGB 10.4* 10.6*   HCT 30.5* 31.9*    365*     CMP:   Recent Labs  Lab 01/11/17  0533 01/11/17  1847 01/12/17  0557   * 134* 137   K 3.8 3.9 4.0   CL 95 97 99   CO2 30* 31* 29   * 116* 116*   BUN 4* 5* 5*   CREATININE 0.5 0.5 0.5   CALCIUM 10.3 10.0 10.3   PROT 5.8* 6.1 6.3   ALBUMIN 1.9* 1.9* 1.9*   BILITOT 6.0* 6.3* 6.7*   ALKPHOS 1581* 1512* 1503*   AST 87* 84* 87*   * 161* 150*   ANIONGAP 10 6* 9   EGFRNONAA >60 >60.0 >60.0     Cardiac Markers: No results for input(s): CKMB, TROPONINT, MYOGLOBIN in the last 48 hours.  Coagulation:   Recent Labs  Lab 01/12/17  0557   INR 1.1     Lactic Acid: No results for input(s): LACTATE in the last 48 hours.  Lipase:   Recent Labs  Lab 01/11/17  1847   LIPASE 73*     All pertinent labs within the past 24 hours have been reviewed.    Significant Imaging: I have reviewed all pertinent imaging  results/findings within the past 24 hours.

## 2017-01-12 NOTE — DISCHARGE INSTRUCTIONS

## 2017-01-12 NOTE — MEDICAL/APP STUDENT
"Kat Corley is a 46 y.o. female patient.    Active Hospital Problems    Diagnosis  POA    Pancreatitis [K85.90]  Yes    Liver function abnormality [K76.89]  Unknown    Prediabetes [R73.03]  Yes    Cigarette smoker [F17.210]  Yes    Multiple lesions of metastatic malignancy [C79.9]  Yes      Resolved Hospital Problems    Diagnosis Date Resolved POA   No resolved problems to display.     Temp: 98 °F (36.7 °C) (01/12/17 1345)  Pulse: 103 (01/12/17 1345)  Resp: 19 (01/12/17 1345)  BP: 129/87 (01/12/17 1345)  SpO2: (!) 92 % (01/12/17 1345)  Weight: 76.3 kg (168 lb 3.2 oz) (01/11/17 1715)  Height: 5' 2" (157.5 cm) (01/11/17 1715)    Subjective  Objective  Assessment & Plan    Rashawn Jenkins Oh  1/12/2017  Progress Note    Admit Date: 1/11/2017   LOS: 1 day     SUBJECTIVE:     Follow-up For:   Sudden onset epigastric pain that radiates to the back on 1/4/2017. Pain is sharp, achy, 10/10, constant throughout the day, and associated with vomiting. Epigastric pain has gotten better but pain in the back is worsening. Patient was seen in [facility] from 1/7/2017 to 1/11/2017 and was transferred on 1/11/2017.    Scheduled Meds:   gabapentin  300 mg Oral TID    morphine  15 mg Oral Q12H    nicotine  1 patch Transdermal Daily    polyethylene glycol  17 g Oral Daily    [START ON 1/13/2017] vitamin D  1,000 Units Oral Daily     Continuous Infusions:   sodium chloride 0.9% 150 mL/hr at 01/12/17 0740     PRN Meds:albuterol-ipratropium 2.5mg-0.5mg/3mL, dextrose 50%, dextrose 50%, glucagon (human recombinant), glucose, glucose, HYDROmorphone, insulin aspart, ondansetron, promethazine (PHENERGAN) IVPB    Review of patient's allergies indicates:  No Known Allergies    Review of Systems  Weight loss (20 lbs in the last 4 months), no bowel movement since the onset of pain.    OBJECTIVE:     Vital Signs (Most Recent)  Temp: 98 °F (36.7 °C) (01/12/17 1345)  Pulse: 103 (01/12/17 1345)  Resp: 19 (01/12/17 1345)  BP: 129/87 " (01/12/17 1345)  SpO2: (!) 92 % (01/12/17 1345)    Vital Signs Range (Last 24H):  Temp:  [98 °F (36.7 °C)-99.1 °F (37.3 °C)]   Pulse:  [102-110]   Resp:  [18-24]   BP: (117-129)/(68-87)   SpO2:  [92 %-95 %]     I & O (Last 24H):  Intake/Output Summary (Last 24 hours) at 01/12/17 1355  Last data filed at 01/12/17 1341   Gross per 24 hour   Intake             1745 ml   Output                0 ml   Net             1745 ml     Physical Exam:  Epigastric and LUQ tenderness on palpation, distended abdomen, decreased bowel sounds, crackles at lung bases.    Laboratory:  Elevated ALP, total bilirubin, ALT, AST, LDH, glucose, CA 19-9.  Low hemoglobin, RBC, albumin.    Diagnostic Results:  Metastatic pancreatic cancer.    ASSESSMENT/PLAN:     Plan: Patient has been referred and transferred to the medical oncology team on 1/12/2017.

## 2017-01-12 NOTE — CONSULTS
Advanced Endoscopy Service Consult    Reason for Consult:    HPI:  This is a 46 year old female with no significant past medical history who presents as transfer for advanced endoscopy evaluation of obstructive jaundice. She was admitted there 01/09/2016 with a one week history of nausea, vomiting and abdominal pain. CT scan incidentally showed biliary ductal dilation with concern for metastatic lesions involving the bone, adrenals and mention of a 14mm lesion between stomach and spleen.    She underwent ultrasound-guided biopsy of palpable anterior abdominal wall nodule on 01/10/2016 and path is still pending at this time. She reports a 20lb weight loss over 4 months. She has never had a colonoscopy. Denies blood in stool. Last pap and mammogram were 4 years ago.     Family history includes father with brain cancer, mother with lung cancer and paternal grandmother with breast cancer. She lives in [place] with boyfriend and 21 yo daughter. She is a  at "[employer]. She smokes daily. No heavy alcohol history - just social. No history of chronic liver disease. She had a cholecystectomy remotely.     Constitutional: no fever or chills   Eyes: no visual changes   ENT: no sore throat or dysphagia  Respiratory: no cough or shortness of breath   Cardiovascular: no chest pain or palpitations   Gastrointestinal: as per HPI  Hematologic/Lymphatic: no easy bruising or lymphadenopathy   Musculoskeletal: no arthralgias or myalgias   Neurological: no seizures, tremors or change in mental status  Behavioral/Psych: no auditory or visual hallucinations    Past Medical History   Diagnosis Date    Neuropathy        History reviewed. No pertinent past surgical history.    History reviewed. No pertinent family history.    Review of patient's allergies indicates:  No Known Allergies    Social History     Social History    Marital status:      Spouse name: N/A    Number of children: N/A    Years of education: N/A  "    Social History Main Topics    Smoking status: Current Every Day Smoker     Packs/day: 0.50     Years: 15.00     Types: Cigarettes    Smokeless tobacco: None    Alcohol use No    Drug use: None    Sexual activity: Yes     Other Topics Concern    None     Social History Narrative        gabapentin  300 mg Oral TID    magnesium sulfate IVPB  2 g Intravenous Once    morphine  15 mg Oral Q12H    nicotine  1 patch Transdermal Daily    polyethylene glycol  17 g Oral Daily       Visit Vitals    /68 (BP Location: Left arm, Patient Position: Lying, BP Method: Automatic)    Pulse 104    Temp 98.5 °F (36.9 °C) (Oral)    Resp (!) 24    Ht 5' 2" (1.575 m)    Wt 76.3 kg (168 lb 3.2 oz)    LMP 12/21/2016 (Approximate)    SpO2 (!) 93%    Breastfeeding No    BMI 30.76 kg/m2     General appearance: alert, appears stated age and cooperative.  Eyes: negative findings: conjunctivae normal and scleral icterus.   Throat: lips, mucosa, and tongue normal.  Lungs: clear to auscultation bilaterally.  Heart: S1, S2 normal.  Abdomen: soft, diffuse tenderness bowel sounds normal; no masses, liver palpated 1-2 cm below costal region.  Skin: no rashes to visible areas, jaundice appreciated.   Lymph nodes: No cervical or supraclavicular adenopathy appreciated  Neurologic: Mental status: alert, oriented, thought content appropriate.  Extremities: No lower extremity edema, No muscle wasting appreciated.  Breast: 1cm hard, calcified lesion at left, medial breast      Laboratory:    Recent Labs  Lab 01/11/17  0533 01/11/17  1847 01/12/17  0557   * 134* 137   K 3.8 3.9 4.0   CL 95 97 99   CO2 30* 31* 29   BUN 4* 5* 5*   CREATININE 0.5 0.5 0.5   CALCIUM 10.3 10.0 10.3   PROT 5.8* 6.1 6.3   BILITOT 6.0* 6.3* 6.7*   ALKPHOS 1581* 1512* 1503*   * 161* 150*   AST 87* 84* 87*         Recent Labs  Lab 01/09/17  0545 01/11/17  1847 01/12/17  0557   WBC 11.60 7.76 9.16   HGB 12.0 10.4* 10.6*   HCT 35.7* 30.5* 31.9* "    335 365*         Recent Labs  Lab 01/08/17  1838 01/11/17  1847 01/12/17  0557   INR 1.1 1.1 1.1       Assessment:  This is a 46 year old female with no significant past medical history who presents as transfer for advanced endoscopy evaluation of obstructive jaundice. She was admitted there 01/09/2016 with a one week history of nausea, vomiting and abdominal pain. CT scan incidentally showed biliary ductal dilation with concern for metastatic lesions involving the bone, adrenals and mention of a 14mm lesion between stomach and spleen.    1. Obstructive jaundice no obvious mass on imaging to explain biliary obstruction.     Plan:  1. Proceed with EUS FNA and ERCP today.  2. Keep Npo.  3. Hold prophylactic anticoagulation.     Leah Gonzalez MD PGY-6  Gastroenterology Fellow  Pager# [phone]

## 2017-01-12 NOTE — SUBJECTIVE & OBJECTIVE
Past Medical History   Diagnosis Date    Neuropathy        History reviewed. No pertinent past surgical history.    Review of patient's allergies indicates:  No Known Allergies    Current Facility-Administered Medications on File Prior to Encounter   Medication    [DISCONTINUED] 0.9%  NaCl infusion    [DISCONTINUED] albuterol-ipratropium 2.5mg-0.5mg/3mL nebulizer solution 3 mL    [DISCONTINUED] alprazolam tablet 1 mg    [DISCONTINUED] docusate sodium capsule 100 mg    [DISCONTINUED] enoxaparin injection 40 mg    [DISCONTINUED] gabapentin capsule 300 mg    [DISCONTINUED] gabapentin capsule 300 mg    [DISCONTINUED] hydromorphone (PF) injection 1 mg    [DISCONTINUED] morphine injection 4 mg    [DISCONTINUED] ondansetron injection 4 mg    [DISCONTINUED] pantoprazole injection 40 mg    [DISCONTINUED] piperacillin-tazobactam 4.5 g in dextrose 5 % 100 mL IVPB (ready to mix system)    [DISCONTINUED] promethazine (PHENERGAN) 12.5 mg in dextrose 5 % 50 mL IVPB     Current Outpatient Prescriptions on File Prior to Encounter   Medication Sig    gabapentin (NEURONTIN) 300 MG capsule Take 300 mg by mouth 3 (three) times daily.    naproxen sodium (ANAPROX) 550 MG tablet Take 550 mg by mouth every 12 (twelve) hours.     Family History     None        Social History Main Topics    Smoking status: Current Every Day Smoker     Packs/day: 0.50     Years: 15.00     Types: Cigarettes    Smokeless tobacco: Not on file    Alcohol use No    Drug use: Not on file    Sexual activity: Yes     Review of Systems   Constitutional: Negative for chills and fever.   HENT: Negative for rhinorrhea, sore throat and trouble swallowing.    Respiratory: Negative for cough and shortness of breath.    Cardiovascular: Negative for chest pain, palpitations and leg swelling.   Gastrointestinal: Positive for abdominal pain and constipation. Negative for diarrhea, nausea and vomiting.   Endocrine: Negative for polyuria.   Genitourinary:  Negative for dysuria and frequency.   Musculoskeletal: Negative for arthralgias and myalgias.   Skin: Negative for rash and wound.   Neurological: Negative for weakness, light-headedness, numbness and headaches.   Psychiatric/Behavioral: Negative for confusion and hallucinations.     Objective:     Vital Signs (Most Recent):  Temp: 98.3 °F (36.8 °C) (01/11/17 1715)  Pulse: 110 (01/11/17 1715)  Resp: (!) 22 (01/11/17 1715)  BP: 126/81 (01/11/17 1715)  SpO2: (!) 93 % (01/11/17 1715) Vital Signs (24h Range):  Temp:  [96.6 °F (35.9 °C)-98.5 °F (36.9 °C)] 98.3 °F (36.8 °C)  Pulse:  [102-118] 110  Resp:  [16-22] 22  BP: (110-126)/(69-86) 126/81     Weight: 76.3 kg (168 lb 3.2 oz)  Body mass index is 30.76 kg/(m^2).    Physical Exam   Constitutional: She is oriented to person, place, and time. She appears well-developed and well-nourished. No distress.   HENT:   Head: Normocephalic and atraumatic.   Mouth/Throat: No oropharyngeal exudate.   Eyes: Right eye exhibits no discharge. Left eye exhibits no discharge. No scleral icterus.   Neck: Normal range of motion. Neck supple. No tracheal deviation present.   Cardiovascular: Normal rate, regular rhythm, normal heart sounds and intact distal pulses.  Exam reveals no gallop and no friction rub.    No murmur heard.  Pulmonary/Chest: Effort normal and breath sounds normal. No respiratory distress. She has no wheezes. She has no rales. She exhibits no tenderness.   Abdominal: Soft. Bowel sounds are normal. She exhibits no distension. There is no tenderness. There is no rebound and no guarding.   Musculoskeletal: She exhibits no edema, tenderness or deformity.   Neurological: She is alert and oriented to person, place, and time.   Skin: Skin is warm and dry. No rash noted. She is not diaphoretic. No erythema.   Psychiatric: She has a normal mood and affect. Her behavior is normal. Judgment and thought content normal.        Significant Labs:   CBC: No results for input(s): WBC,  HGB, HCT, PLT in the last 48 hours.  CMP:   Recent Labs  Lab 01/10/17  0700 01/11/17  0533   * 135*   K 3.6 3.8   CL 97 95   CO2 26 30*    114*   BUN 5* 4*   CREATININE 0.5 0.5   CALCIUM 10.1 10.3   PROT 6.1 5.8*   ALBUMIN 2.0* 1.9*   BILITOT 5.3* 6.0*   ALKPHOS 1690* 1581*   AST 97* 87*   * 176*   ANIONGAP 12 10   EGFRNONAA >60 >60     Coagulation: No results for input(s): INR, APTT in the last 48 hours.    Invalid input(s): PT  Lactic Acid: No results for input(s): LACTATE in the last 48 hours.  Lipase: No results for input(s): LIPASE in the last 48 hours.  Lipid Panel: No results for input(s): CHOL, HDL, LDLCALC, TRIG, CHOLHDL in the last 48 hours.    Significant Imaging: -
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
## 2017-01-12 NOTE — ASSESSMENT & PLAN NOTE
- lipase 181 on 1/8  - pt has been transition to liquid however she complains of recurrence of pain  - ordered lipase, IVF, pain management, on liquid diet  - cont to monitor pain, started her on long acting morphine at 15mg BID with dilaudid injections for breakthrough

## 2017-01-12 NOTE — PLAN OF CARE
Problem: Patient Care Overview  Goal: Plan of Care Review  Recommendations     Recommendation/Intervention:   1. When medically able and as tolerated, consider advancing diet to low-fat/high protein diet per pancreatitis diagnosis.   2. As tolerated, encourage optimal po intake >75% meals of current diet.   3. If unable to tolerate diet, consult RD for further MNT.RD to monitor and follow up      Goals: Pt will advance diet within 48 hours as tolerated from CLD  Nutrition Goal Status: new     Continuum of Care Plan     Referral to Outpatient Services: other (see comments) (Nutrition D/C Planning: Will assess daily)
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
## 2017-01-13 NOTE — PLAN OF CARE
Problem: Occupational Therapy Goal  Goal: Occupational Therapy Goal  Eval initiated and completed. Pt demonstrating no OT needs performing self care  tasks and functional mobility with no need of OT intervention. D/C Pt from IPOT  secondary to not demonstrating OT needs.  Continue OT POC.    Comments:   Julio Cesar Marks OTR/L  1/13/2017

## 2017-01-13 NOTE — PLAN OF CARE
[facility]    HOME HEALTH ORDERS  FACE TO FACE ENCOUNTER    Patient Name: Kat Corley  YOB: 1970    PCP: NAMITA Guerin   PCP Address: [address] / [place] [address]  PCP Phone Number: [phone]  PCP Fax: [phone]    Encounter Date: 01/13/2017    Admit to Home Health    Diagnoses:  Active Hospital Problems    Diagnosis  POA    *Multiple lesions of metastatic malignancy [C79.9]  Yes    Obstructive jaundice [K83.8]  Yes    Pancreatitis [K85.90]  Yes    Metastatic cancer to bone [C79.51]  Yes      Resolved Hospital Problems    Diagnosis Date Resolved POA   No resolved problems to display.       No future appointments.        I have seen and examined this patient face to face today. My clinical findings that support the need for the home health skilled services and home bound status are the following:  Weakness/numbness causing balance and gait disturbance due to Weakness/Debility, Anemia and Malignancy/Cancer making it taxing to leave home.    Allergies:Review of patient's allergies indicates:  No Known Allergies    Diet: regular diet    Activities: activity as tolerated    Nursing:   SN to complete comprehensive assessment including routine vital signs. Instruct on disease process and s/s of complications to report to MD. Review/verify medication list sent home with the patient at time of discharge  and instruct patient/caregiver as needed. Frequency may be adjusted depending on start of care date.    Notify MD if SBP > 160 or < 90; DBP > 90 or < 50; HR > 120 or < 50; Temp > 101      CONSULTS:    Physical Therapy to evaluate and treat. Evaluate for home safety and equipment needs; Establish/upgrade home exercise program. Perform / instruct on therapeutic exercises, gait training, transfer training, and Range of Motion.  Occupational Therapy to evaluate and treat. Evaluate home environment for safety and equipment needs. Perform/Instruct on transfers, ADL  training, ROM, and therapeutic exercises.    MISCELLANEOUS CARE:  Nasal cannula O2 at 2 LPM, continues     WOUND CARE ORDERS  n/a      Medications: Review discharge medications with patient and family and provide education.      Current Discharge Medication List      START taking these medications    Details   morphine (MS CONTIN) 30 MG 12 hr tablet Take 1 tablet (30 mg total) by mouth every 12 (twelve) hours.  Qty: 60 tablet, Refills: 0      nicotine (NICODERM CQ) 14 mg/24 hr Place 1 patch onto the skin once daily.  Refills: 0      oxycodone (ROXICODONE) 10 mg Tab immediate release tablet Take 1 tablet (10 mg total) by mouth every 4 (four) hours as needed for Pain.  Qty: 60 tablet, Refills: 0      polyethylene glycol (GLYCOLAX) 17 gram PwPk Take 17 g by mouth once daily.  Qty: 30 each, Refills: 2      senna-docusate 8.6-50 mg (PERICOLACE) 8.6-50 mg per tablet Take 1 tablet by mouth once daily.         CONTINUE these medications which have NOT CHANGED    Details   cyanocobalamin (VITAMIN B-12) 100 MCG tablet Take 100 mcg by mouth once daily.      gabapentin (NEURONTIN) 300 MG capsule Take 300 mg by mouth 3 (three) times daily.      multivitamin (ONE DAILY MULTIVITAMIN) per tablet Take 1 tablet by mouth once daily.      pyridoxine, vitamin B6, (VITAMIN B-6) 100 MG Tab Take 100 mg by mouth once daily.             I certify that this patient is confined to her home and needs physical therapy and occupational therapy.

## 2017-01-13 NOTE — PLAN OF CARE
01/13/2017      Kat Corley  [address] [place] [address]          Hospital Medicine Dept.  [facility] [address] [place] [id number] [phone] after hours  [phone] fax Kat Corley has been hospitalized at the [facility] since 1/11/2017.  Please excuse the patient from duties.  Patient may return on 1/23/2017.  No restrictions.     Please contact me if you have any questions.                  __________________________  Nathaniel Rodriguez MD  01/13/2017

## 2017-01-13 NOTE — PROGRESS NOTES
Oxygen orders have been received by [facility] they will deliver to the bedside. Orders for home therapy in as well. Notified team of recommendations was for outpatient therapy. New orders to be put in to refer patient for outpatient physical and occupational therapy.

## 2017-01-13 NOTE — DISCHARGE SUMMARY
DISCHARGE SUMMARY  Hospital Medicine    Team: Networked reference to record PCT     Patient Name: Kat Corley  YOB: 1970    Admit Date: 1/11/2017    Discharge Date: 01/13/2017    Discharge Attending Physician: BETO LOPEZ    Resident on Service: Nathaniel Rodriguez    Chief Complaint: CP and epigastric discomfort     Princilpal Diagnoses:  Active Hospital Problems    Diagnosis  POA    *Multiple lesions of metastatic malignancy [C79.9]  Yes    Obstructive jaundice [K83.8]  Yes    Pancreatitis [K85.90]  Yes    Metastatic cancer to bone [C79.51]  Yes      Resolved Hospital Problems    Diagnosis Date Resolved POA   No resolved problems to display.       Discharged Condition: Admit problems have resolved   HOSPITAL COURSE:      Initial Presentation:    Mrs. Corley presents for evaluation of chest pain. She describes the pain as a burning sensation to her LEFT chest which is non-radiating and intermittent. She denies shortness of breath. She denies known exacerbating or alleviating factors. She reports nausea and aversion to food this week. She reports epigastric discomfort. She reports fever and chills. She denies trauma. She denies alcohol consumption. She denies Acetaminophen use. She denies yellowing of her skin. She denies recent travel or ingestion of raw or undercooked food. She denies diarrhea. She reports 30 pounds weight loss in the past 4 months. She is unsure why she has lost the weight.     She was evaluated in the ED. She was found to have elevated lipase, liver enzymes and bilirubin. A CT of the abdomen revealed pancreatitis and dilated CBD. She has history of cholecystectomy when she was 21.     Course of Principle Problem for Admission:    Pt admitted for pancreatitis. Received IVFs, anti emetics, narcotics. Kept NPO. CT abd/ pelv significant for extensive metastatic lesions in vertebral spine and abdomen. GI and oncology consulted. CT brain, CT chest does not reveal any primary  source of malignancy. CEA and  significantly elevated (5400) and (04217), respectivly. Ultrasound guided biopsy of abd subcutaneous fat pad. Results pending. Pain was well controlled with IV narcotics. She was transferred to [facility] for EUS with FNA. EUS with FNA biopsy of lymph node and adrenal gland, preliminary report is +ve for malignancy.ERCP with a metal stent performed as well. T.Bili trending down significantly after stent placement. Pt tolerating diet well. During course of admission Pt has been on NC 2 LPM, she did not pass 6 min walk test, possibly an underline COPD disease given hx of smoking. She was discharged on home O2 and oncologoy clinic f/u next week.     Other Medical Problems Addressed in the Hospital:    Multiple lesions of metastatic malignancy  - unknown primary  - No prior Colonoscopy, No hx of abnormal pap smears, Last mammogram and pap smear were 4 years ago which was normal.  - Family history significant for father with brain tumor at 44, mother lung cancer both have passed away within the past year and paternal grandmother had breast cancer.  - : >6000, likely pancreatic  - : >5400  - CT abd abdomen, pelvis, chest and head done in OSH-> sacrum/lumbar/thoracic osteolytic lesions seen, bulkiness of the pancreas without a focal mass defined yet, 14 mm mass between the stomach and spleen, other subcutaneous findings as well  - superficial skin nodule biopsy done in OSH  - F/u path results  - Mammogram and NM bone scan ordered.       Cigarette smoker  nicotine patch       Pancreatitis  - lipase 181 on 1/8  - Tolerating diet  - On  cc/hour.   - On morphine 12 hour 30 mg BID. Roxicet Q4hrs, and Hydromorphone 1 mg Q4hrs.   - T.Bili and LFTs trending down      Liver function abnormality  - 2/2 obstruction likely malignancy  - CBD noted on imaging with intrahepatic and extrahepatic dilations  - T bili has been up trending, now 6  - Alk phos 1500's  -  AST and ALT down trending  - S/P EUS and ERCP with metal stent placementt.    CONSULTS: AES    Last CBC/BMP/HgbA1c (if applicable):  Recent Results (from the past 336 hour(s))   CBC with Automated Differential    Collection Time: 01/13/17  4:57 AM   Result Value Ref Range    WBC 8.13 3.90 - 12.70 K/uL    Hemoglobin 9.2 (L) 12.0 - 16.0 g/dL    Hematocrit 27.3 (L) 37.0 - 48.5 %    Platelets 364 (H) 150 - 350 K/uL   CBC with Automated Differential    Collection Time: 01/12/17  5:57 AM   Result Value Ref Range    WBC 9.16 3.90 - 12.70 K/uL    Hemoglobin 10.6 (L) 12.0 - 16.0 g/dL    Hematocrit 31.9 (L) 37.0 - 48.5 %    Platelets 365 (H) 150 - 350 K/uL   CBC auto differential    Collection Time: 01/11/17  6:47 PM   Result Value Ref Range    WBC 7.76 3.90 - 12.70 K/uL    Hemoglobin 10.4 (L) 12.0 - 16.0 g/dL    Hematocrit 30.5 (L) 37.0 - 48.5 %    Platelets 335 150 - 350 K/uL     No results found for this or any previous visit (from the past 336 hour(s)).  Lab Results   Component Value Date    HGBA1C 6.4 (H) 01/09/2017       Other Pertinent Lab Findings:    CBC:      Recent Labs  Lab 01/11/17 1847 01/12/17  0557 01/13/17  0457   WBC 7.76 9.16 8.13   RBC 3.49* 3.55* 3.09*   HGB 10.4* 10.6* 9.2*   HCT 30.5* 31.9* 27.3*    365* 364*   MCV 87 90 88   MCH 29.8 29.9 29.8   MCHC 34.1 33.2 33.7         BMP:   Recent Labs  Lab 01/11/17 1847 01/12/17  0557 01/13/17  0457   * 116* 93   * 137 138   K 3.9 4.0 3.6   CL 97 99 103   CO2 31* 29 27   BUN 5* 5* 4*   CREATININE 0.5 0.5 0.4*   CALCIUM 10.0 10.3 9.5   MG 1.2* 1.3* 1.1*   PHOS 4.0 4.0 4.2         LFT:     Recent Labs  Lab 01/08/17  1838   01/11/17  1847 01/12/17  0557 01/13/17  0457   ALBUMIN 2.8* < > 1.9* 1.9* 1.7*   PROT 7.6 < > 6.1 6.3 5.6*   ALKPHOS 1927* < > 1512* 1503* 1105*   * < > 161* 150* 101*   * < > 84* 87* 50*   BILITOT 4.2* < > 6.3* 6.7* 2.9*   INR 1.1 --  1.1 1.1 --          Pertinent/Significant Diagnostic Studies:      EUS  1/13/17:                         - There was dilation in the common bile duct and in the intrahepatic bile duct(s) which measured up to 14 mm.                        - There was a suggestion of a stricture in the lower third of the main bile duct.                        - Pancreatic parenchymal abnormalities consisting of hyperechoic strands and lobularity were noted in the entire pancreas.                        - A few abnormal lymph nodes were visualized in the fernando hepatis region. Fine needle aspiration performed.                        - A mass measuring 38 mm by 19 mm was identified endosonographically in the left adrenal gland. Fine needle aspiration performed.    ERCP 1/14/17:  Impression:                          - The major papilla appeared normal.                        - A severe biliary stricture was found.                        - A sphincterotomy was performed.                        - One covered metal stent was placed into the common bile duct.    Recommendation:                           - [facility] patient to [facility] for [facility] care.                        - Await EUS-FNA results.                        - Recommend oncology consultation.    Special Treatments/Procedures: EUS and ERCP    Disposition:  Home with home health    Future Scheduled Appointments:  No future appointments.    Follow-up Plans from This Hospitalization:  -- F/U on pathology reports  -- F/U on T.Bili and LFTs levels  -- PT will need colonoscopy, Mammogram and NM bone scan in the out patient settings.   -- ERCP with stent exchange after 3 months     Discharge Medication List:     Kat Corley   Home Medication Instructions ALAYNA:[id number]    Printed on:01/13/17 1206   Medication Information                      cyanocobalamin (VITAMIN B-12) 100 MCG tablet  Take 100 mcg by mouth once daily.             gabapentin (NEURONTIN) 300 MG capsule  Take 300 mg by mouth 3 (three) times daily.             morphine (MS CONTIN) 30 MG  12 hr tablet  Take 1 tablet (30 mg total) by mouth every 12 (twelve) hours.             multivitamin (ONE DAILY MULTIVITAMIN) per tablet  Take 1 tablet by mouth once daily.             nicotine (NICODERM CQ) 14 mg/24 hr  Place 1 patch onto the skin once daily.             oxycodone (ROXICODONE) 10 mg Tab immediate release tablet  Take 1 tablet (10 mg total) by mouth every 4 (four) hours as needed for Pain.             polyethylene glycol (GLYCOLAX) 17 gram PwPk  Take 17 g by mouth once daily.             pyridoxine, vitamin B6, (VITAMIN B-6) 100 MG Tab  Take 100 mg by mouth once daily.             senna-docusate 8.6-50 mg (PERICOLACE) 8.6-50 mg per tablet  Take 1 tablet by mouth once daily.                 Patient Instructions:  No discharge procedures on file.    At the time of discharge patient was told to take all medications as prescribed, to keep all followup appointments, and to call their primary care physician or return to the emergency room if they have any worsening or concerning symptoms.    Signing Physician:  Nathaniel Rodriguez MD

## 2017-01-13 NOTE — PLAN OF CARE
Problem: Patient Care Overview  Goal: Plan of Care Review  Outcome: Ongoing (interventions implemented as appropriate)  Pt. with nonskid footwear on with bed in lowest position and locked with bed rails up x2. Pt. instructed to call prior to getting OOB. Pt. with call light within reach and verbalized understanding. Pt. With bone scan complete this today. Pt. received magnesium replacement. Pt. with c/o abdominal pain and back pain with MS contin and oxycodone being given. Pt. To be discharged this evening with home oxygen to be delivered. Will continue to monitor pt.

## 2017-01-13 NOTE — PROGRESS NOTES
01/13/17 1110 01/13/17 1116   Vital Signs   SpO2 (!) 86 % (!) 79 %   O2 Device (Oxygen Therapy) room air  (at rest) room air  (with ambulation)     Dr. Boland notified of current results from walking O2 test.

## 2017-01-13 NOTE — PT/OT/SLP PROGRESS
Occupational Therapy  Treatment    Kat Corley   MRN: [id number]   Admitting Diagnosis: Multiple lesions of metastatic malignancy    OT Date of Treatment: 17   OT Start Time: 956  OT Stop Time: 101  OT Total Time (min): 15 min    Billable Minutes:  Evaluation 15 minutes    General Precautions: Standard, fall  Orthopedic Precautions: N/A  Braces: N/A         Subjective:  Communicated with nurse prior to session.  Pt agreeable to skilled OT services.    Pain Ratin/10  Pain Rating Post-Intervention: 0/10    Objective:  Patient found with: peripheral IV   Pt received supine in bed.     Functional Mobility:  Bed Mobility:  Rolling/Turning to Left: Supervision  Scooting/Bridging: Supervision  Supine to Sit: Supervision    Transfers:   Sit <> Stand Assistance: Stand By Assistance  Sit <> Stand Assistive Device: No Assistive Device  Bed <> Chair Technique: Stand Pivot  Bed <> Chair Transfer Assistance: Stand By Assistance  Bed <> Chair Assistive Device: No Assistive Device  Toilet Transfer Technique: Stand Pivot  Toilet Transfer Assistance: Stand By Assistance  Toilet Transfer Assistive Device: No Assistive Device    Functional Ambulation: Pt ambulated 30 ft at sba w/o AD. Pt ambulated and performed toileting and grooming  off of O2 and did not verbalize any negative symptoms.    Activities of Daily Living:  LE Dressing Level of Assistance: Supervision (donned socks)  Grooming Position: Standing at sink  Grooming Level of Assistance: Supervision  Toileting Where Assessed: Toilet  Toileting Level of Assistance: Supervision      Balance:   Static Sit: GOOD-: Takes MODERATE challenges from all directions but inconsistently  Dynamic Sit: GOOD-: Maintains balance through MODERATE excursions of active trunk movement,     Static Stand: GOOD-: Takes MODERATE challenges from all directions inconsistently  Dynamic stand: GOOD-: Needs SUPERVISION only during gait and able to self right with moderate     Therapeutic  Activities and Exercises:  Pt educated on POC    AM-PAC 6 CLICK ADL   How much help from another person does this patient currently need?   1 = Unable, Total/Dependent Assistance  2 = A lot, Maximum/Moderate Assistance  3 = A little, Minimum/Contact Guard/Supervision  4 = None, Modified [place]/Independent    Putting on and taking off regular lower body clothing? : 4  Bathing (including washing, rinsing, drying)?: 3  Toileting, which includes using toilet, bedpan, or urinal? : 4  Putting on and taking off regular upper body clothing?: 4  Taking care of personal grooming such as brushing teeth?: 4  Eating meals?: 4  Total Score: 23     AM-PAC Raw Score CMS G-Code Modifier Level of Impairment Assistance   6 % Total / Unable   7 - 9 CM 80 - 100% Maximal Assist   10 - 14 CL 60 - 80% Moderate Assist   15 - 19 CK 40 - 60% Moderate Assist   20 - 22 CJ 20 - 40% Minimal Assist   23 CI 1-20% SBA / CGA   24 CH 0% Independent/ Mod I     Patient left up in chair with call button in reach    ASSESSMENT:  Kat Corley is a 46 y.o. female with a medical diagnosis of Multiple lesions of metastatic malignancy and presents with impairments listed below. D/C from acute OT services d/t pt not demonstrating need for acute OT services.    Rehab identified problem list/impairments:      Rehab potential is good.    Activity tolerance: Good    Discharge recommendations: Discharge Facility/Level Of Care Needs: out-patient therapy      Barriers to discharge:      Equipment recommendations: shower chair     GOALS:   Occupational Therapy Goals        Problem: Occupational Therapy Goal    Goal Priority Disciplines Outcome Interventions   Occupational Therapy Goal     OT, PT/OT     Description:  Eval initiated and completed. Pt demonstrating no OT needs performing self care  tasks and functional mobility with no need of OT intervention. D/C Pt from IPOT  secondary to not demonstrating OT needs.                Plan:  Patient to be  seen  (D/C acute OT services) to address the above listed problems via    Plan of Care expires:    Plan of Care reviewed with: patient    Julio Cesar Marks, OT  01/13/2017

## 2017-01-13 NOTE — PT/OT/SLP PROGRESS
Physical Therapy      Kat Corley  MRN: [id number]    Patient not seen today secondary to away to nuclear medicine on attempt. Unable to return  . Will follow-up at next available date.    Nuvia Aggarwal, PT   1/13/2017

## 2017-01-13 NOTE — PLAN OF CARE
"CM to bedside - pt provided assessment info. Pt independent w/ no DME in place, lives w/ S/O, Black and adult children. Pt will likely d/c home w/ no needs. Pt sitting up in bed tolerating a regular diet after her EUS today.     CM provided patient anticipated PAT which will be update by nursing staff. Patient was not provided a Blue "My Health Packet" for d/c planning and health tool - dept out. Patient verbalized understanding.    Future Appointments  Date Time Provider Department Center   1/13/2017 10:45 AM [facility] NM2 INFINIA 400LB LIMIT [facility] NUCLEAR [facility]        01/13/17 0911   Discharge Assessment   Assessment Type Discharge Planning Assessment   Confirmed/corrected address and phone number on facesheet? Yes   Assessment information obtained from? Patient   Expected Length of Stay (days) 5   Communicated expected length of stay with patient/caregiver yes   Prior to hospitilization cognitive status: Alert/Oriented   Prior to hospitalization functional status: Infant/Toddler/Child Appropriate   Current cognitive status: Alert/Oriented   Current Functional Status: Independent   Arrived From acute care hospital  (transfer from [facility])   Lives With significant other;child(konrad), adult   Able to Return to Prior Arrangements yes   Is patient able to care for self after discharge? Yes   Does the patient have family/friends to help with healtcare needs after discharge? yes   How many people do you have in your home that can help with your care after discharge? 1   Who are your caregiver(s) and their phone number(s)? S/O - Black [phone]   Patient's perception of discharge disposition home or selfcare   Readmission Within The Last 30 Days current reason for admission unrelated to previous admission   Patient currently being followed by outpatient case management? No   Patient currently receives home health services? No   Does the patient currently use HME? No   Patient currently receives private duty nursing? No "   Patient currently receives any other outside agency services? No   Do you have any problems affording any of your prescribed medications? No   Is the patient taking medications as prescribed? yes   Do you have any financial concerns preventing you from receiving the healthcare you need? No   Does the patient have transportation to healthcare appointments? Yes   Transportation Available car   On Dialysis? No   Does the patient receive services at the Coumadin Clinic? No   Are there any open cases? No   Discharge Plan A Home with family   Discharge Plan B Home with family;Home Health   Patient/Family In Agreement With Plan yes

## 2017-01-13 NOTE — PROGRESS NOTES
Progress Note  Medical oncology     Patient Name: Kat Corley  YOB: 1970    Admit Date: 1/11/2017                     LOS: 2    SUBJECTIVE:     Reason for Admission:  Multiple lesions of metastatic malignancy    This is Kat Corley     No acute event over night. Tolerating diet well. Still has back pain.       OBJECTIVE:     Vital Signs (Most Recent):    Temp: 98.1 °F (36.7 °C) (01/13/17 0348)  Pulse: 96 (01/13/17 0348)  Resp: (!) 21 (01/13/17 0348)  BP: 136/76 (01/13/17 0348)  SpO2: (!) 94 % (01/13/17 0348) Vital Signs Range (Last 24H):  Temp:  [98 °F (36.7 °C)-98.4 °F (36.9 °C)]   Pulse:  []   Resp:  [18-22]   BP: (127-156)/(76-92)   SpO2:  [92 %-95 %]      Body mass index is 30.76 kg/(m^2).    I & O (Last 24H):  Intake/Output Summary (Last 24 hours) at 01/13/17 0810  Last data filed at 01/13/17 0713   Gross per 24 hour   Intake             3915 ml   Output              400 ml   Net             3515 ml    Body mass index is 30.76 kg/(m^2).     Physical Exam:  A/O x3  Not in distress  CV: S1-S2 WNL, RRR, no M, R, G.  Chest: Effort normal. Coarse breath sounds bilaterally  Abd: Abdominal: Soft. Bowel sounds are normal. She exhibits distension. There is tenderness. There is no rebound and no guarding.     Diagnostic Results:    Labs:  CBC:     Recent Labs  Lab 01/11/17 1847 01/12/17 0557 01/13/17 0457   WBC 7.76 9.16 8.13   RBC 3.49* 3.55* 3.09*   HGB 10.4* 10.6* 9.2*   HCT 30.5* 31.9* 27.3*    365* 364*   MCV 87 90 88   MCH 29.8 29.9 29.8   MCHC 34.1 33.2 33.7       BMP:   Recent Labs  Lab 01/11/17 1847 01/12/17 0557 01/13/17 0457   * 116* 93   * 137 138   K 3.9 4.0 3.6   CL 97 99 103   CO2 31* 29 27   BUN 5* 5* 4*   CREATININE 0.5 0.5 0.4*   CALCIUM 10.0 10.3 9.5   MG 1.2* 1.3* 1.1*   PHOS 4.0 4.0 4.2       LFT:    Recent Labs  Lab 01/08/17  1838  01/11/17  1847 01/12/17  0557 01/13/17  0457   ALBUMIN 2.8*  < > 1.9* 1.9* 1.7*   PROT 7.6  < > 6.1 6.3 5.6*   ALKPHOS  1927*  < > 1512* 1503* 1105*   *  < > 161* 150* 101*   *  < > 84* 87* 50*   BILITOT 4.2*  < > 6.3* 6.7* 2.9*   INR 1.1  --  1.1 1.1  --    < > = values in this interval not displayed.    Estimated Creatinine Clearance: 171.7 mL/min (based on Cr of 0.4).    Lab Results   Component Value Date    HGBA1C 6.4 (H) 01/09/2017       No results for input(s): POCTGLUCOSE in the last 72 hours.    Imaging:     EUS 1/13/17:              - There was dilation in the common bile duct and in the intrahepatic bile duct(s) which measured up to 14 mm.                        - There was a suggestion of a stricture in the lower third of the main bile duct.                        - Pancreatic parenchymal abnormalities consisting of hyperechoic strands and lobularity were noted in the entire pancreas.                        - A few abnormal lymph nodes were visualized in the fernando hepatis region. Fine needle aspiration performed.                        - A mass measuring 38 mm by 19 mm was identified endosonographically in the left adrenal gland. Fine needle aspiration performed.    In-Hospital Medication:  Scheduled Meds:   gabapentin  300 mg Oral TID    magnesium sulfate IVPB  2 g Intravenous Once    magnesium sulfate IVPB  2 g Intravenous Once    morphine  30 mg Oral Q12H    nicotine  1 patch Transdermal Daily    polyethylene glycol  17 g Oral Daily    senna-docusate 8.6-50 mg  1 tablet Oral Daily    vitamin D  1,000 Units Oral Daily     Continuous Infusions:   sodium chloride 0.9% 150 mL/hr at 01/13/17 0229     PRN Meds:.albuterol-ipratropium 2.5mg-0.5mg/3mL, dextrose 50%, dextrose 50%, glucagon (human recombinant), glucose, glucose, HYDROmorphone, insulin aspart, ondansetron, oxycodone-acetaminophen, promethazine (PHENERGAN) IVPB    ASSESSMENT/PLAN:     Active Hospital Problems    Diagnosis  POA    *Multiple lesions of metastatic malignancy [C79.9]  Yes    Obstructive jaundice [K83.8]  Yes    Pancreatitis  [K85.90]  Yes    Metastatic cancer to bone [C79.51]  Yes      Resolved Hospital Problems    Diagnosis Date Resolved POA   No resolved problems to display.     Multiple lesions of metastatic malignancy  - unknown primary  - No prior Colonoscopy, No hx of abnormal pap smears, Last mammogram and pap smear were 4 years ago which was normal.  - Family history significant for father with brain tumor at 44, mother lung cancer both have passed away within the past year and paternal grandmother had breast cancer.  - : >6000, likely pancreatic  - : >5400  - CT abd abdomen, pelvis, chest and head done in OSH-> sacrum/lumbar/thoracic osteolytic lesions seen, bulkiness of the pancreas without a focal mass defined yet, 14 mm mass between the stomach and spleen, other subcutaneous findings as well  - superficial skin nodule biopsy done in OSH  - F/u path results  - Mammogram and NM bone scan ordered.      Cigarette smoker  nicotine patch      Pancreatitis  - lipase 181 on 1/8  - Tolerating diet  - On  cc/hour.   - On morphine 12 hour 30 mg BID. Roxicet Q4hrs, and Hydromorphone 1 mg Q4hrs.   - T.Bili and LFTs trending down     Liver function abnormality  - 2/2 obstruction likely malignancy  - CBD noted on imaging with intrahepatic and extrahepatic dilations  - T bili has been up trending, now 6  - Alk phos 1500's  - AST and ALT down trending  - S/P EUS. No ERCP report. Will touch base with PBS     Code: Full   Diet: Regular  Ruiz cath: No  Steroids: No  Dispo: Home pending clinical improvement       DVT Prophylaxis:  Anticoagulants   Medication Route Frequency       Nathaniel Rodriguez MD  Internal Medicine Resident (PGY-I)  Spectra link: [id number]

## 2017-01-13 NOTE — PROGRESS NOTES
[facility] ([facility]) Follow-up Note    Patient appears well post-procedure.   She complains of back pain - however this was present prior to procedure.   A biliary stricture was found - this was stented (covered metal stent).   FNA was performed on fernando hepatitis LN and adrenal mass - follow up path.   I will arrange for stent exchange in 3 months.   Case discussed with primary team fellow.     Thank you for allowing us to participate in the care of Kat Corley. Please do not hesitate to reconsult us with questions.    Leah Gonzalez MD PGY-6  Gastroenterology Fellow  Pager# [phone]

## 2017-01-14 NOTE — PROGRESS NOTES
Discharge instructions and prescriptions given and explained to pt.  Pt. verbalized understanding with no further questions.  Peripheral IV D/C'd with catheter tip intact.  VS WDL.  Patient is awaiting ride home by lia.      ROAD TEST  O=SpO2 90% on 2L O2  A=Ambulating around room  D=IV D/C'd  T=Tolerating regular diet  E=Voids  S=Performs self care independently

## 2017-01-16 NOTE — TELEPHONE ENCOUNTER
----- Message from Diamond Manzo RN sent at 1/13/2017 12:49 PM CST -----  Contact: Diamond Ceron,     I was told that no one was really covering for Arelis today.  Ms Corley will d/c today.  Can you please schedule a lab draw and a f/u with Dr Rasmussen next week.      Thanks,  Diamond   [phone]

## 2017-01-17 NOTE — PHYSICIAN QUERY
PT Name: Kat Corley  MR #: [id number]    Physician Query Form - Pathology Findings Clarification     Reviewer  Ext  Vinny  [phone]   This form is a permanent document in the medical record.     Query Date: January 17, 2017    Dear Provider,   By submitting this query, we are merely seeking further clarification of documentation.  Please utilize your independent clinical judgment when addressing the question(s) below.      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                 [x ]   I agree with the Pathology Findings               [   ]   I do not agree with the Pathology Findings               [   ]   Clinically insignificant               [   ]   Clinically undetermined               [   ]   Other/Clarification of findings: _________________________________________________

## 2017-01-17 NOTE — PATIENT INSTRUCTIONS
Discharge Instructions for Chronic Pancreatitis  You have been diagnosed with long-term (chronic) pancreatitis. This is caused by repeated cases of inflammation of your pancreas. It results in permanent scarring of the pancreatic tissue. The pancreas is an organ that makes chemicals and hormones that help you digest food and use sugar for energy. Some causes of chronic pancreatitis are the continued use of alcohol and tobacco, genetic disorders, and structural problems in the pancreas. Here's what you can do at home to help with your condition.  Home care  Suggestions for home care include the following:   · Ask someone to drive you to appointments until you know how the illness has affected you.  · Tell your healthcare provider about any medicines you are taking.  · Take your medicines exactly as directed. Dont skip doses.  · Ask your healthcare provider about over-the-counter pain medicines, if needed.  · Learn to monitor your blood sugar. Keep a record of your readings. Work with your healthcare provider to control blood sugar levels.  · Learn to take your own pulse. Keep a record of your results. Ask your healthcare provider which readings mean that you need medical attention.  · Watch for symptoms that your pancreatitis is getting worse. These symptoms include abdominal pain, nausea and vomiting, diarrhea or oil in your stool, weight loss, and fever.  Diet changes  Suggestions for dietary changes include the following:  · Eat a low-fat diet. Ask your healthcare provider for menus and other diet information.  · Take vitamins A, D, and E, and add calcium to your diet.  · Your healthcare provider may recommend digestive enzymes to take with each meal and snack.   · Stop drinking, especially if your illness was caused by alcohol.  ¨ Ask your healthcare provider about alcohol abuse programs and support groups such as Alcoholics Anonymous.  ¨ Ask your healthcare provider about prescription medicines that can help  you stop drinking.  Follow-up care  Make a follow-up appointment as directed by our staff.  When to call your healthcare provider  Call your healthcare provider right away if you have any of the following:  · Fever above 100°F (37.7°C)  · Severe pain in your upper abdomen to your back  · Nausea and vomiting  · Abdominal swelling and tenderness  · Loss of weight without dieting   © 1562-4285 [employer]. [address] [place] [address], [place] [address]. All rights reserved. This information is not intended as a substitute for professional medical care. Always follow your healthcare professional's instructions.        Discharge Instructions for Chronic Pancreatitis  You have been diagnosed with long-term (chronic) pancreatitis. This is caused by repeated cases of inflammation of your pancreas. It results in permanent scarring of the pancreatic tissue. The pancreas is an organ that makes chemicals and hormones that help you digest food and use sugar for energy. Some causes of chronic pancreatitis are the continued use of alcohol and tobacco, genetic disorders, and structural problems in the pancreas. Here's what you can do at home to help with your condition.  Home care  Suggestions for home care include the following:   · Ask someone to drive you to appointments until you know how the illness has affected you.  · Tell your healthcare provider about any medicines you are taking.  · Take your medicines exactly as directed. Dont skip doses.  · Ask your healthcare provider about over-the-counter pain medicines, if needed.  · Learn to monitor your blood sugar. Keep a record of your readings. Work with your healthcare provider to control blood sugar levels.  · Learn to take your own pulse. Keep a record of your results. Ask your healthcare provider which readings mean that you need medical attention.  · Watch for symptoms that your pancreatitis is getting worse. These symptoms include abdominal pain, nausea and  vomiting, diarrhea or oil in your stool, weight loss, and fever.  Diet changes  Suggestions for dietary changes include the following:  · Eat a low-fat diet. Ask your healthcare provider for menus and other diet information.  · Take vitamins A, D, and E, and add calcium to your diet.  · Your healthcare provider may recommend digestive enzymes to take with each meal and snack.   · Stop drinking, especially if your illness was caused by alcohol.  ¨ Ask your healthcare provider about alcohol abuse programs and support groups such as Alcoholics Anonymous.  ¨ Ask your healthcare provider about prescription medicines that can help you stop drinking.  Follow-up care  Make a follow-up appointment as directed by our staff.  When to call your healthcare provider  Call your healthcare provider right away if you have any of the following:  · Fever above 100°F (37.7°C)  · Severe pain in your upper abdomen to your back  · Nausea and vomiting  · Abdominal swelling and tenderness  · Loss of weight without dieting   © 6114-6217 [employer]. [address] [place] [address], [place] [address]. All rights reserved. This information is not intended as a substitute for professional medical care. Always follow your healthcare professional's instructions.

## 2017-01-23 PROBLEM — E83.52 HYPERCALCEMIA OF MALIGNANCY: Status: ACTIVE | Noted: 2017-01-01

## 2017-01-23 NOTE — TELEPHONE ENCOUNTER
Spoke to pharmacist and they reported that despite not having Dr. Yeh's IONA #, they do not have the medication.

## 2017-01-23 NOTE — TELEPHONE ENCOUNTER
----- Message from Yin العلي sent at 1/23/2017 10:51 AM CST -----  Contact: Bea with [facility]   Bea with [facility] is calling to get IONA number for , pharmacy is requesting for pt (oxycodone (ROXICODONE) 10 mg Tab immediate release tablet).  Contact number [phone]  Fax number [phone]

## 2017-01-23 NOTE — Clinical Note
Follow up on 2/6 with Dr. Yeh and Dr. Rasmussen with lab work (CBC, CMP). Please arrange for Mammogram and Radiation Oncology Clinic visit as soon as possible. Also schedule patient for Zometa infusion on 1/25. THank you!

## 2017-01-23 NOTE — TELEPHONE ENCOUNTER
----- Message from Yin العلي sent at 1/23/2017 11:02 AM CST -----  Contact: Bea with [facility]   Bea with [facility] is calling back to inform nurse pt (oxycodone (ROXICODONE) 10 mg Tab immediate release tablet) is not available, pharmacy states prescription would need to transfer to another.  Contact number [phone]

## 2017-01-24 NOTE — PROGRESS NOTES
PATIENT: Kat Corley  MRN: [id number]  DATE: 1/23/2017    Diagnosis:   1. Primary cancer of unknown site    2. Pain, neoplasm-related    3. Hypercalcemia of malignancy        Oncologic History:      Oncologic History Metastatic Adenocarcinoma of Unknown Primary - 1/10/17    Oncologic Treatment     Pathology LEFT ANTERIOR ABDOMINAL MASS (NEEDLE BIOPSY) - 1/10/17:   -Positive for malignancy - Adenocarcinoma  -GCDFP (breast marker) is positive which is suspicious for breast origin. ER and NJ is negative. Cannot rule out other primary source such as lung and others.    Left adrenal, mass, cytology - 1/12/17:  - Positive for malignant cells, adenocarcinoma,     MANUEL HEPATIS LYMPH NODE (EUS FINE-NEEDLE ASPIRATION WITH PATHOLOGIST ADEQUACY) - 1/12/17:  -Negative for malignant cells  -Mixed population of lymphocytes       Chief Complaint: Metastatic Cancer of Unknown Primary    HPI:  Mrs. Corley is a 47 y/o F who is referred to Medical Oncology Clinic for management of newly diagnosed Metastatic Adenocarcinoma of Unknown Primary. Her history dates back to early January 2017, when she presented to an OSH for evaluation of chest pain. She describes the pain as a burning sensation to her LEFT chest which is non-radiating and intermittent. She denies shortness of breath. Associated with 30 pounds weight loss in the past 4 months. She was evaluated in the ED and was found to have elevated lipase, liver enzymes and bilirubin. A CT of the abdomen revealed pancreatitis, dilated CBD, and extensive metastatic lesions in vertebral spine and abdomen.  CT brain, CT chest does not reveal any primary source of malignancy. CEA and CA 19-9 significantly elevated (5400) and (37687), respectively. Ultrasound guided biopsy of abd subcutaneous fat pad was consistent with adenocarcinoma. She was transferred to [facility] for AES evaluation for hyperbilirubinemia. EUS with FNA biopsy of lymph node and adrenal gland were performed, of which the  adrenal mass consistent with adenocarcinoma. ERCP with a metal stent performed as well with resultant decrease in bilirubin upon discharge. Since discharge, she primarily has lower back and hip pain. Pain is somewhat alleviated with current pain regimen. Denies any yellowing of eyes or skin.     Review of Systems    ECOG Performance Status: 1   Objective:      Vitals:   Vitals:    01/23/17 0954   BP: 121/60   Pulse: (!) 115   Resp: 20   Temp: 98 °F (36.7 °C)   TempSrc: Oral   SpO2: 100%   Weight: 75.7 kg (166 lb 14.2 oz)     BMI: Body mass index is 30.52 kg/(m^2).    Physical Exam    Laboratory Data:     WBC 9.10   RBC 3.53 (L)   Hemoglobin 10.0 (L)   Hematocrit 31.1 (L)   MCV 88   MCH 28.3   MCHC 32.2   RDW 15.7 (H)   Platelets 385 (H)   MPV 10.6   Gran # 6.0     Sodium 141   Potassium 3.8   Chloride 97   CO2 33 (H)   Glucose 155 (H)   BUN, Bld 10   Creatinine 1.1   Calcium 11.1 (H)   Total Protein 6.9   Albumin 2.3 (L)   Total Bilirubin 1.6 (H)   Alkaline Phosphatase 338 (H)   AST 33   ALT 36   Anion Gap 11   eGFR if  >60.0   eGFR if non  >60.0       - : >60,000    - : >7770    - CEA: 5485    Imaging:     CT of Chest/Abdomen/Pelvis:    Extensive rounded osteolytic bone metastases are identified in the sacrum lumbar and thoracic spines.  Bilateral adrenal masses are consistent with metastasis as well.  Dilation of the common bile duct and bulkiness of the pancreas without a focal mass defined yet.  A 14 mm mass between the stomach and spleen may represent a third accessory spleen or a metastasis.  A gallbladder is not identified.     No primary malignant lesion is identified within the chest.    Pattern of metastatic disease:  -Bilateral adrenal gland masses  -Multiple metastatic lesions involving the visualized axial and appendicular skeleton including nondisplaced pathologic fractures involving the right posterior 12th and right posterior eighth ribs  -Mild mediastinal  lymphadenopathy, favored to be reactive but less likely metastatic in origin  -Multiple small soft tissue masses involving the subcutaneous fat of the chest and upper abdomen, concerning for soft tissue metastasis.    MRI of Thoracic/Lumbar Spine:    Innumerable osseous metastatic lesions throughout the visualized thoracic and lumbar spine.  No fractures.    Abnormal enlargement and enhancement of the exiting left L2 nerve root, findings that are suggestive of neural spread of disease possibly from direct contact from the adjacent osseous lesion.    Abnormal dilatation of the common bile duct and pancreatic duct without discrete mass at the level of the pancreas noted limitations due to technique.    Bilateral adrenal masses, likely metastatic in nature.    Bone Scan:    Diffuse bone metastases.    Assessment:       1. Primary cancer of unknown site    2. Pain, neoplasm-related    3. Hypercalcemia of malignancy      1. Metastatic Adenocarcinoma of Unknown Primary  - discussed with patient and friend about disease condition, management, and prognosis. Reviewed CT Scans and Pathology Reports.  - explained to her that disease has advanced and is not curable but treatable. Treatment will be tailored according to the tumor of origin.  - will obtain mammogram given there is concern of primary breast origin.  - f/u with pathology regarding Her2-maria a testing  - sent of [employer] for accurate diagnosis for metastatic patients with diagnostic ambiguity.   - will consider further evaluation if above diagnostics work-up is not helpful.     2. Pain related to Neoplasm  - adjusted pain regimen today  - will refer to Radiation Oncology for palliative RT    3. Hypercalcemia of Malignancy  - corrected Ca++ 12.5 today  - clinically asymptomatic  - will arrange for Zometa infusion on Wednesday 1/25 as patient is unable to come in tomorrow.   - will repeat CMP next week     4. Hyperbilirubinemia   - resolving after stent  placement.     RTC in 2 weeks to reviewed diagnostic work-up and discuss further management.     Case discussed with Dr. Grady Yeh MD  Hematology/Oncology Fellow       Attending Addendum:  The patient was seen, examined, and discussed in the clinic with Dr. Yeh.  I agree with the assessment and plan as outlined for Kat Corley.      Hung Rasmussen DO, [facility]  Hematology & Oncology  [facility] [place] [address]  ph. [phone]  Fax. [phone]    25 minutes were spent in coordination of patient's care, record review and counseling.  More than 50% of the time was face-to-face.

## 2017-01-25 NOTE — PLAN OF CARE
Problem: Patient Care Overview  Goal: Individualization & Mutuality  Outcome: Ongoing (interventions implemented as appropriate)  Patient here for C1D1 of Zometa.  Assessment complete and labs reviewed.  Discussed Zometa's mechanism of action, side effects, and calcium/vitamin D supplementation.  Issued [facility] handout on Zometa.  Patient denies any dental work in the past three months and verbalizes understanding to not have any future dental work while on Zometa.  VSS.  Will continue to monitor.

## 2017-01-25 NOTE — PROGRESS NOTES
REFERRING PHYSICIAN: Sylvester Yeh MD    PROBLEM: Mrs Corley is a 46 years old woman presenting with an adenocarcinoma metastatic to skeleton, subcutaneous tissue and adrenal glands from an uncertain primary site, possibly ovarian or pancreatic, who has pain in the sacrum and low lumbar spine.     OTHER MEDICAL HISTORY: Patient smokes. She has had a  and tubal ligation. She is treated or followed for peripheral neuropathy. She works as a  at [employer]. PS is ECOG 1. Psychosocial Distress screening score of Distress Score: 6 noted and reviewed. No intervention indicated.    PRESENT ILLNESS: Patient was seen in the ED at [facility] on 17 complaining of left sided chest pain and 30 pound weight loss over the preceding 4 months. The bilirubin was 4.2, the Alk phos was 1927, the AST ws 146 and the ALT was 537. CT of the abdomen and pelvis on  and of the chest on   showed metastatic lesions throughout the visualized skeleton, bilateral adrenal masses, subcutaneous masses,  fractures of the right 8 and 12th ribs, changes of pancreatitis including swollen appearance of the pancreas and dilated biliary and pancreatic ducts. The uterus is present and there is a left adnexal mass.   She was admitted. Biopsy of a subcutaneous nodule on 1/10/17 reports adenocarcinoma positive for GCDP ( breast) and TTF-1 (lung or thyroid). On 17 she underwent upper GI EUS and ERCP. Biopsy of the left adrenal mass reports adenocarcinoma. A stent was placed across a stricture in the common bile duct. At present she complains of pain centering at L5 without radiation to the lower extremities.     PHYSICAL EXAM: Patient is and alert woman who responds appropriately. She indicates pain as above. The respirations are normal. There is no jaundice. The extremities are without edema. There are no evident neurologic deficits.     RADIOLOGIC STUDIES: In addition to that noted above, MRI of the thoracic and  lumbar spine on 1/13/17 confirms the many skeletal and other metastasis and notes thickening of the left L2 nerve root. It does not show any invasion epidural space. CT of the head with contrast on 1/9/17 shows no significant abnormality.     LABORATORY STUDIES:  In addition to that noted above, on 1/12/17 the Hb is 9.2 with a wbc of 8,130 and a platelet count of 364,000. A CA-125 was 7770 on 1/12/17.     IMPRESSION: The suspect primary sites include pancreas, breast, lung and ovary or peritoneum. A  may be helpful in distinguishing these. Given the left adnexal mass and the elevated  ovarian cancer is the most likely primary. In any event, treatment of the lumbar spine and sacrum with radiation is recommended to relieve pain.     PLAN: Patient returns 1/27/17 for radiation treatment planning. A course of radiation to a dose of 30 Gy in fractions of 3 Gy each is expected to begin on about 1/30/17. (45 minutes in discussion with patient).

## 2017-01-25 NOTE — MR AVS SNAPSHOT
"Patient Information     Patient Name Sex     Kat Corley Female 1970      Visit Information        Provider Department Dept Phone Center    2017 3:00 PM [facility], CHEMO [facility]fusion [phone] Isidro Genao      Patient Instructions     None      Your Current Medications Are     cyanocobalamin (VITAMIN B-12) 100 MCG tablet    gabapentin (NEURONTIN) 300 MG capsule    morphine (MS CONTIN) 30 MG 12 hr tablet    multivitamin (ONE DAILY MULTIVITAMIN) per tablet    nicotine (NICODERM CQ) 14 mg/24 hr    oxycodone (ROXICODONE) 10 mg Tab immediate release tablet    polyethylene glycol (GLYCOLAX) 17 gram PwPk    pyridoxine, vitamin B6, (VITAMIN B-6) 100 MG Tab    senna-docusate 8.6-50 mg (PERICOLACE) 8.6-50 mg per tablet      Facility-Administered Medications     zoledronic acid (ZOMETA) 4 mg in sodium chloride 0.9% 100 mL IVPB      Appointments for Next Year     2017 11:00 AM SIMULATION 1 HR (60 min.) [facility] SIMULATION, RADIATION    Arrive at check-in approximately 15 minutes before your scheduled appointment time. Bring all outside medical records and imaging, along with a list of your current medications and insurance card.    1st Floor - [facility]         Default Flowsheet Data (last 24 hours)      Amb Complex Vitals Lele        17 1539 17 1456             Measurements    Weight  75.3 kg (166 lb)       Height  5' 2" (1.575 m)       BSA (Calculated - sq m)  1.81 sq meters       BMI (Calculated)  30.4       BP (!)  109/58 121/60       Temp 99.7 °F (37.6 °C) 98 °F (36.7 °C)       Pulse (!)  127 (!)  115       Resp 16 20       Pain Assessment    Pain Score  Five               Allergies     No Known Allergies      Medications You Received from 2017 1627 to 2017 1627        Date/Time Order Dose Route Action     2017 1536 zoledronic acid (ZOMETA) 4 mg in sodium chloride 0.9% 100 mL IVPB 4 mg Intravenous New Bag      Current Discharge Medication " List     Cannot display discharge medications since this is not an admission.

## 2017-01-25 NOTE — LETTER
January 25, 2017      Sylvester Yeh MD  [address]  [facility] [address]           [facility] Radiation Oncology  [address]  [facility] [address]  Phone: [phone]          Patient: Kat Corley   MR Number: [id number]   YOB: 1970   Date of Visit: 1/25/2017       Dear Dr. Sylvester Yeh:    Thank you for referring Kat Corley to me for evaluation. Attached you will find relevant portions of my assessment and plan of care.    If you have questions, please do not hesitate to call me. I look forward to following Kat Corley along with you.    Sincerely,    Juanjo Tompkins MD    Enclosure  CC:  No Recipients    If you would like to receive this communication electronically, please contact [email or web address] or [phone] to request more information on [employer] access.    For providers and/or their staff who would like to refer a patient to [facility], please contact us through our one-stop-shop provider referral line, [facility] , at [phone].    If you feel you have received this communication in error or would no longer like to receive these types of communications, please e-mail [email or web address]

## 2017-01-26 NOTE — TELEPHONE ENCOUNTER
----- Message from Sylvester Yeh MD sent at 1/23/2017  6:40 PM CST -----  Follow up on 2/6 with Dr. Yeh and Dr. Rasmussen with lab work (CBC, CMP). Please arrange for Mammogram and Radiation Oncology Clinic visit as soon as possible. Also schedule patient for Zometa infusion on 1/25. THank you!

## 2017-01-30 NOTE — TELEPHONE ENCOUNTER
----- Message from Angelica Keane sent at 1/30/2017  3:19 PM CST -----  Pt needs to speak to you about rescheduling her appointment. She said she got lost trying to find [facility] today. Please call at [phone].  Return call to patient instructions given  Will treat tmrw at 10 am

## 2017-01-30 NOTE — TELEPHONE ENCOUNTER
Phone call to Ms Corley after she no showed for her radiation unable to reach significant other also called message left on answer machine

## 2017-02-02 PROBLEM — C80.1 CANCER: Status: ACTIVE | Noted: 2017-01-01

## 2017-02-02 NOTE — ED NOTES
Pt unable to tolerate liquid potassium supplement, PA notified. Pt not actively vomiting but unable to tolerate.

## 2017-02-02 NOTE — IP AVS SNAPSHOT
[facility] [place], operated by [facility]  [address] [place] [address]  Phone: [phone]           Patient Discharge Instructions     Our goal is to set you up for success. This packet includes information on your condition, medications, and your home care. It will help you to care for yourself so you don't get sicker and need to go back to the hospital.     Please ask your nurse if you have any questions.        There are many details to remember when preparing to leave the hospital. Here is what you will need to do:    1. Take your medicine. If you are prescribed medications, review your Medication List in the following pages. You may have new medications to  at the pharmacy and others that you'll need to stop taking. Review the instructions for how and when to take your medications. Talk with your doctor or nurses if you are unsure of what to do.     2. Go to your follow-up appointments. Specific follow-up information is listed in the following pages. Your may be contacted by a transition nurse or clinical provider about future appointments. Be sure we have all of the phone numbers to reach you, if needed. Please contact your provider's office if you are unable to make an appointment.     3. Watch for warning signs. Your doctor or nurse will give you detailed warning signs to watch for and when to call for assistance. These instructions may also include educational information about your condition. If you experience any of warning signs to your health, call your doctor.               ** Verify the list of medication(s) below is accurate and up to date. Carry this with you in case of emergency. If your medications have changed, please notify your healthcare provider.             Medication List      START taking these medications        Additional Info                      dexamethasone 4 MG Tab   Commonly known as:  DECADRON   Quantity:  40 tablet   Refills:  0   Dose:  8 mg    Last time this  was given:  8 mg on 2/11/2017  8:35 AM   Instructions:  Take 2 tablets (8 mg total) by mouth every 12 (twelve) hours.     Begin Date    AM    Noon    PM    Bedtime       fentaNYL 75 mcg/hr   Commonly known as:  DURAGESIC   Quantity:  5 patch   Refills:  0   Dose:  1 patch    Last time this was given:  1 patch on 2/9/2017 12:28 PM   Instructions:  Place 1 patch onto the skin every 72 hours.     Begin Date    AM    Noon    PM    Bedtime       ondansetron 4 MG Tbdl   Commonly known as:  ZOFRAN-ODT   Quantity:  30 tablet   Refills:  0   Dose:  4 mg    Instructions:  Take 1 tablet (4 mg total) by mouth every 8 (eight) hours as needed.     Begin Date    AM    Noon    PM    Bedtime         CONTINUE taking these medications        Additional Info                      gabapentin 300 MG capsule   Commonly known as:  NEURONTIN   Refills:  0   Dose:  300 mg    Last time this was given:  300 mg on 2/11/2017  6:07 AM   Instructions:  Take 300 mg by mouth 3 (three) times daily.     Begin Date    AM    Noon    PM    Bedtime       morphine 30 MG 12 hr tablet   Commonly known as:  MS CONTIN   Quantity:  60 tablet   Refills:  0   Dose:  30 mg    Last time this was given:  30 mg on 2/11/2017  8:35 AM   Instructions:  Take 1 tablet (30 mg total) by mouth every 12 (twelve) hours.     Begin Date    AM    Noon    PM    Bedtime       nicotine 14 mg/24 hr   Commonly known as:  NICODERM CQ   Refills:  0   Dose:  1 patch    Last time this was given:  1 patch on 2/11/2017  8:41 AM   Instructions:  Place 1 patch onto the skin once daily.     Begin Date    AM    Noon    PM    Bedtime       ONE DAILY MULTIVITAMIN per tablet   Refills:  0   Dose:  1 tablet   Generic drug:  multivitamin    Instructions:  Take 1 tablet by mouth once daily.     Begin Date    AM    Noon    PM    Bedtime       oxycodone 10 mg Tab immediate release tablet   Commonly known as:  ROXICODONE   Quantity:  30 tablet   Refills:  0   Dose:  15 mg    Last time this was given:  15  mg on 2/11/2017 10:21 AM   Instructions:  Take 1.5 tablets (15 mg total) by mouth every 4 (four) hours as needed for Pain.     Begin Date    AM    Noon    PM    Bedtime       polyethylene glycol 17 gram Pwpk   Commonly known as:  GLYCOLAX   Quantity:  30 each   Refills:  2   Dose:  17 g    Last time this was given:  17 g on 2/11/2017  8:35 AM   Instructions:  Take 17 g by mouth once daily.     Begin Date    AM    Noon    PM    Bedtime       senna-docusate 8.6-50 mg 8.6-50 mg per tablet   Commonly known as:  PERICOLACE   Refills:  0   Dose:  1 tablet    Last time this was given:  1 tablet on 2/11/2017  8:35 AM   Instructions:  Take 1 tablet by mouth once daily.     Begin Date    AM    Noon    PM    Bedtime       VITAMIN B-12 100 MCG tablet   Refills:  0   Dose:  100 mcg   Generic drug:  cyanocobalamin    Last time this was given:  250 mcg on 2/11/2017  8:35 AM   Instructions:  Take 100 mcg by mouth once daily.     Begin Date    AM    Noon    PM    Bedtime       VITAMIN B-6 100 MG Tab   Refills:  0   Dose:  100 mg   Generic drug:  pyridoxine (vitamin B6)    Last time this was given:  100 mg on 2/11/2017  8:35 AM   Instructions:  Take 100 mg by mouth once daily.     Begin Date    AM    Noon    PM    Bedtime            Where to Get Your Medications      These medications were sent to [facility]/pharmacy #[id number] - [place], [place] - [address]  [address], [place] [address]     Phone:  [phone]     dexamethasone 4 MG Tab    ondansetron 4 MG Tbdl         You can get these medications from any pharmacy     Bring a paper prescription for each of these medications     fentaNYL 75 mcg/hr    morphine 30 MG 12 hr tablet    oxycodone 10 mg Tab immediate release tablet                  Please bring to all follow up appointments:    1. A copy of your discharge instructions.  2. All medicines you are currently taking in their original bottles.  3. Identification and insurance card.    Please arrive 15 minutes ahead of  "scheduled appointment time.    Please call 24 hours in advance if you must reschedule your appointment and/or time.        Follow-up Information     Follow up with NAMITA Guerin In 1 week.    Specialty:  Family Medicine    Contact information:    [address] [place] [address]  [phone]          Schedule an appointment as soon as possible for a visit with Sylvester Yeh MD.    Specialty:  Oncology    Contact information:    [address]  [facility] [address]  [phone]          Discharge Instructions     Future Orders    Activity as tolerated     Call MD for:  persistent nausea and vomiting or diarrhea     Call MD for:  severe uncontrolled pain     Call MD for:  temperature >100.4     Diet general     Questions:    Total calories:      Fat restriction, if any:      Protein restriction, if any:      Na restriction, if any:      Fluid restriction:      Additional restrictions:          Primary Diagnosis     Your primary diagnosis was:  Weakness      Admission Information     Date & Time Provider Department CSN    2/2/2017 10:20 AM Ortega King MD [facility] [id number]      Care Providers     Provider Role Specialty Primary office phone    Ortega King MD Attending Provider Hospitalist [phone]    Charlie Carrero MD Consulting Physician  Hematology and Oncology [phone]    Charlie Olmedo Jr., MD Consulting Physician  Vascular Surgery [phone]      Your Vitals Were     BP Pulse Temp Resp Height Weight    126/79 (BP Location: Right arm, Patient Position: Lying, BP Method: Automatic) 86 97.8 °F (36.6 °C) (Oral) 18 5' 2" (1.575 m) 83 kg (182 lb 15.7 oz)    Last Period SpO2 BMI          01/02/2017 95% 33.47 kg/m2        Recent Lab Values        1/9/2017                           5:45 AM           A1C 6.4 (H)           Comment for A1C at  5:45 AM on 1/9/2017:  According to ADA guidelines, hemoglobin A1C <7.0% represents  optimal control in non-pregnant diabetic " patients.  Different  metrics may apply to specific populations.   Standards of Medical Care in Diabetes - 2016.  For the purpose of screening for the presence of diabetes:  <5.7%     Consistent with the absence of diabetes  5.7-6.4%  Consistent with increasing risk for diabetes   (prediabetes)  >or=6.5%  Consistent with diabetes  Currently no consensus exists for use of hemoglobin A1C  for diagnosis of diabetes for children.        Allergies as of 2/11/2017     No Known Allergies      [facility]     [facility] Nurse Care Line - 24/7 Assistance  Unless otherwise directed by your provider, please contact [facility], our nurse care line that is available for 24/7 assistance.     Registered nurses in the [facility] provide clinical advisement, health education, appointment booking, and other advisory services.  Call for this free service at [phone].        Advance Directives     An advance directive is a document which, in the event you are no longer able to make decisions for yourself, tells your healthcare team what kind of treatment you do or do not want to receive, or who you would like to make those decisions for you.  If you do not currently have an advance directive, [facility] encourages you to create one.  For more information call:  [phone]), [phone] ([phone]),  or log on to [email or web address].        Smoking Cessation     If you would like to quit smoking:   You may be eligible for free services if you are a [place] resident and started smoking cigarettes before September 1, 1988.  Call the Smoking Cessation Trust (SCT) toll free at (216) 796-1918 or (259) 255-2456.   Call 0-914-QUIT-NOW if you do not meet the above criteria.            Language Assistance Services     ATTENTION: Language assistance services are available, free of charge. Please call [phone].      ATENCIÓN: Si habla español, tiene a bryant disposición servicios  nalini de asistencia lingüística. Alfonzo hernandez [phone].     RUBÉN Ý: N?u b?n nói Ti?ng Vi?t, có các d?ch v? h? tr? ngôn ng? mi?n phí dành cho b?n. G?i s? [phone].        [facility] Sign-Up     Activating your [facility] account is as easy as 1-2-3!     1) Visit [email or web address], select Sign Up Now, enter this activation code and your date of birth, then select Next.  [email or web address]  Expires: 2/27/2017  1:08 PM      2) Create a username and password to use when you visit [facility] in the future and select a security question in case you lose your password and select Next.    3) Enter your e-mail address and click Sign Up!    Additional Information  If you have questions, please e-mail [email or web address] or call [phone] to talk to our [facility] staff. Remember, [facility] is NOT to be used for urgent needs. For medical emergencies, dial 911.          [facility] complies with applicable Federal civil rights laws and does not discriminate on the basis of race, color, national origin, age, disability, or sex.

## 2017-02-02 NOTE — ED PROVIDER NOTES
Encounter Date: 2017       History     Chief Complaint   Patient presents with    Emesis     Pt reports nausea and vomiting for the last few days. Pt states she has bone cancer and just finished radiation today.      Review of patient's allergies indicates:  No Known Allergies  HPI Comments: Pt is a 46 yr old female with hx of newly diagnosed metastatic adenocarcinoma of unknown primary that was diagnosed upon admission for pancreatitis in 2017.  Patient recently started radiation therapy.  Patient states since being home over last 2 days she's felt very weak.  Patient reports nausea and vomiting.  Patient states she has loss of appetite.  Patient states she is taking her at home pain medication which is not relieving her pain.  Patient states she is hurting all over her body.  Patient denies any fevers or chills.  Patient reports extreme fatigue.  Patient denies urinary symptoms.  Patient reports any worsening shortness of breath.  Patient states she does live with her daughter, but she does not have much help.  Patient states she is not able to take care of herself.        The history is provided by the patient.     Past Medical History   Diagnosis Date    Cancer     Neuropathy      No past medical history pertinent negatives.  Past Surgical History   Procedure Laterality Date    Tonsillectomy       section      Tubal ligation       History reviewed. No pertinent family history.  Social History   Substance Use Topics    Smoking status: Current Every Day Smoker     Packs/day: 0.50     Years: 15.00     Types: Cigarettes    Smokeless tobacco: None    Alcohol use No     Review of Systems   Constitutional: Positive for activity change, appetite change and fatigue. Negative for chills and fever.   HENT: Negative for congestion, ear discharge, ear pain, hearing loss, mouth sores, postnasal drip, rhinorrhea, sore throat and trouble swallowing.    Eyes: Negative for photophobia and visual  disturbance.   Respiratory: Positive for shortness of breath (baseline). Negative for cough.    Cardiovascular: Negative for chest pain.   Gastrointestinal: Positive for nausea and vomiting. Negative for abdominal pain, blood in stool, constipation and diarrhea.   Genitourinary: Negative for dysuria, flank pain and hematuria.   Musculoskeletal: Positive for myalgias. Negative for back pain, neck pain and neck stiffness.   Skin: Negative for rash and wound.   Neurological: Positive for weakness (generalized). Negative for dizziness, syncope, speech difficulty, light-headedness, numbness and headaches.       Physical Exam   Initial Vitals   BP Pulse Resp Temp SpO2   02/02/17 1015 02/02/17 1015 02/02/17 1015 02/02/17 1015 02/02/17 1015   117/82 104 19 98.3 °F (36.8 °C) 95 %     Physical Exam    Nursing note and vitals reviewed.  Constitutional: She is not diaphoretic.  Non-toxic appearance. She has a sickly appearance. She appears distressed (secondary to pain).   HENT:   Head: Normocephalic.   Right Ear: Hearing and external ear normal.   Left Ear: Hearing and external ear normal.   Nose: Nose normal.   Mouth/Throat: Uvula is midline and oropharynx is clear and moist. Mucous membranes are dry. No trismus in the jaw. No uvula swelling. No oropharyngeal exudate.   Eyes: Conjunctivae are normal. Pupils are equal, round, and reactive to light.   Neck: Normal range of motion. Neck supple. Normal range of motion present. No rigidity.   Cardiovascular: Normal rate and regular rhythm.   Pulmonary/Chest: No respiratory distress. She has wheezes (bilateral expiratory). She has no rhonchi. She has no rales. She exhibits no tenderness.   Abdominal: Soft. Bowel sounds are normal. She exhibits no distension and no mass. There is tenderness (mild diffuse with deep palpation). There is no rebound and no guarding.   Lymphadenopathy:     She has no cervical adenopathy.   Neurological: She is alert and oriented to person, place, and  time.   Skin: Skin is warm and dry.   Psychiatric: She has a normal mood and affect.         ED Course   Procedures  Labs Reviewed   CBC W/ AUTO DIFFERENTIAL   COMPREHENSIVE METABOLIC PANEL   LIPASE             Medical Decision Making:   Initial Assessment:   Urgent evaluation of a 46-year-old female with history of newly diagnosed metastatic adenocarcinoma of unknown primary that was diagnosed upon admission for pancreatitis in January 2017.  Patient presents to the emergency department with nausea, vomiting, and generalized weakness.  Patient is afebrile and nontoxic appearing.  Patient is tachycardic with dry mucous membranes.  She appears to feel very sick.  No evidence of acute abdomen on exam.  Patient will be hydrated IV fluids.  Labs will be obtained.  I suspect patient will be admitted for IV fluids and pain control.  Clinical Tests:   Lab Tests: Ordered and Reviewed  ED Management:  11:53 AM  Pt is hypokalemic.  Potassium will be replaced.  Case is discussed with Dr. Tompkins to advised admission.  Case will be discussed with hospitalist.  Other:   I have discussed this case with another health care provider.       <> Summary of the Discussion: Dr. Carter                   ED Course     Clinical Impression:   The primary encounter diagnosis was Cancer. Diagnoses of Nausea and vomiting, intractability of vomiting not specified, unspecified vomiting type and Hypokalemia were also pertinent to this visit.          Shanelle Robles PA-C  02/02/17 1150

## 2017-02-02 NOTE — ED NOTES
"Pt presents to Ed with c/o epigastric pain and nausea x 2 days, reporting pain 10/10. Pt reporting she has active bone cancer and is receiving radiation therapy, had therapy today but pain has worsened. Pt reporting she did not take her PO pain medicine today "because I had to drive to my radiation appt." Pt AAOx4 and appropriate at this time. Respirations even and unlabored. . Awaiting further orders. Pt updated on POC. Bed is locked and in lowest position with side rails up x2. Call bell within reach and pt oriented to use of call bell. Pt on continuous pulse ox, and continuous BP cuff. Will continue to monitor.     "

## 2017-02-02 NOTE — PLAN OF CARE
Problem: Radiation, External Beam (Adult)  Goal: Signs and Symptoms of Listed Potential Problems Will be Absent, Minimized or Managed (Radiation, External Beam)  Signs and symptoms of listed potential problems will be absent, minimized or managed by discharge/transition of care (reference Radiation, External Beam (Adult) CPG).  Outcome: Ongoing (interventions implemented as appropriate)  Comes in for day 3 of radiation to the femur and t-spine with intractable pain, nausea and vomiting sent to Emergency room for admission

## 2017-02-03 PROBLEM — R11.10 INTRACTABLE VOMITING: Status: ACTIVE | Noted: 2017-01-01

## 2017-02-03 PROBLEM — R53.1 WEAKNESS: Status: ACTIVE | Noted: 2017-01-01

## 2017-02-03 NOTE — PLAN OF CARE
Problem: Patient Care Overview  Goal: Plan of Care Review  Outcome: Ongoing (interventions implemented as appropriate)  Recommendations  1. Continue regular diet   2. Add Boost breeze (ORANGE) with meals to prevent further unintentional weight loss   3. RD to monitor  Goals: PO intake >50% all meals and oral supplement  Nutrition Goal Status: new  Communication of RD Recs: other (comment)     Continuum of Care Plan  D/C planning: adequate intake to meet EEN

## 2017-02-03 NOTE — PLAN OF CARE
Discharge Planning:  Patient admitted on 2-2-17  LOS-day 1  Chart reviewed  Care plan discussed    Discussed care plan with treatment team  Discussed care plan with the attending Dr Chavis  Current dispo -pending  Case management  to follow  Consults following are: case mgt., oncology (Dr Carrero)

## 2017-02-03 NOTE — PLAN OF CARE
Problem: Patient Care Overview  Goal: Plan of Care Review  Outcome: Ongoing (interventions implemented as appropriate)  Patient free of falls or injury since arrival to floor.  Pain moderately controlled with prn medication.  Positions self independently.  IVF maintained.  Bed low and locked, side rails x 2, call bell in reach.  Family member at bedside.  No other complaints at this time.

## 2017-02-03 NOTE — PLAN OF CARE
02/03/17 1209   Discharge Assessment   Assessment Type Discharge Planning Assessment   Confirmed/corrected address and phone number on facesheet? Yes   Assessment information obtained from? Patient;Medical Record   Prior to hospitilization cognitive status: Alert/Oriented   Prior to hospitalization functional status: Assistive Equipment   Current cognitive status: Alert/Oriented   Current Functional Status: Assistive Equipment   Lives With child(konrad), adult   Able to Return to Prior Arrangements yes   Is patient able to care for self after discharge? Yes   How many people do you have in your home that can help with your care after discharge? 1   Patient currently receives home health services? Yes   Does the patient currently use HME? Yes   Equipment Currently Used at Home oxygen   Discharge Plan A Home with family   Discharge Plan B Home Health   Patient/Family In Agreement With Plan yes

## 2017-02-03 NOTE — PLAN OF CARE
Problem: Patient Care Overview  Goal: Plan of Care Review  Outcome: Ongoing (interventions implemented as appropriate)  Lying in a low bed on 3 L of oxygen via nc.  VSS.  Afebrile.  Repositions herself independently in bed.  Pain controlled with prn medication.  No falls or injuries.  Safety precautions ongoing and call light within reach.  Will continue to monitor.

## 2017-02-03 NOTE — CONSULTS
[facility]  Adult Nutrition  Consult Note    SUMMARY     Recommendations   1. Continue regular diet   2. Add Boost breeze (ORANGE) with meals to prevent further unintentional weight loss   3. RD to monitor  Goals: PO intake >50% all meals and oral supplement  Nutrition Goal Status: new  Communication of RD Recs: other (comment)    Continuum of Care Plan  D/C planning: adequate intake to meet EEN       Reason for Assessment  Reason for Assessment: physician consult  Diagnosis: cancer diagnosis/related complications  Relevent Medical History: reviewed   Interdisciplinary Rounds: did not attend    Nutrition Prescription Ordered  Current Diet Order: Regular  Oral Nutrition Supplement: none         Nutrition Risk Screen   Nutrition Risk Screen: unintentional loss of 10 lbs or more in the past 2 mos    Nutrition/Diet History  Patient Reported Diet/Restrictions/Preferences: general  Typical Food/Fluid Intake: poor appetite x several months  Food Preferences: no cultural or Nondenominational food preferences identified    Labs/Tests/Procedures/Meds  Pertinent Labs Reviewed: reviewed  Pertinent Medications Reviewed: reviewed B12, MVI, B6, senna docusate    Physical Findings  Overall Physical Appearance: overweight       Anthropometrics  Height (inches): 62.01 in  Weight Method: Bed Scale  Weight (kg): 72.1 kg  Ideal Body Weight (IBW), Female: 110.05 lb  % Ideal Body Weight, Female (lb): 144.43 lb  BMI (kg/m2): 29.07  BMI Grade: 25 - 29.9 - overweight  Usual Body Weight (UBW), k.45 kg   (obtained per chart review)  % Usual Body Weight: 95.56  % Weight Change: 5 kg (in 2 weeks)  Weight Loss: unintentional      Estimated/Assessed Needs  Weight Used For Calorie Calculations: 72.1 kg (158 lb 15.2 oz)   Height (cm): 157.5 cm  Energy Need Method: Kcal/kg  Estimated energy needs 1802-2163kcal/day  25 kcal/kg (kcal): 1802.5 and 30 kcal/kg (kcal): 2163   RMR ([place]-St. Jeor Equation): 1327.17  Weight Used For  Protein Calculations: 72.1 kg (158 lb 15.2 oz)  Protein Requirements: 87g/day  1.2 gm Protein (gm): 86.7  Fluid Need Method: RDA Method (1 ml per kcal or per MD)     Nutrition diagnosis:  Problem: Unintentional weight loss\  Etiology: cancer, nausea, decreased appetite reported by pt  Symptoms: unintentional 5% weight loss over one week  NEW    Monitor and Evaluation  Food and Nutrient Intake: energy intake, food and beverage intake  Food and Nutrient Adminstration: diet order  Anthropometric Measurements: weight, weight change  Biochemical Data, Medical Tests and Procedures: electrolyte and renal panel  Nutrition-Focused Physical Findings: overall appearance    Nutrition Risk    Level of Risk: moderate    Nutrition Follow-Up  Follow twice weekly       Assessment and Plan    No new Assessment & Plan notes have been filed under this hospital service since the last note was generated.  Service: Nutrition

## 2017-02-03 NOTE — PLAN OF CARE
ATTN: TEAM   DC PLANNING      NO NEEDS @ THIS TIME      PLEASE CONSULT RNCM OR SSW IF ANY ARISE     Matilde Choi RN  Case management 2/3/52128:22 PM  # [phone] (FAX) [phone]       02/03/17 1423   Discharge Assessment   Assessment Type Discharge Planning Reassessment

## 2017-02-04 PROBLEM — R11.2 INTRACTABLE VOMITING WITH NAUSEA: Status: ACTIVE | Noted: 2017-01-01

## 2017-02-04 NOTE — PLAN OF CARE
Problem: Patient Care Overview  Goal: Plan of Care Review  Outcome: Ongoing (interventions implemented as appropriate)  Patient free of falls or injury during shift. Pain moderately controlled with prn medication.Patient encouraged to urinate into hat to measure I&O, but states she always misses hat; patient encouraged to try and she verbalized understanding.  Positions self independently. IVF maintained. Bed low and locked, side rails x 2, call bell in reach.  Safety maintained.  No other complaints at this time.

## 2017-02-04 NOTE — PLAN OF CARE
Problem: Fall Risk (Adult)  Goal: Identify Related Risk Factors and Signs and Symptoms  Related risk factors and signs and symptoms are identified upon initiation of Human Response Clinical Practice Guideline (CPG)   Outcome: Ongoing (interventions implemented as appropriate)  Good saturation on nasal O2.

## 2017-02-04 NOTE — PLAN OF CARE
Problem: Patient Care Overview  Goal: Plan of Care Review  Outcome: Ongoing (interventions implemented as appropriate)  Lying in a low bed on 3 L of oxygen via nc.  VSS.  Afebrile.  C/O severe pain.  Medications given.  C/o feeling hot.  No fever.  Temp was 98.  Called maintenance to check room temp.  No falls or injuries.  Safety precautions ongoing and call light within reach.  Will continue to monitor.

## 2017-02-04 NOTE — SUBJECTIVE & OBJECTIVE
Past Medical History   Diagnosis Date    Cancer     Neuropathy        Past Surgical History   Procedure Laterality Date    Tonsillectomy       section      Tubal ligation         Review of patient's allergies indicates:  No Known Allergies    No current facility-administered medications on file prior to encounter.      Current Outpatient Prescriptions on File Prior to Encounter   Medication Sig    cyanocobalamin (VITAMIN B-12) 100 MCG tablet Take 100 mcg by mouth once daily.    gabapentin (NEURONTIN) 300 MG capsule Take 300 mg by mouth 3 (three) times daily.    morphine (MS CONTIN) 30 MG 12 hr tablet Take 1 tablet (30 mg total) by mouth every 12 (twelve) hours.    multivitamin (ONE DAILY MULTIVITAMIN) per tablet Take 1 tablet by mouth once daily.    nicotine (NICODERM CQ) 14 mg/24 hr Place 1 patch onto the skin once daily.    oxycodone (ROXICODONE) 10 mg Tab immediate release tablet Take 1.5 tablets (15 mg total) by mouth every 4 (four) hours as needed for Pain.    polyethylene glycol (GLYCOLAX) 17 gram PwPk Take 17 g by mouth once daily.    pyridoxine, vitamin B6, (VITAMIN B-6) 100 MG Tab Take 100 mg by mouth once daily.    senna-docusate 8.6-50 mg (PERICOLACE) 8.6-50 mg per tablet Take 1 tablet by mouth once daily.     Family History     None        Social History Main Topics    Smoking status: Current Every Day Smoker     Packs/day: 0.50     Years: 15.00     Types: Cigarettes    Smokeless tobacco: Not on file    Alcohol use No    Drug use: Not on file    Sexual activity: Yes     Review of Systems   Constitutional: Positive for activity change, appetite change and fatigue. Negative for chills, fever and unexpected weight change.   HENT: Negative for dental problem, ear pain and trouble swallowing.    Eyes: Negative.    Respiratory: Negative for cough, shortness of breath and wheezing.    Cardiovascular: Negative for chest pain and leg swelling.   Gastrointestinal: Positive for nausea and  vomiting. Negative for abdominal distention and diarrhea.   Endocrine: Negative for polydipsia and polyphagia.   Genitourinary: Negative for dysuria and flank pain.   Musculoskeletal: Negative for arthralgias, myalgias, neck pain and neck stiffness.   Skin: Negative for rash.   Allergic/Immunologic: Negative.    Neurological: Positive for weakness and light-headedness. Negative for syncope and headaches.   Psychiatric/Behavioral: Negative for agitation and behavioral problems.   All other systems reviewed and are negative.    Objective:     Vital Signs (Most Recent):  Temp: 99.1 °F (37.3 °C) (02/03/17 1517)  Pulse: (!) 111 (02/03/17 1517)  Resp: 18 (02/03/17 1517)  BP: 124/68 (02/03/17 1517)  SpO2: 95 % (02/03/17 1517) Vital Signs (24h Range):  Temp:  [98.5 °F (36.9 °C)-99.9 °F (37.7 °C)] 99.1 °F (37.3 °C)  Pulse:  [103-116] 111  Resp:  [18] 18  SpO2:  [78 %-97 %] 95 %  BP: (112-124)/(64-68) 124/68     Weight: 72.1 kg (159 lb)  Body mass index is 29.08 kg/(m^2).    Physical Exam   Constitutional: She appears well-developed and well-nourished.  Non-toxic appearance. She does not have a sickly appearance.   HENT:   Head: Normocephalic and atraumatic.   Right Ear: Hearing normal.   Left Ear: Hearing normal.   Mouth/Throat: Oropharynx is clear and moist and mucous membranes are normal.   Eyes: Conjunctivae and EOM are normal. Pupils are equal, round, and reactive to light.   Neck: Normal range of motion. Neck supple. Carotid bruit is not present. No Brudzinski's sign and no Kernig's sign noted.   Cardiovascular: Normal rate, regular rhythm, S1 normal, S2 normal, normal heart sounds, intact distal pulses and normal pulses.  Exam reveals no gallop and no S3.    No murmur heard.  Pulmonary/Chest: Breath sounds normal. No accessory muscle usage. No respiratory distress. She has no wheezes. She has no rales.   Abdominal: Soft. Normal appearance and bowel sounds are normal. She exhibits no distension and no pulsatile midline  mass. There is no hepatosplenomegaly. There is no tenderness.   Several sub q nodules with superficial tenderness   Musculoskeletal: Normal range of motion.   Neurological: She is alert. She has normal strength and normal reflexes. No cranial nerve deficit or sensory deficit. GCS eye subscore is 4. GCS verbal subscore is 5. GCS motor subscore is 6.   Skin: Skin is warm, dry and intact. No rash noted.   Psychiatric: She has a normal mood and affect. Her speech is normal and behavior is normal. Judgment and thought content normal. Cognition and memory are normal.   Nursing note and vitals reviewed.       Significant Labs:   Recent Lab Results       02/03/17  0523      Anion Gap 6(L)     Baso # 0.02     Basophil% 0.3     BUN, Bld 4(L)     Calcium 7.7(L)     Chloride 107     CO2 26     Creatinine 0.6     Differential Method Automated     eGFR if  >60     eGFR if non  >60  Comment:  Calculation used to obtain the estimated glomerular filtration  rate (eGFR) is the CKD-EPI equation. Since race is unknown   in our information system, the eGFR values for   -American and Non--American patients are given   for each creatinine result.       Eos # 0.0     Eosinophil% 0.4     Glucose 93     Gran # 6.0     Gran% 82.1(H)     Hematocrit 31.3(L)     Hemoglobin 9.5(L)     Lymph # 0.8(L)     Lymph% 11.2(L)     Magnesium 1.6     MCH 26.3(L)     MCHC 30.4(L)     MCV 87     Mono # 0.4     Mono% 5.5     MPV 10.1     Phosphorus 2.3(L)     Platelets 275     Potassium 3.7     RBC 3.61(L)     RDW 15.6(H)     Sodium 139     WBC 7.30             Significant Imaging: I have reviewed and interpreted all pertinent imaging results/findings within the past 24 hours.

## 2017-02-04 NOTE — H&P
[facility] Hospital Medicine  History & Physical    Patient Name: Kat Corley  MRN: [id number]  Admission Date: 2017  Attending Physician: Cheryl Chavis MD   Primary Care Provider: NAMITA Guerin         Patient information was obtained from patient, past medical records and ER records.     Subjective:     Principal Problem:Intractable vomiting    Chief Complaint:   Chief Complaint   Patient presents with    Emesis     Pt reports nausea and vomiting for the last few days. Pt states she has bone cancer and just finished radiation today.         HPI: Pt is a 46 yr old female with hx of newly diagnosed metastatic adenocarcinoma of unknown primary that was diagnosed upon admission for pancreatitis in 2017. Patient recently started radiation therapy. Patient states since being home over last 2 days she's felt very weak. Patient reports nausea and vomiting. Patient states she has loss of appetite. Patient states she is taking her at home pain medication which is not relieving her pain. Patient states she is hurting all over her body. Patient denies any fevers or chills. Patient reports extreme fatigue. Patient denies urinary symptoms. Patient reports any worsening shortness of breath. Patient states she does live with her daughter, but she does not have much help. Patient states she is not able to take care of herself.         Past Medical History   Diagnosis Date    Cancer     Neuropathy        Past Surgical History   Procedure Laterality Date    Tonsillectomy       section      Tubal ligation         Review of patient's allergies indicates:  No Known Allergies    No current facility-administered medications on file prior to encounter.      Current Outpatient Prescriptions on File Prior to Encounter   Medication Sig    cyanocobalamin (VITAMIN B-12) 100 MCG tablet Take 100 mcg by mouth once daily.    gabapentin (NEURONTIN) 300 MG capsule Take 300 mg by mouth 3  (three) times daily.    morphine (MS CONTIN) 30 MG 12 hr tablet Take 1 tablet (30 mg total) by mouth every 12 (twelve) hours.    multivitamin (ONE DAILY MULTIVITAMIN) per tablet Take 1 tablet by mouth once daily.    nicotine (NICODERM CQ) 14 mg/24 hr Place 1 patch onto the skin once daily.    oxycodone (ROXICODONE) 10 mg Tab immediate release tablet Take 1.5 tablets (15 mg total) by mouth every 4 (four) hours as needed for Pain.    polyethylene glycol (GLYCOLAX) 17 gram PwPk Take 17 g by mouth once daily.    pyridoxine, vitamin B6, (VITAMIN B-6) 100 MG Tab Take 100 mg by mouth once daily.    senna-docusate 8.6-50 mg (PERICOLACE) 8.6-50 mg per tablet Take 1 tablet by mouth once daily.     Family History     None        Social History Main Topics    Smoking status: Current Every Day Smoker     Packs/day: 0.50     Years: 15.00     Types: Cigarettes    Smokeless tobacco: Not on file    Alcohol use No    Drug use: Not on file    Sexual activity: Yes     Review of Systems   Constitutional: Positive for activity change, appetite change and fatigue. Negative for chills, fever and unexpected weight change.   HENT: Negative for dental problem, ear pain and trouble swallowing.    Eyes: Negative.    Respiratory: Negative for cough, shortness of breath and wheezing.    Cardiovascular: Negative for chest pain and leg swelling.   Gastrointestinal: Positive for nausea and vomiting. Negative for abdominal distention and diarrhea.   Endocrine: Negative for polydipsia and polyphagia.   Genitourinary: Negative for dysuria and flank pain.   Musculoskeletal: Negative for arthralgias, myalgias, neck pain and neck stiffness.   Skin: Negative for rash.   Allergic/Immunologic: Negative.    Neurological: Positive for weakness and light-headedness. Negative for syncope and headaches.   Psychiatric/Behavioral: Negative for agitation and behavioral problems.   All other systems reviewed and are negative.    Objective:     Vital Signs  (Most Recent):  Temp: 99.1 °F (37.3 °C) (02/03/17 1517)  Pulse: (!) 111 (02/03/17 1517)  Resp: 18 (02/03/17 1517)  BP: 124/68 (02/03/17 1517)  SpO2: 95 % (02/03/17 1517) Vital Signs (24h Range):  Temp:  [98.5 °F (36.9 °C)-99.9 °F (37.7 °C)] 99.1 °F (37.3 °C)  Pulse:  [103-116] 111  Resp:  [18] 18  SpO2:  [78 %-97 %] 95 %  BP: (112-124)/(64-68) 124/68     Weight: 72.1 kg (159 lb)  Body mass index is 29.08 kg/(m^2).    Physical Exam   Constitutional: She appears well-developed and well-nourished.  Non-toxic appearance. She does not have a sickly appearance.   HENT:   Head: Normocephalic and atraumatic.   Right Ear: Hearing normal.   Left Ear: Hearing normal.   Mouth/Throat: Oropharynx is clear and moist and mucous membranes are normal.   Eyes: Conjunctivae and EOM are normal. Pupils are equal, round, and reactive to light.   Neck: Normal range of motion. Neck supple. Carotid bruit is not present. No Brudzinski's sign and no Kernig's sign noted.   Cardiovascular: Normal rate, regular rhythm, S1 normal, S2 normal, normal heart sounds, intact distal pulses and normal pulses.  Exam reveals no gallop and no S3.    No murmur heard.  Pulmonary/Chest: Breath sounds normal. No accessory muscle usage. No respiratory distress. She has no wheezes. She has no rales.   Abdominal: Soft. Normal appearance and bowel sounds are normal. She exhibits no distension and no pulsatile midline mass. There is no hepatosplenomegaly. There is no tenderness.   Several sub q nodules with superficial tenderness   Musculoskeletal: Normal range of motion.   Neurological: She is alert. She has normal strength and normal reflexes. No cranial nerve deficit or sensory deficit. GCS eye subscore is 4. GCS verbal subscore is 5. GCS motor subscore is 6.   Skin: Skin is warm, dry and intact. No rash noted.   Psychiatric: She has a normal mood and affect. Her speech is normal and behavior is normal. Judgment and thought content normal. Cognition and memory are  normal.   Nursing note and vitals reviewed.       Significant Labs:   Recent Lab Results       02/03/17  0523      Anion Gap 6(L)     Baso # 0.02     Basophil% 0.3     BUN, Bld 4(L)     Calcium 7.7(L)     Chloride 107     CO2 26     Creatinine 0.6     Differential Method Automated     eGFR if  >60     eGFR if non  >60  Comment:  Calculation used to obtain the estimated glomerular filtration  rate (eGFR) is the CKD-EPI equation. Since race is unknown   in our information system, the eGFR values for   -American and Non--American patients are given   for each creatinine result.       Eos # 0.0     Eosinophil% 0.4     Glucose 93     Gran # 6.0     Gran% 82.1(H)     Hematocrit 31.3(L)     Hemoglobin 9.5(L)     Lymph # 0.8(L)     Lymph% 11.2(L)     Magnesium 1.6     MCH 26.3(L)     MCHC 30.4(L)     MCV 87     Mono # 0.4     Mono% 5.5     MPV 10.1     Phosphorus 2.3(L)     Platelets 275     Potassium 3.7     RBC 3.61(L)     RDW 15.6(H)     Sodium 139     WBC 7.30             Significant Imaging: I have reviewed and interpreted all pertinent imaging results/findings within the past 24 hours.    Assessment/Plan:     * Intractable vomiting  Iv fluids, antiemetics      Multiple lesions of metastatic malignancy  Including bone and soft tissue      Cancer  adenoca unknown primary.  Receiving RT.  Consult med onc      VTE Risk Mitigation         Ordered     enoxaparin injection 40 mg  Daily     Route:  Subcutaneous        02/02/17 1212     Medium Risk of VTE  Once      02/02/17 1212        Aston Amaya PA-C  Department of Hospital Medicine   [facility]

## 2017-02-04 NOTE — PLAN OF CARE
Problem: Patient Care Overview  Goal: Plan of Care Review  Outcome: Ongoing (interventions implemented as appropriate)  Patient received on 2.5 L NC titrated down to 2L. Will continue to monitor.

## 2017-02-04 NOTE — PROGRESS NOTES
[facility] Hospital Medicine  Progress Note    Patient Name: Kat Corley  MRN: [id number]  Patient Class: OP- Observation   Admission Date: 2/2/2017  Length of Stay: 0 days  Attending Physician: Cheryl Chavis MD  Primary Care Provider: NAMITA Guerin        Subjective:     Principal Problem:Weakness    HPI:  Pt is a 46 yr old female with hx of newly diagnosed metastatic adenocarcinoma of unknown primary that was diagnosed upon admission for pancreatitis in January 2017. Patient recently started radiation therapy. Patient states since being home over last 2 days she's felt very weak. Patient reports nausea and vomiting. Patient states she has loss of appetite. Patient states she is taking her at home pain medication which is not relieving her pain. Patient states she is hurting all over her body. Patient denies any fevers or chills. Patient reports extreme fatigue. Patient denies urinary symptoms. Patient reports any worsening shortness of breath. Patient states she does live with her daughter, but she does not have much help. Patient states she is not able to take care of herself.         Hospital Course:  Recurrent nausea/vomiting.  Yet to start chemo as primary not identified  No vomiting today though still nauseous    Patient was seen in the ED at [facility] on 1/8/17 complaining of left sided chest pain and 30 pound weight loss over the preceding 4 months. The bilirubin was 4.2, the Alk phos was 1927, the AST ws 146 and the ALT was 537. CT of the abdomen and pelvis on 1/8 and of the chest on 1/9 showed metastatic lesions throughout the visualized skeleton, bilateral adrenal masses, subcutaneous masses, fractures of the right 8 and 12th ribs, changes of pancreatitis including swollen appearance of the pancreas and dilated biliary and pancreatic ducts. The uterus is present and there is a left adnexal mass. She was admitted. Biopsy of a subcutaneous nodule on 1/10/17  reports adenocarcinoma positive for GCDP ( breast) and TTF-1 (lung or thyroid). On 1/12/17 she underwent upper GI EUS and ERCP. Biopsy of the left adrenal mass reports adenocarcinoma. A stent was placed across a stricture in the common bile duct. At present she complains of pain centering at L5 without radiation to the lower extremities.          Interval History:     Review of Systems   Constitutional: Positive for activity change, appetite change and fatigue. Negative for chills, fever and unexpected weight change.   HENT: Negative for dental problem, ear pain and trouble swallowing.    Eyes: Negative.    Respiratory: Negative for cough, shortness of breath and wheezing.    Cardiovascular: Negative for chest pain and leg swelling.   Gastrointestinal: Positive for nausea and vomiting. Negative for abdominal distention and diarrhea.   Endocrine: Negative for polydipsia and polyphagia.   Genitourinary: Negative for dysuria and flank pain.   Musculoskeletal: Negative for arthralgias, myalgias, neck pain and neck stiffness.   Skin: Negative for rash.   Allergic/Immunologic: Negative.    Neurological: Positive for weakness and light-headedness. Negative for syncope and headaches.   Psychiatric/Behavioral: Negative for agitation and behavioral problems.   All other systems reviewed and are negative.    Objective:     Vital Signs (Most Recent):  Temp: 98.2 °F (36.8 °C) (02/04/17 0705)  Pulse: 98 (02/04/17 1116)  Resp: 18 (02/04/17 1116)  BP: (!) 106/57 (02/04/17 0705)  SpO2: 97 % (02/04/17 1116) Vital Signs (24h Range):  Temp:  [97.9 °F (36.6 °C)-99.1 °F (37.3 °C)] 98.2 °F (36.8 °C)  Pulse:  [] 98  Resp:  [18] 18  SpO2:  [95 %-97 %] 97 %  BP: (106-125)/(57-70) 106/57     Weight: 72.1 kg (159 lb 1 oz)  Body mass index is 29.09 kg/(m^2).    Intake/Output Summary (Last 24 hours) at 02/04/17 1409  Last data filed at 02/04/17 1305   Gross per 24 hour   Intake          5360.09 ml   Output                0 ml   Net           5360.09 ml      Physical Exam   Constitutional: She appears well-developed and well-nourished.  Non-toxic appearance. She does not have a sickly appearance.   HENT:   Head: Normocephalic and atraumatic.   Right Ear: Hearing normal.   Left Ear: Hearing normal.   Mouth/Throat: Oropharynx is clear and moist and mucous membranes are normal.   Eyes: Conjunctivae and EOM are normal. Pupils are equal, round, and reactive to light.   Neck: Normal range of motion. Neck supple. Carotid bruit is not present. No Brudzinski's sign and no Kernig's sign noted.   Cardiovascular: Normal rate, regular rhythm, S1 normal, S2 normal, normal heart sounds, intact distal pulses and normal pulses.  Exam reveals no gallop and no S3.    No murmur heard.  Pulmonary/Chest: Breath sounds normal. No accessory muscle usage. No respiratory distress. She has no wheezes. She has no rales.   Abdominal: Soft. Normal appearance and bowel sounds are normal. She exhibits no distension and no pulsatile midline mass. There is no hepatosplenomegaly. There is no tenderness.   Musculoskeletal: Normal range of motion.   Neurological: She is alert. She has normal strength and normal reflexes. No cranial nerve deficit or sensory deficit. GCS eye subscore is 4. GCS verbal subscore is 5. GCS motor subscore is 6.   Skin: Skin is warm, dry and intact. No rash noted.   Psychiatric: She has a normal mood and affect. Her speech is normal and behavior is normal. Judgment and thought content normal. Cognition and memory are normal.   Nursing note and vitals reviewed.      Significant Labs: All pertinent labs within the past 24 hours have been reviewed.    Significant Imaging: I have reviewed and interpreted all pertinent imaging results/findings within the past 24 hours.    Assessment/Plan:      Thrombocytosis        Multiple lesions of metastatic malignancy  Including bone and soft tissue  Systemically ill.  May not be strong enough for chemo  Poor  prognosis      Intractable vomiting  Iv fluids, antiemetics      VTE Risk Mitigation         Ordered     enoxaparin injection 40 mg  Daily     Route:  Subcutaneous        02/02/17 1212     Medium Risk of VTE  Once      02/02/17 1212          Aston Amaya PA-C  Department of Hospital Medicine   [facility]

## 2017-02-04 NOTE — PLAN OF CARE
Problem: Patient Care Overview  Goal: Plan of Care Review  Outcome: Ongoing (interventions implemented as appropriate)  Patient free of falls or injury during shift. Pain well controlled with prn medication. Nausea well controlled with prn medication. Patient encouraged to urinate into hat to measure I&O, but states she always misses hat; patient encouraged to try and she verbalized understanding.  Positions self independently. IVF maintained. Bed low and locked, side rails x 2, call bell in reach. Safety maintained. No other complaints at this time.  Will continue to monitor.

## 2017-02-04 NOTE — SUBJECTIVE & OBJECTIVE
Interval History:     Review of Systems   Constitutional: Positive for activity change, appetite change and fatigue. Negative for chills, fever and unexpected weight change.   HENT: Negative for dental problem, ear pain and trouble swallowing.    Eyes: Negative.    Respiratory: Negative for cough, shortness of breath and wheezing.    Cardiovascular: Negative for chest pain and leg swelling.   Gastrointestinal: Positive for nausea and vomiting. Negative for abdominal distention and diarrhea.   Endocrine: Negative for polydipsia and polyphagia.   Genitourinary: Negative for dysuria and flank pain.   Musculoskeletal: Negative for arthralgias, myalgias, neck pain and neck stiffness.   Skin: Negative for rash.   Allergic/Immunologic: Negative.    Neurological: Positive for weakness and light-headedness. Negative for syncope and headaches.   Psychiatric/Behavioral: Negative for agitation and behavioral problems.   All other systems reviewed and are negative.    Objective:     Vital Signs (Most Recent):  Temp: 98.2 °F (36.8 °C) (02/04/17 0705)  Pulse: 98 (02/04/17 1116)  Resp: 18 (02/04/17 1116)  BP: (!) 106/57 (02/04/17 0705)  SpO2: 97 % (02/04/17 1116) Vital Signs (24h Range):  Temp:  [97.9 °F (36.6 °C)-99.1 °F (37.3 °C)] 98.2 °F (36.8 °C)  Pulse:  [] 98  Resp:  [18] 18  SpO2:  [95 %-97 %] 97 %  BP: (106-125)/(57-70) 106/57     Weight: 72.1 kg (159 lb 1 oz)  Body mass index is 29.09 kg/(m^2).    Intake/Output Summary (Last 24 hours) at 02/04/17 1409  Last data filed at 02/04/17 1305   Gross per 24 hour   Intake          5360.09 ml   Output                0 ml   Net          5360.09 ml      Physical Exam   Constitutional: She appears well-developed and well-nourished.  Non-toxic appearance. She does not have a sickly appearance.   HENT:   Head: Normocephalic and atraumatic.   Right Ear: Hearing normal.   Left Ear: Hearing normal.   Mouth/Throat: Oropharynx is clear and moist and mucous membranes are normal.   Eyes:  Conjunctivae and EOM are normal. Pupils are equal, round, and reactive to light.   Neck: Normal range of motion. Neck supple. Carotid bruit is not present. No Brudzinski's sign and no Kernig's sign noted.   Cardiovascular: Normal rate, regular rhythm, S1 normal, S2 normal, normal heart sounds, intact distal pulses and normal pulses.  Exam reveals no gallop and no S3.    No murmur heard.  Pulmonary/Chest: Breath sounds normal. No accessory muscle usage. No respiratory distress. She has no wheezes. She has no rales.   Abdominal: Soft. Normal appearance and bowel sounds are normal. She exhibits no distension and no pulsatile midline mass. There is no hepatosplenomegaly. There is no tenderness.   Musculoskeletal: Normal range of motion.   Neurological: She is alert. She has normal strength and normal reflexes. No cranial nerve deficit or sensory deficit. GCS eye subscore is 4. GCS verbal subscore is 5. GCS motor subscore is 6.   Skin: Skin is warm, dry and intact. No rash noted.   Psychiatric: She has a normal mood and affect. Her speech is normal and behavior is normal. Judgment and thought content normal. Cognition and memory are normal.   Nursing note and vitals reviewed.      Significant Labs: All pertinent labs within the past 24 hours have been reviewed.    Significant Imaging: I have reviewed and interpreted all pertinent imaging results/findings within the past 24 hours.

## 2017-02-04 NOTE — ASSESSMENT & PLAN NOTE
Including bone and soft tissue  Systemically ill.  May not be strong enough for chemo  Poor prognosis

## 2017-02-05 PROBLEM — R11.2 INTRACTABLE NAUSEA AND VOMITING: Status: ACTIVE | Noted: 2017-01-01

## 2017-02-05 NOTE — PROGRESS NOTES
[facility] Hospital Medicine  Progress Note    Patient Name: Kat Corley  MRN: [id number]  Patient Class: OP- Observation   Admission Date: 2/2/2017  Length of Stay: 0 days  Attending Physician: Cheryl Chavis MD  Primary Care Provider: NAMITA Guerin        Subjective:     Principal Problem:Weakness    HPI:  Pt is a 46 yr old female with hx of newly diagnosed metastatic adenocarcinoma of unknown primary that was diagnosed upon admission for pancreatitis in January 2017. Patient recently started radiation therapy. Patient states since being home over last 2 days she's felt very weak. Patient reports nausea and vomiting. Patient states she has loss of appetite. Patient states she is taking her at home pain medication which is not relieving her pain. Patient states she is hurting all over her body. Patient denies any fevers or chills. Patient reports extreme fatigue. Patient denies urinary symptoms. Patient reports any worsening shortness of breath. Patient states she does live with her daughter, but she does not have much help. Patient states she is not able to take care of herself.         Hospital Course:  Recurrent nausea/vomiting.  Yet to start chemo as primary not identified  No vomiting today though still nauseous    Patient was seen in the ED at [facility] on 1/8/17 complaining of left sided chest pain and 30 pound weight loss over the preceding 4 months. The bilirubin was 4.2, the Alk phos was 1927, the AST ws 146 and the ALT was 537. CT of the abdomen and pelvis on 1/8 and of the chest on 1/9 showed metastatic lesions throughout the visualized skeleton, bilateral adrenal masses, subcutaneous masses, fractures of the right 8 and 12th ribs, changes of pancreatitis including swollen appearance of the pancreas and dilated biliary and pancreatic ducts. The uterus is present and there is a left adnexal mass. She was admitted. Biopsy of a subcutaneous nodule on 1/10/17  reports adenocarcinoma positive for GCDP ( breast) and TTF-1 (lung or thyroid). On 1/12/17 she underwent upper GI EUS and ERCP. Biopsy of the left adrenal mass reports adenocarcinoma. A stent was placed across a stricture in the common bile duct. At present she complains of pain centering at L5 without radiation to the lower extremities.          Interval History:     Review of Systems   Constitutional: Positive for activity change, appetite change and fatigue. Negative for chills, fever and unexpected weight change.   HENT: Negative for dental problem, ear pain and trouble swallowing.    Eyes: Negative.    Respiratory: Negative for cough, shortness of breath and wheezing.    Cardiovascular: Negative for chest pain and leg swelling.   Gastrointestinal: Positive for nausea. Negative for abdominal distention, diarrhea and vomiting.   Endocrine: Negative for polydipsia and polyphagia.   Genitourinary: Negative for dysuria and flank pain.   Musculoskeletal: Negative for arthralgias, myalgias, neck pain and neck stiffness.   Skin: Negative for rash.   Allergic/Immunologic: Negative.    Neurological: Positive for weakness and light-headedness. Negative for syncope and headaches.   Psychiatric/Behavioral: Negative for agitation and behavioral problems.   All other systems reviewed and are negative.    Objective:     Vital Signs (Most Recent):  Temp: 98.4 °F (36.9 °C) (02/05/17 0705)  Pulse: 102 (02/05/17 0705)  Resp: 18 (02/05/17 1424)  BP: (!) 113/56 (02/05/17 0705)  SpO2: 95 % (02/05/17 1424) Vital Signs (24h Range):  Temp:  [98.3 °F (36.8 °C)-99.6 °F (37.6 °C)] 98.4 °F (36.9 °C)  Pulse:  [102-108] 102  Resp:  [18-20] 18  SpO2:  [93 %-98 %] 95 %  BP: ()/(55-61) 113/56     Weight: 72.1 kg (159 lb 1 oz)  Body mass index is 29.09 kg/(m^2).    Intake/Output Summary (Last 24 hours) at 02/05/17 1440  Last data filed at 02/05/17 0417   Gross per 24 hour   Intake             3732 ml   Output             1350 ml   Net              2382 ml      Physical Exam   Constitutional: She appears well-developed and well-nourished.  Non-toxic appearance. She does not have a sickly appearance.   HENT:   Head: Normocephalic and atraumatic.   Right Ear: Hearing normal.   Left Ear: Hearing normal.   Mouth/Throat: Oropharynx is clear and moist and mucous membranes are normal.   Eyes: Conjunctivae and EOM are normal. Pupils are equal, round, and reactive to light.   Neck: Normal range of motion. Neck supple. Carotid bruit is not present. No Brudzinski's sign and no Kernig's sign noted.   Cardiovascular: Normal rate, regular rhythm, S1 normal, S2 normal, normal heart sounds, intact distal pulses and normal pulses.  Exam reveals no gallop and no S3.    No murmur heard.  Pulmonary/Chest: Breath sounds normal. No accessory muscle usage. No respiratory distress. She has no wheezes. She has no rales.   Abdominal: Soft. Normal appearance and bowel sounds are normal. She exhibits no distension and no pulsatile midline mass. There is no hepatosplenomegaly. There is no tenderness.   Musculoskeletal: Normal range of motion.   Neurological: She is alert. She has normal strength and normal reflexes. No cranial nerve deficit or sensory deficit. GCS eye subscore is 4. GCS verbal subscore is 5. GCS motor subscore is 6.   Skin: Skin is warm, dry and intact. No rash noted.   Psychiatric: She has a normal mood and affect. Her speech is normal and behavior is normal. Judgment and thought content normal. Cognition and memory are normal.   Nursing note and vitals reviewed.      Significant Labs:   Recent Lab Results     None          Significant Imaging: I have reviewed and interpreted all pertinent imaging results/findings within the past 24 hours.    Assessment/Plan:      * Weakness        Multiple lesions of metastatic malignancy  Including bone and soft tissue  Systemically ill.  May not be strong enough for chemo  Poor prognosis      Cancer  adenoca unknown primary.   Receiving RT.  Consult med onc      Intractable vomiting with nausea  Iv fluids, antiemetics      VTE Risk Mitigation         Ordered     enoxaparin injection 40 mg  Daily     Route:  Subcutaneous        02/02/17 1212     Medium Risk of VTE  Once      02/02/17 1212          Aston Amaya PA-C  Department of Hospital Medicine   [facility]

## 2017-02-05 NOTE — SUBJECTIVE & OBJECTIVE
Interval History:     Review of Systems   Constitutional: Positive for activity change, appetite change and fatigue. Negative for chills, fever and unexpected weight change.   HENT: Negative for dental problem, ear pain and trouble swallowing.    Eyes: Negative.    Respiratory: Negative for cough, shortness of breath and wheezing.    Cardiovascular: Negative for chest pain and leg swelling.   Gastrointestinal: Positive for nausea. Negative for abdominal distention, diarrhea and vomiting.   Endocrine: Negative for polydipsia and polyphagia.   Genitourinary: Negative for dysuria and flank pain.   Musculoskeletal: Negative for arthralgias, myalgias, neck pain and neck stiffness.   Skin: Negative for rash.   Allergic/Immunologic: Negative.    Neurological: Positive for weakness and light-headedness. Negative for syncope and headaches.   Psychiatric/Behavioral: Negative for agitation and behavioral problems.   All other systems reviewed and are negative.    Objective:     Vital Signs (Most Recent):  Temp: 98.4 °F (36.9 °C) (02/05/17 0705)  Pulse: 102 (02/05/17 0705)  Resp: 18 (02/05/17 1424)  BP: (!) 113/56 (02/05/17 0705)  SpO2: 95 % (02/05/17 1424) Vital Signs (24h Range):  Temp:  [98.3 °F (36.8 °C)-99.6 °F (37.6 °C)] 98.4 °F (36.9 °C)  Pulse:  [102-108] 102  Resp:  [18-20] 18  SpO2:  [93 %-98 %] 95 %  BP: ()/(55-61) 113/56     Weight: 72.1 kg (159 lb 1 oz)  Body mass index is 29.09 kg/(m^2).    Intake/Output Summary (Last 24 hours) at 02/05/17 1440  Last data filed at 02/05/17 0417   Gross per 24 hour   Intake             3732 ml   Output             1350 ml   Net             2382 ml      Physical Exam   Constitutional: She appears well-developed and well-nourished.  Non-toxic appearance. She does not have a sickly appearance.   HENT:   Head: Normocephalic and atraumatic.   Right Ear: Hearing normal.   Left Ear: Hearing normal.   Mouth/Throat: Oropharynx is clear and moist and mucous membranes are normal.    Eyes: Conjunctivae and EOM are normal. Pupils are equal, round, and reactive to light.   Neck: Normal range of motion. Neck supple. Carotid bruit is not present. No Brudzinski's sign and no Kernig's sign noted.   Cardiovascular: Normal rate, regular rhythm, S1 normal, S2 normal, normal heart sounds, intact distal pulses and normal pulses.  Exam reveals no gallop and no S3.    No murmur heard.  Pulmonary/Chest: Breath sounds normal. No accessory muscle usage. No respiratory distress. She has no wheezes. She has no rales.   Abdominal: Soft. Normal appearance and bowel sounds are normal. She exhibits no distension and no pulsatile midline mass. There is no hepatosplenomegaly. There is no tenderness.   Musculoskeletal: Normal range of motion.   Neurological: She is alert. She has normal strength and normal reflexes. No cranial nerve deficit or sensory deficit. GCS eye subscore is 4. GCS verbal subscore is 5. GCS motor subscore is 6.   Skin: Skin is warm, dry and intact. No rash noted.   Psychiatric: She has a normal mood and affect. Her speech is normal and behavior is normal. Judgment and thought content normal. Cognition and memory are normal.   Nursing note and vitals reviewed.      Significant Labs:   Recent Lab Results     None          Significant Imaging: I have reviewed and interpreted all pertinent imaging results/findings within the past 24 hours.

## 2017-02-05 NOTE — PLAN OF CARE
Problem: Patient Care Overview  Goal: Plan of Care Review  Outcome: Ongoing (interventions implemented as appropriate)  Patient received on 2.5L NC SAT 95%, will continue to monitor.

## 2017-02-06 PROBLEM — C50.919 METASTATIC BREAST CANCER: Status: ACTIVE | Noted: 2017-01-01

## 2017-02-06 NOTE — ASSESSMENT & PLAN NOTE
Including bone and soft tissue  Systemically ill.  Recurrent nausea/vomiting, poor po intake  Seen by Med Onc today.  To receive infusaport here and start chemo ASAP  Continue hospital care until PO intake adequate  Poor prognosis

## 2017-02-06 NOTE — SUBJECTIVE & OBJECTIVE
Interval History:     Review of Systems   Constitutional: Positive for activity change, appetite change and fatigue. Negative for chills, fever and unexpected weight change.   HENT: Negative for dental problem, ear pain and trouble swallowing.    Eyes: Negative.    Respiratory: Negative for cough, shortness of breath and wheezing.    Cardiovascular: Negative for chest pain and leg swelling.   Gastrointestinal: Positive for nausea. Negative for abdominal distention, diarrhea and vomiting.   Endocrine: Negative for polydipsia and polyphagia.   Genitourinary: Negative for dysuria and flank pain.   Musculoskeletal: Negative for arthralgias, myalgias, neck pain and neck stiffness.   Skin: Negative for rash.   Allergic/Immunologic: Negative.    Neurological: Positive for weakness and light-headedness. Negative for syncope and headaches.   Psychiatric/Behavioral: Negative for agitation and behavioral problems.   All other systems reviewed and are negative.    Objective:     Vital Signs (Most Recent):  Temp: 98.7 °F (37.1 °C) (02/06/17 0705)  Pulse: 110 (02/06/17 1136)  Resp: 20 (02/06/17 1136)  BP: 104/60 (02/06/17 0705)  SpO2: (!) 91 % (02/06/17 1136) Vital Signs (24h Range):  Temp:  [98.1 °F (36.7 °C)-98.7 °F (37.1 °C)] 98.7 °F (37.1 °C)  Pulse:  [102-110] 110  Resp:  [18-20] 20  SpO2:  [91 %-95 %] 91 %  BP: (104-124)/(55-71) 104/60     Weight: 73.1 kg (161 lb 2.5 oz)  Body mass index is 29.48 kg/(m^2).    Intake/Output Summary (Last 24 hours) at 02/06/17 1507  Last data filed at 02/06/17 0346   Gross per 24 hour   Intake              480 ml   Output              800 ml   Net             -320 ml      Physical Exam   Constitutional: She appears well-developed and well-nourished.  Non-toxic appearance. She does not have a sickly appearance.   HENT:   Head: Normocephalic and atraumatic.   Right Ear: Hearing normal.   Left Ear: Hearing normal.   Mouth/Throat: Oropharynx is clear and moist and mucous membranes are normal.    Eyes: Conjunctivae and EOM are normal. Pupils are equal, round, and reactive to light.   Neck: Normal range of motion. Neck supple. Carotid bruit is not present. No Brudzinski's sign and no Kernig's sign noted.   Cardiovascular: Normal rate, regular rhythm, S1 normal, S2 normal, normal heart sounds, intact distal pulses and normal pulses.  Exam reveals no gallop and no S3.    No murmur heard.  Pulmonary/Chest: Breath sounds normal. No accessory muscle usage. No respiratory distress. She has no wheezes. She has no rales.   Abdominal: Soft. Normal appearance and bowel sounds are normal. She exhibits no distension and no pulsatile midline mass. There is no hepatosplenomegaly. There is no tenderness.   Musculoskeletal: Normal range of motion.   Neurological: She is alert. She has normal strength and normal reflexes. No cranial nerve deficit or sensory deficit. GCS eye subscore is 4. GCS verbal subscore is 5. GCS motor subscore is 6.   Skin: Skin is warm, dry and intact. No rash noted.   Psychiatric: She has a normal mood and affect. Her speech is normal and behavior is normal. Judgment and thought content normal. Cognition and memory are normal.   Nursing note and vitals reviewed.      Significant Labs: All pertinent labs within the past 24 hours have been reviewed.    Significant Imaging: I have reviewed and interpreted all pertinent imaging results/findings within the past 24 hours.

## 2017-02-06 NOTE — NURSING
Consult recalled to Dr. Carrero.  Spoke with Iqra who stated Dr. Carrero would see patient after 1300 today.

## 2017-02-06 NOTE — PROGRESS NOTES
[facility] Hospital Medicine  Progress Note    Patient Name: Kat Corley  MRN: [id number]  Patient Class: IP- Inpatient   Admission Date: 2/2/2017  Length of Stay: 1 days  Attending Physician: Cheryl Chavis MD  Primary Care Provider: NAMITA Guerin        Subjective:     Principal Problem:Weakness    HPI:  Pt is a 46 yr old female with hx of newly diagnosed metastatic adenocarcinoma of unknown primary that was diagnosed upon admission for pancreatitis in January 2017. Patient recently started radiation therapy. Patient states since being home over last 2 days she's felt very weak. Patient reports nausea and vomiting. Patient states she has loss of appetite. Patient states she is taking her at home pain medication which is not relieving her pain. Patient states she is hurting all over her body. Patient denies any fevers or chills. Patient reports extreme fatigue. Patient denies urinary symptoms. Patient reports any worsening shortness of breath. Patient states she does live with her daughter, but she does not have much help. Patient states she is not able to take care of herself.         Hospital Course:  Recurrent nausea/vomiting.  Yet to start chemo .  No vomiting today though still nauseous, poor intake  Pain not controlled      Patient was seen in the ED at [facility] on 1/8/17 complaining of left sided chest pain and 30 pound weight loss over the preceding 4 months. The bilirubin was 4.2, the Alk phos was 1927, the AST ws 146 and the ALT was 537. CT of the abdomen and pelvis on 1/8 and of the chest on 1/9 showed metastatic lesions throughout the visualized skeleton, bilateral adrenal masses, subcutaneous masses, fractures of the right 8 and 12th ribs, changes of pancreatitis including swollen appearance of the pancreas and dilated biliary and pancreatic ducts. The uterus is present and there is a left adnexal mass. She was admitted. Biopsy of a subcutaneous nodule  on 1/10/17 reports adenocarcinoma positive for GCDP ( breast) and TTF-1 (lung or thyroid). On 1/12/17 she underwent upper GI EUS and ERCP. Biopsy of the left adrenal mass reports adenocarcinoma. A stent was placed across a stricture in the common bile duct. At present she complains of pain centering at L5 without radiation to the lower extremities.          Interval History:     Review of Systems   Constitutional: Positive for activity change, appetite change and fatigue. Negative for chills, fever and unexpected weight change.   HENT: Negative for dental problem, ear pain and trouble swallowing.    Eyes: Negative.    Respiratory: Negative for cough, shortness of breath and wheezing.    Cardiovascular: Negative for chest pain and leg swelling.   Gastrointestinal: Positive for nausea. Negative for abdominal distention, diarrhea and vomiting.   Endocrine: Negative for polydipsia and polyphagia.   Genitourinary: Negative for dysuria and flank pain.   Musculoskeletal: Negative for arthralgias, myalgias, neck pain and neck stiffness.   Skin: Negative for rash.   Allergic/Immunologic: Negative.    Neurological: Positive for weakness and light-headedness. Negative for syncope and headaches.   Psychiatric/Behavioral: Negative for agitation and behavioral problems.   All other systems reviewed and are negative.    Objective:     Vital Signs (Most Recent):  Temp: 98.7 °F (37.1 °C) (02/06/17 0705)  Pulse: 110 (02/06/17 1136)  Resp: 20 (02/06/17 1136)  BP: 104/60 (02/06/17 0705)  SpO2: (!) 91 % (02/06/17 1136) Vital Signs (24h Range):  Temp:  [98.1 °F (36.7 °C)-98.7 °F (37.1 °C)] 98.7 °F (37.1 °C)  Pulse:  [102-110] 110  Resp:  [18-20] 20  SpO2:  [91 %-95 %] 91 %  BP: (104-124)/(55-71) 104/60     Weight: 73.1 kg (161 lb 2.5 oz)  Body mass index is 29.48 kg/(m^2).    Intake/Output Summary (Last 24 hours) at 02/06/17 1507  Last data filed at 02/06/17 0346   Gross per 24 hour   Intake              480 ml   Output              800  ml   Net             -320 ml      Physical Exam   Constitutional: She appears well-developed and well-nourished.  Non-toxic appearance. She does not have a sickly appearance.   HENT:   Head: Normocephalic and atraumatic.   Right Ear: Hearing normal.   Left Ear: Hearing normal.   Mouth/Throat: Oropharynx is clear and moist and mucous membranes are normal.   Eyes: Conjunctivae and EOM are normal. Pupils are equal, round, and reactive to light.   Neck: Normal range of motion. Neck supple. Carotid bruit is not present. No Brudzinski's sign and no Kernig's sign noted.   Cardiovascular: Normal rate, regular rhythm, S1 normal, S2 normal, normal heart sounds, intact distal pulses and normal pulses.  Exam reveals no gallop and no S3.    No murmur heard.  Pulmonary/Chest: Breath sounds normal. No accessory muscle usage. No respiratory distress. She has no wheezes. She has no rales.   Abdominal: Soft. Normal appearance and bowel sounds are normal. She exhibits no distension and no pulsatile midline mass. There is no hepatosplenomegaly. There is no tenderness.   Musculoskeletal: Normal range of motion.   Neurological: She is alert. She has normal strength and normal reflexes. No cranial nerve deficit or sensory deficit. GCS eye subscore is 4. GCS verbal subscore is 5. GCS motor subscore is 6.   Skin: Skin is warm, dry and intact. No rash noted.   Psychiatric: She has a normal mood and affect. Her speech is normal and behavior is normal. Judgment and thought content normal. Cognition and memory are normal.   Nursing note and vitals reviewed.      Significant Labs: All pertinent labs within the past 24 hours have been reviewed.    Significant Imaging: I have reviewed and interpreted all pertinent imaging results/findings within the past 24 hours.    Assessment/Plan:      Multiple lesions of metastatic malignancy  Including bone and soft tissue  Systemically ill.  Recurrent nausea/vomiting, poor po intake  Seen by Med Onc today.   To receive infusaport here and start chemo ASAP  Continue hospital care until PO intake adequate  Poor prognosis      Cancer  adenoca unknown primary.  Receiving RT.  Consult med onc      Intractable vomiting with nausea  Iv fluids, antiemetics      VTE Risk Mitigation         Ordered     enoxaparin injection 40 mg  Daily     Route:  Subcutaneous        02/02/17 1212     Medium Risk of VTE  Once      02/02/17 1212          Aston Amaya PA-C  Department of Hospital Medicine   [facility]

## 2017-02-06 NOTE — CONSULTS
Consult Note  Hematology/Oncology      Consult Requested By: Cheryl Chavis MD    Reason for Consult: Metastatic carcinoma    SUBJECTIVE:     History of Present Illness:  Patient is a 46 y.o. female presents with persistent nausea and vomiting on February 3.  She has recently been diagnosed with widely metastatic carcinoma after presenting with pancreatitis on January 8 [facility].  A needle biopsy of an abdominal wall nodule performed on January 10 showed metastatic adenocarcinoma which was GC DFP positive, ER and NH were negative and HER-2 was negative by FISH.  Cytokeratin 7 was positive.  Cytokeratin 20 was negative.  CDX2 and synaptophysin were negative.  TTE F-1 was equivocal.  Ki-67 was greater than 50%.    Her imaging has showed multiple osteolytic bone metastasis in the sacrum lumbar and thoracic spine, multiple ribs, bilateral adrenals.  Mammograms and ultrasound of the breast performed on January 27 showed multiple bilateral nodules in the breast most consistent with metastatic disease.  There was no dominant lesion noted.  All of her tumor markers have been elevated  at 7700, CVA at 5400 and CA-19-9 and 6000.    She has had hypercalcemia of malignancy and has received Zometa for that.    She has started palliative radiation therapy to the lumbar spine and has had 5 treatments thus far with significant improvement in her pain.  She will receive a total of 10 treatments.  Her nausea began after she had approximately 3 radiation therapies.  That is improved but she still taking in small amounts by mouth.      Continuous Infusions:   dextrose 5 % and 0.45 % NaCl with KCl 20 mEq 100 mL/hr at 02/06/17 1111     Scheduled Meds:   cyanocobalamin  250 mcg Oral Daily    enoxaparin  40 mg Subcutaneous Daily    fentaNYL  1 patch Transdermal Q72H    gabapentin  300 mg Oral TID    morphine  30 mg Oral Q12H    multivitamin  1 tablet Oral Daily    nicotine  1 patch Transdermal Daily     pyridoxine (vitamin B6)  100 mg Oral Daily    senna-docusate 8.6-50 mg  1 tablet Oral Daily     PRN Meds:acetaminophen, HYDROmorphone, ondansetron, oxycodone, promethazine (PHENERGAN) IVPB    Past Medical History   Diagnosis Date    Cancer     Neuropathy      Past Surgical History   Procedure Laterality Date    Tonsillectomy       section      Tubal ligation       History reviewed. No pertinent family history.  Social History   Substance Use Topics    Smoking status: Current Every Day Smoker     Packs/day: 0.50     Years: 15.00     Types: Cigarettes    Smokeless tobacco: None    Alcohol use No       Review of patient's allergies indicates:  No Known Allergies     Review of Systems:  Constitutional: no fever or chills, ECO  Respiratory: no cough or shortness of breath  Gastrointestinal: positive for nausea and vomiting  Behavioral/Psych: Anxiety secondary to her diagnosis    OBJECTIVE:     Vital Signs (Most Recent)  Temp: 98.7 °F (37.1 °C) (17 0705)  Pulse: 110 (17 1136)  Resp: 20 (17 1136)  BP: 104/60 (17 0705)  SpO2: (!) 91 % (17 1136)    Pain Assessment: 3    Vital Signs Range (Last 24H):  Temp:  [98.1 °F (36.7 °C)-98.7 °F (37.1 °C)]   Pulse:  [102-110]   Resp:  [18-20]   BP: (104-124)/(55-71)   SpO2:  [91 %-95 %]     Physical Exam:  General: well developed, well nourished, mild distress, mildly obese  HENT: Head:normocephalic, atraumatic. Ears:not examined. Nose: no discharge, no epistaxis. Throat: lips, mucosa, and tongue normal; teeth and gums normal.  Lungs:  normal respiratory effort and diminished breath sounds LLL  Cardiovascular: Heart: regular rate and rhythm. Chest Wall: not examined. Extremities: no cyanosis or edema, or clubbing. Pulses: not examined.  Breasts:  No nipple retraction or dimpling, Normal to palpation without dominant masses  Abdomen/Rectal: Abdomen: soft, non-tender non-distented; bowel sounds normal; no masses,  no organomegaly. Rectal:  not examined  Skin: Small subcutaneous nodule measuring 1/2 cm in the epigastric area which is tender    Laboratory:  CBC with Differential:  No results for input(s): WBC, NEUTROPCT, LYMPH, MONOPCT, EOSPCT, BASOPHIL, RBC, HCT, HGB, MCV, MCH, RDW, PLT, MPV in the last 24 hours.    Invalid input(s): MCMC  CMP:  No results for input(s): GLU, CALCIUM, ALBUMIN, PROT, NA, K, CO2, CL, BUN, CREATININE, ALKPHOS, ALT, AST, BILITOT in the last 24 hours.  BMP: No results for input(s): GLU, CALCIUM, NA, K, CO2, CL, BUN, CREATININE in the last 24 hours.  Tumor Markers: No results for input(s): PSA, CEA, , AFPTM, PV9881,  in the last 24 hours.    Invalid input(s): ALGTM    Diagnostic Results:  Labs: Reviewed  X-Ray: Reviewed  CT: Reviewed    ASSESSMENT/PLAN:     #1 widely metastatic carcinoma with bone, adrenal, and skin metastasis.  Immunohistochemical pattern most consistent with breast cancer  #2 cancer related pain secondary to bone metastasis, improved with radiation  #3 hypercalcemia of malignancy-controlled following Zometa    Plan: We'll consider scheduling Zofran and Phenergan in an alternating fashion.  Add dexamethasone 8 mg twice a day to improve nausea control.  It would be helpful to have a passport placed while she is hospitalized as we will want to start her chemotherapy as soon as possible after she has finished her radiation.  I will discuss with her primary oncologist Dr. Rasmussen..

## 2017-02-06 NOTE — PLAN OF CARE
02/06/17 1333   Discharge Reassessment   Assessment Type Discharge Planning Reassessment   Can the patient answer the patient profile reliably? Yes, cognitively intact   How does the patient rate their overall health at the present time? Fair   Describe the patient's ability to walk at the present time. No restrictions   How often would a person be available to care for the patient? Infrequently   Discharge Plan A Home with family   Change in patient condition or support system No   Patient choice form signed by patient/caregiver N/A   Explained to the the patient/caregiver why the discharge planned changed: Yes   Involved the patient/caregiver in establishing a new discharge plan: Yes

## 2017-02-06 NOTE — PLAN OF CARE
02/06/17 1334   Readmission Questionnaire   At the time of your discharge, did someone talk to you about what your health problems were? Yes   At the time of discharge, did someone talk to you about what to watch out for regarding worsening of your health problem? Yes   At the time of discharge, did someone talk to you about what to do if you experienced worsening of your health problem? Yes   At the time of discharge, did someone talk to you about which medication to take when you left the hospital and which ones to stop taking? Yes   At the time of discharge, did someone talk to you about when and where to follow up with a doctor after you left the hospital? Yes

## 2017-02-06 NOTE — PLAN OF CARE
Problem: Patient Care Overview  Goal: Plan of Care Review  Outcome: Ongoing (interventions implemented as appropriate)  Patient free of falls or injury during shift. Pain and nausea well controlled with prn medication. Patient encouraged to urinate into hat to measure I&O, but states she always misses hat; patient encouraged to try and she verbalized understanding. Positions self independently. Up in chair for much of afternoon.  Safety maintained.  Bed low and locked, side rails x 2, call bell in reach. Patient sitting comfortably in chair.  No other complaints at this time. Will continue to monitor.

## 2017-02-07 PROBLEM — E87.6 HYPOKALEMIA: Status: ACTIVE | Noted: 2017-01-01

## 2017-02-07 NOTE — PLAN OF CARE
Problem: Patient Care Overview  Goal: Plan of Care Review  Outcome: Ongoing (interventions implemented as appropriate)  Patient free of falls or injury during shift. Pain well controlled with prn medication.  Denies nausea. Patient encouraged to urinate into hat to measure I&O, occasionally made it into hat this shift; patient encouraged to keep trying and she verbalized understanding. PO intake encouraged.  Positions self independently.  Safety maintained. Bed low and locked, side rails x 2, call bell in reach. Patient resting comfortably in bed. No other complaints at this time. Will continue to monitor.

## 2017-02-07 NOTE — PLAN OF CARE
Problem: Patient Care Overview  Goal: Plan of Care Review  Outcome: Ongoing (interventions implemented as appropriate)  Patient on oxygen; prn aerosol treatments added to therapy. One prn given at 1118.

## 2017-02-07 NOTE — PLAN OF CARE
Patient Name: Kat Corley  MRN: [id number]  Admission Date: 2/2/2017  Attending Physician: Cheryl Chavis MD/Ortega King MD  Primary Care Provider: NAMITA Guerin    Discharge Planning:  Patient admitted on 2-2-17  LOS-day 5  Chart reviewed  Care plan discussed     Discussed care plan with treatment team  Discussed care plan with the attending Dr Chavis/Dr King  Current dispo -pending  Port placement (Tunneled Cath Placement with Port) pending  Case management to follow with you   Consults following are: case mgt., oncology (Dr Carrero)

## 2017-02-07 NOTE — PROGRESS NOTES
[facility] Hospital Medicine  Progress Note    Patient Name: Kat Corley  MRN: [id number]  Patient Class: IP- Inpatient   Admission Date: 2/2/2017  Length of Stay: 2 days  Attending Physician: Otrega King MD  Primary Care Provider: NAMITA Guerin        Subjective:     Principal Problem:Weakness    HPI:  Pt is a 46 yr old female with hx of newly diagnosed metastatic adenocarcinoma of unknown primary that was diagnosed upon admission for pancreatitis in January 2017. Patient recently started radiation therapy. Patient states since being home over last 2 days she's felt very weak. Patient reports nausea and vomiting. Patient states she has loss of appetite. Patient states she is taking her at home pain medication which is not relieving her pain. Patient states she is hurting all over her body. Patient denies any fevers or chills. Patient reports extreme fatigue. Patient denies urinary symptoms. Patient reports any worsening shortness of breath. Patient states she does live with her daughter, but she does not have much help. Patient states she is not able to take care of herself.    Patient was seen in the ED at [facility] on 1/8/17 complaining of left sided chest pain and 30 pound weight loss over the preceding 4 months. The bilirubin was 4.2, the Alk phos was 1927, the AST ws 146 and the ALT was 537. CT of the abdomen and pelvis on 1/8 and of the chest on 1/9 showed metastatic lesions throughout the visualized skeleton, bilateral adrenal masses, subcutaneous masses, fractures of the right 8 and 12th ribs, changes of pancreatitis including swollen appearance of the pancreas and dilated biliary and pancreatic ducts. The uterus is present and there is a left adnexal mass. She was admitted. Biopsy of a subcutaneous nodule on 1/10/17 reports adenocarcinoma positive for GCDP ( breast) and TTF-1 (lung or thyroid). On 1/12/17 she underwent upper GI EUS and ERCP. Biopsy of the  left adrenal mass reports adenocarcinoma. A stent was placed across a stricture in the common bile duct.         Hospital Course:  Patient with widely metastatic carcinoma with bone, adrenal, and skin metastasis most consistent with breast cancer admitted with pain, extreme fatigue, hypercalcemia of malignancy-controlled following Zometa; cancer related pain secondary to bone metastasis, improved with radiation, on fentanyl patch, LA and SA opiod, with IV for breakthrough pain    Interval History: still with pain, + nausea    Review of Systems   Constitutional: Positive for activity change, appetite change and fatigue. Negative for chills, fever and unexpected weight change.   HENT: Negative for dental problem, ear pain and trouble swallowing.    Eyes: Negative.    Respiratory: Negative for cough, shortness of breath and wheezing.    Cardiovascular: Negative for chest pain and leg swelling.   Gastrointestinal: Positive for nausea. Negative for abdominal distention, diarrhea and vomiting.   Endocrine: Negative for polydipsia and polyphagia.   Genitourinary: Negative for dysuria and flank pain.   Musculoskeletal: Negative for arthralgias, myalgias, neck pain and neck stiffness.   Skin: Negative for rash.   Allergic/Immunologic: Negative.    Neurological: Positive for weakness. Negative for syncope and headaches.   Psychiatric/Behavioral: Negative for agitation and behavioral problems.   All other systems reviewed and are negative.    Objective:     Vital Signs (Most Recent):  Temp: 98.7 °F (37.1 °C) (02/07/17 0738)  Pulse: 78 (02/07/17 1118)  Resp: 18 (02/07/17 1118)  BP: 112/64 (02/07/17 0738)  SpO2: 96 % (02/07/17 1118) Vital Signs (24h Range):  Temp:  [98.1 °F (36.7 °C)-98.8 °F (37.1 °C)] 98.7 °F (37.1 °C)  Pulse:  [] 78  Resp:  [18-20] 18  SpO2:  [89 %-98 %] 96 %  BP: (103-120)/(58-73) 112/64     Weight: 76.2 kg (167 lb 15.9 oz)  Body mass index is 30.73 kg/(m^2).    Intake/Output Summary (Last 24 hours) at  02/07/17 1434  Last data filed at 02/07/17 1303   Gross per 24 hour   Intake          4084.67 ml   Output              300 ml   Net          3784.67 ml      Physical Exam   Constitutional: She is oriented to person, place, and time. She appears well-developed. No distress.   HENT:   Head: Normocephalic and atraumatic.   Mouth/Throat: No oropharyngeal exudate.   Eyes: Conjunctivae are normal. Right eye exhibits no discharge. Left eye exhibits no discharge. No scleral icterus.   Neck: Normal range of motion. Neck supple.   Cardiovascular:   tachycardic   Pulmonary/Chest: Effort normal and breath sounds normal. No respiratory distress. She has no wheezes.   Abdominal: Soft. Bowel sounds are normal. She exhibits no distension. There is no tenderness.   Musculoskeletal: Normal range of motion. She exhibits no edema or tenderness.   Neurological: She is alert and oriented to person, place, and time. No cranial nerve deficit.   Skin: Skin is warm and dry. She is not diaphoretic.   Nursing note and vitals reviewed.      Significant Labs:   CBC:   Recent Labs  Lab 02/07/17  0433   WBC 3.05*   HGB 7.7*   HCT 25.2*        CMP:   Recent Labs  Lab 02/07/17  0433      K 3.3*      CO2 26   GLU 98   BUN 2*   CREATININE 0.5   CALCIUM 7.9*   PROT 5.2*   ALBUMIN 1.5*   BILITOT 0.6   ALKPHOS 139*   AST 16   ALT 10   ANIONGAP 7*   EGFRNONAA >60     All pertinent labs within the past 24 hours have been reviewed.    Significant Imaging: I have reviewed all pertinent imaging results/findings within the past 24 hours.    Assessment/Plan:      * Weakness  - related to metastatic cancer  - PT      Multiple lesions of metastatic malignancy  - including bone and soft tissue, systemically ill  - Recurrent nausea/vomiting, poor po intake  - plan for port per Onco   Continue hospital care until PO intake adequate  Poor prognosis      Cancer  Adenocarcinoma  unknown primary  - on XRT      Intractable vomiting with nausea  - cont  zofran, phenergan      VTE Risk Mitigation         Ordered     enoxaparin injection 40 mg  Daily     Route:  Subcutaneous        02/02/17 1212     Medium Risk of VTE  Once      02/02/17 1212          Gisel Rome PA-C  Department of [facility]   [facility]

## 2017-02-07 NOTE — PHYSICIAN QUERY
"PT Name: Kat Corley  MR #: [id number]  Physician Query Form - Nutrition Clarification   Reviewer Heidi Fish RN, [email or web address]     This form is a permanent document in the medical record.     Query Date: February 7, 2017  By submitting this query, we are merely seeking further clarification of documentation.. Please utilize your independent clinical judgment when addressing the question(s) below.    The Medical record reflects the following:   Indicators     Supporting Clinical Findings Location in Medical Record    Wt/ BMI/ Usual Body Weight     X Alb          TProt            Pre-Albumin Albumin=1.5 2/7-Labs   X Acute or Chronic illness Patient with widely metastatic carcinoma with bone, adrenal, and skin metastasis most consistent with breast cancer admitted with pain, extreme fatigue, hypercalcemia of malignancy  Recurrent nausea/vomiting, poor po intake 2/7-PN    % of estimated energy intake over a time frame from p.o., TF or TPN     X     Weight status over a time frame unintentional loss of 10 lbs or more in the past 2 mos  % Weight Change: 5 kg (in 2 weeks)  unintentional 5% weight loss over one week 2/7-RD Note        Subcutaneous fat and/or muscle loss      Reduced  strength      "Delayed wound healing:, "Failure to thrive"      "Fluid accumulation" or "Edema"      "Hypoalbuminemia"     X Other:  Oral Nutrition Supplement: Boost Breeze peach  2/7-RD Note     AND / ASPEN Clinical Characteristics (October 2011)  A minimum of two characteristics is recommended for diagnosing either moderate or severe malnutrition    Mild Malnutrition Moderate Malnutrition Severe Malnutrition    Energy Intake from p.o., TF or TPN. < 75% intake of estimated energy needs for less than 7 days. < 75% intake of estimated energy needs for greater than 7 days. < 50% intake of estimated energy needs for > 5 days   Weight Loss 1-2% in 1 month  5% in 3 months  7.5% in 6 months  10% in one year 1-2 % in 1 week  5% " in 1 month  7.5% in 3 months  10% in 6 months  20% in 1 year > 2% in 1 week  > 5% in 1 month  >7.5% in 3 months  >10% in 6 months  >20% in 1 year   Physical Findings None *Mild subcutaneous fat and/or muscle loss  *Mild fluid accumulation  *Stage II decubitus  *Surgical wound or non-healing wound *Mod subcutaneous fat and/or muscle loss  *Mod/severe fluid accumulation  *Stage III or IV decubitus  *Non-healing surgical wound     Provider, please specify the diagnosis or diagnoses associated with above clinical findings.                                [ ] Mild Protein-Calorie Malnutrition  [ ] Moderate Protein-Calorie Malnutrition  [ x] Severe Protein-CalorieMalnutrition  [ ] Cachexia  [ ] Anorexia  [ ] Other Nutritional Diagnosis (Specify) ____________________________________  [ ] Clinically Undetermined    Please document in your progress notes daily for the duration of treatment, until resolved, and include in your discharge summary.

## 2017-02-07 NOTE — SUBJECTIVE & OBJECTIVE
Interval History: still with pain, + nausea    Review of Systems   Constitutional: Positive for activity change, appetite change and fatigue. Negative for chills, fever and unexpected weight change.   HENT: Negative for dental problem, ear pain and trouble swallowing.    Eyes: Negative.    Respiratory: Negative for cough, shortness of breath and wheezing.    Cardiovascular: Negative for chest pain and leg swelling.   Gastrointestinal: Positive for nausea. Negative for abdominal distention, diarrhea and vomiting.   Endocrine: Negative for polydipsia and polyphagia.   Genitourinary: Negative for dysuria and flank pain.   Musculoskeletal: Negative for arthralgias, myalgias, neck pain and neck stiffness.   Skin: Negative for rash.   Allergic/Immunologic: Negative.    Neurological: Positive for weakness. Negative for syncope and headaches.   Psychiatric/Behavioral: Negative for agitation and behavioral problems.   All other systems reviewed and are negative.    Objective:     Vital Signs (Most Recent):  Temp: 98.7 °F (37.1 °C) (02/07/17 0738)  Pulse: 78 (02/07/17 1118)  Resp: 18 (02/07/17 1118)  BP: 112/64 (02/07/17 0738)  SpO2: 96 % (02/07/17 1118) Vital Signs (24h Range):  Temp:  [98.1 °F (36.7 °C)-98.8 °F (37.1 °C)] 98.7 °F (37.1 °C)  Pulse:  [] 78  Resp:  [18-20] 18  SpO2:  [89 %-98 %] 96 %  BP: (103-120)/(58-73) 112/64     Weight: 76.2 kg (167 lb 15.9 oz)  Body mass index is 30.73 kg/(m^2).    Intake/Output Summary (Last 24 hours) at 02/07/17 1434  Last data filed at 02/07/17 1303   Gross per 24 hour   Intake          4084.67 ml   Output              300 ml   Net          3784.67 ml      Physical Exam   Constitutional: She is oriented to person, place, and time. She appears well-developed. No distress.   HENT:   Head: Normocephalic and atraumatic.   Mouth/Throat: No oropharyngeal exudate.   Eyes: Conjunctivae are normal. Right eye exhibits no discharge. Left eye exhibits no discharge. No scleral icterus.    Neck: Normal range of motion. Neck supple.   Cardiovascular:   tachycardic   Pulmonary/Chest: Effort normal and breath sounds normal. No respiratory distress. She has no wheezes.   Abdominal: Soft. Bowel sounds are normal. She exhibits no distension. There is no tenderness.   Musculoskeletal: Normal range of motion. She exhibits no edema or tenderness.   Neurological: She is alert and oriented to person, place, and time. No cranial nerve deficit.   Skin: Skin is warm and dry. She is not diaphoretic.   Nursing note and vitals reviewed.      Significant Labs:   CBC:   Recent Labs  Lab 02/07/17  0433   WBC 3.05*   HGB 7.7*   HCT 25.2*        CMP:   Recent Labs  Lab 02/07/17  0433      K 3.3*      CO2 26   GLU 98   BUN 2*   CREATININE 0.5   CALCIUM 7.9*   PROT 5.2*   ALBUMIN 1.5*   BILITOT 0.6   ALKPHOS 139*   AST 16   ALT 10   ANIONGAP 7*   EGFRNONAA >60     All pertinent labs within the past 24 hours have been reviewed.    Significant Imaging: I have reviewed all pertinent imaging results/findings within the past 24 hours.

## 2017-02-07 NOTE — PLAN OF CARE
Problem: Patient Care Overview  Goal: Plan of Care Review  Outcome: Ongoing (interventions implemented as appropriate)  Good saturation on nasal o2.

## 2017-02-07 NOTE — PLAN OF CARE
02/07/17 1221   Discharge Reassessment   Assessment Type Discharge Planning Reassessment   Can the patient answer the patient profile reliably? Yes, cognitively intact   How does the patient rate their overall health at the present time? Fair   Describe the patient's ability to walk at the present time. No restrictions   How often would a person be available to care for the patient? Infrequently   Discharge Plan A Home with family   Discharge Plan B Home Health   Patient choice form signed by patient/caregiver N/A   Explained to the the patient/caregiver why the discharge planned changed: Yes   Involved the patient/caregiver in establishing a new discharge plan: Yes

## 2017-02-07 NOTE — PROGRESS NOTES
[facility]  Adult Nutrition  Consult Note    SUMMARY     Recommendations   1. Continue regular diet   2. Change Boost breeze flavor to orange per pt request   3. RD to monitor  Goals: PO intake >50% all meals and oral supplement  Nutrition Goal Status: goal met  Communication of RD Recs: other (comment)    Continuum of Care Plan  D/C planning: adequate intake to meet EEN       Reason for Assessment  Reason for Assessment: RD follow-up  Diagnosis: cancer diagnosis/related complications  Relevent Medical History: reviewed   Interdisciplinary Rounds: did not attend     Nutrition Prescription Ordered  Current Diet Order: Regular  Oral Nutrition Supplement: Boost Breeze peach     Nutrition Risk Screen   Nutrition Risk Screen: unintentional loss of 10 lbs or more in the past 2 mos    Nutrition/Diet History  Patient Reported Diet/Restrictions/Preferences: general  Typical Food/Fluid Intake: poor appetite x several months  Food Preferences: no cultural or Buddhist food preferences identified     Labs/Tests/Procedures/Meds  Pertinent Labs Reviewed: reviewed  Pertinent Medications Reviewed: reviewed       Physical Findings  Overall Physical Appearance: overweight     Anthropometrics  Height (inches): 62.01 in  Weight Method: Bed Scale  Weight (kg): 76.2 kg  Ideal Body Weight (IBW), Female: 110.05 lb  % Ideal Body Weight, Female (lb): 152.65 lb  BMI (kg/m2): 30.72  BMI Grade: 30 - 34.9- obesity - grade I  Usual Body Weight (UBW), k.45 kg  % Usual Body Weight: 95.56  % Weight Change: 5 kg (in 2 weeks)  Weight Loss: unintentional        Estimated/Assessed Needs  Weight Used For Calorie Calculations: 76.2 kg (167 lb 15.9 oz)   Height (cm): 157.5 cm  Energy Need Method: Kcal/kg  25 kcal/kg (kcal): 1905 and 30 kcal/kg (kcal): 2286   RMR (Hodgeman-St. Jeor Equation): 1368.13  Weight Used For Protein Calculations: 76.2 kg (167 lb 15.9 oz)  Protein Requirements: 87g/day  1.2 gm Protein (gm): 91.63  Fluid  Need Method: RDA Method (1 ml per kcal or per MD)        Nutrition diagnosis:  Problem: Unintentional weight loss\  Etiology: cancer, nausea, decreased appetite reported by pt  Symptoms: unintentional 5% weight loss over one week  NEW            Monitor and Evaluation  Food and Nutrient Intake: energy intake, food and beverage intake  Food and Nutrient Adminstration: diet order  Anthropometric Measurements: weight, weight change  Biochemical Data, Medical Tests and Procedures: electrolyte and renal panel  Nutrition-Focused Physical Findings: overall appearance    Nutrition Risk    Level of Risk: moderate    Nutrition Follow-Up    RD Follow-up?: Yes    Assessment and Plan    No new Assessment & Plan notes have been filed under this hospital service since the last note was generated.  Service: Nutrition

## 2017-02-07 NOTE — ASSESSMENT & PLAN NOTE
- including bone and soft tissue, systemically ill  - Recurrent nausea/vomiting, poor po intake  - plan for port per Onco   Continue hospital care until PO intake adequate  Poor prognosis

## 2017-02-07 NOTE — PLAN OF CARE
Problem: Patient Care Overview  Goal: Plan of Care Review  Outcome: Ongoing (interventions implemented as appropriate)  Patient free of falls or injury during shift. Pain and nausea well controlled with prn medication. Patient encouraged to urinate into hat to measure I&O, but states she always misses hat; patient encouraged to try and she verbalized understanding.  Positions self independently. Up in chair this morning. Safety maintained. Bed low and locked, side rails x 2, call bell in reach. Patient resting comfortably in bed. No other complaints at this time. Will continue to monitor.

## 2017-02-08 PROBLEM — E43 SEVERE PROTEIN-CALORIE MALNUTRITION: Status: ACTIVE | Noted: 2017-01-01

## 2017-02-08 NOTE — PROGRESS NOTES
[facility] Hospital Medicine  Progress Note    Patient Name: Kat Corley  MRN: [id number]  Patient Class: IP- Inpatient   Admission Date: 2/2/2017  Length of Stay: 3 days  Attending Physician: Ortega King MD  Primary Care Provider: NAMITA Guerin        Subjective:     Principal Problem:Weakness    HPI:  Pt is a 46 yr old female with hx of newly diagnosed metastatic adenocarcinoma of unknown primary that was diagnosed upon admission for pancreatitis in January 2017. Patient recently started radiation therapy. Patient states since being home over last 2 days she's felt very weak. Patient reports nausea and vomiting. Patient states she has loss of appetite. Patient states she is taking her at home pain medication which is not relieving her pain. Patient states she is hurting all over her body. Patient denies any fevers or chills. Patient reports extreme fatigue. Patient denies urinary symptoms. Patient reports any worsening shortness of breath. Patient states she does live with her daughter, but she does not have much help. Patient states she is not able to take care of herself.    Patient was seen in the ED at [facility] on 1/8/17 complaining of left sided chest pain and 30 pound weight loss over the preceding 4 months. The bilirubin was 4.2, the Alk phos was 1927, the AST ws 146 and the ALT was 537. CT of the abdomen and pelvis on 1/8 and of the chest on 1/9 showed metastatic lesions throughout the visualized skeleton, bilateral adrenal masses, subcutaneous masses, fractures of the right 8 and 12th ribs, changes of pancreatitis including swollen appearance of the pancreas and dilated biliary and pancreatic ducts. The uterus is present and there is a left adnexal mass. She was admitted. Biopsy of a subcutaneous nodule on 1/10/17 reports adenocarcinoma positive for GCDP ( breast) and TTF-1 (lung or thyroid). On 1/12/17 she underwent upper GI EUS and ERCP. Biopsy of the  left adrenal mass reports adenocarcinoma. A stent was placed across a stricture in the common bile duct.         Hospital Course:  Patient with widely metastatic carcinoma with bone, adrenal, and skin metastasis most consistent with breast cancer admitted with pain, extreme fatigue, hypercalcemia of malignancy -controlled following Zometa; cancer related pain secondary to bone metastasis, on XRT,  fentanyl patch, LA and SA opiod, with IV for breakthrough pain.     Interval History: N/V at this time    Review of Systems   Constitutional: Positive for activity change, appetite change and fatigue. Negative for chills, fever and unexpected weight change.   HENT: Negative for dental problem, ear pain and trouble swallowing.    Eyes: Negative.    Respiratory: Negative for cough, shortness of breath and wheezing.    Cardiovascular: Negative for chest pain and leg swelling.   Gastrointestinal: Positive for abdominal pain, nausea and vomiting. Negative for abdominal distention and diarrhea.   Endocrine: Negative for polydipsia and polyphagia.   Genitourinary: Negative for dysuria and flank pain.   Musculoskeletal: Negative for arthralgias, myalgias, neck pain and neck stiffness.   Skin: Negative for rash.   Allergic/Immunologic: Negative.    Neurological: Positive for weakness. Negative for syncope and headaches.   Psychiatric/Behavioral: Negative for agitation and behavioral problems.   All other systems reviewed and are negative.    Objective:     Vital Signs (Most Recent):  Temp: 99.4 °F (37.4 °C) (02/08/17 1100)  Pulse: 110 (02/08/17 1100)  Resp: 16 (02/08/17 1100)  BP: (!) 113/57 (02/08/17 1100)  SpO2: (!) 94 % (02/08/17 1100) Vital Signs (24h Range):  Temp:  [98.1 °F (36.7 °C)-99.4 °F (37.4 °C)] 99.4 °F (37.4 °C)  Pulse:  [106-118] 110  Resp:  [16-20] 16  SpO2:  [84 %-96 %] 94 %  BP: (107-123)/(57-75) 113/57     Weight: 78.4 kg (172 lb 13.5 oz)  Body mass index is 31.61 kg/(m^2).    Intake/Output Summary (Last 24  hours) at 02/08/17 1316  Last data filed at 02/08/17 1100   Gross per 24 hour   Intake          3152.67 ml   Output             1950 ml   Net          1202.67 ml      Physical Exam   Constitutional: She is oriented to person, place, and time. She appears well-developed. No distress.   HENT:   Head: Normocephalic and atraumatic.   Mouth/Throat: No oropharyngeal exudate.   Eyes: Conjunctivae are normal. Right eye exhibits no discharge. Left eye exhibits no discharge. No scleral icterus.   Neck: Normal range of motion. Neck supple.   Cardiovascular:   tachycardic   Pulmonary/Chest: Effort normal and breath sounds normal. No respiratory distress. She has no wheezes.   Abdominal: Soft. Bowel sounds are normal. She exhibits no distension. There is tenderness (mild TTP diffuse).   Musculoskeletal: Normal range of motion. She exhibits no edema or tenderness.   Neurological: She is alert and oriented to person, place, and time. No cranial nerve deficit.   Skin: Skin is warm and dry. She is not diaphoretic.   Nursing note and vitals reviewed.      Significant Labs:   CBC:   Recent Labs  Lab 02/07/17  0433   WBC 3.05*   HGB 7.7*   HCT 25.2*        CMP:   Recent Labs  Lab 02/07/17  0433      K 3.3*      CO2 26   GLU 98   BUN 2*   CREATININE 0.5   CALCIUM 7.9*   PROT 5.2*   ALBUMIN 1.5*   BILITOT 0.6   ALKPHOS 139*   AST 16   ALT 10   ANIONGAP 7*   EGFRNONAA >60     All pertinent labs within the past 24 hours have been reviewed.       Assessment/Plan:      * Weakness  - related to metastatic cancer  - PT      Thrombocytosis  - resolved      Multiple lesions of metastatic malignancy  - including bone and soft tissue, systemically ill  - Recurrent nausea/vomiting, poor po intake  - plan for port per Onco   Continue hospital care until PO intake adequate  Poor prognosis      Cancer  Adenocarcinoma  unknown primary  - on XRT      Intractable vomiting with nausea  - cont zofran, phenergan  - cont IVF's  - monitor  lytes      Hypokalemia  - replaced and monitor      Severe protein-calorie malnutrition  Nutrition recs      VTE Risk Mitigation         Ordered     enoxaparin injection 40 mg  Daily     Route:  Subcutaneous        02/02/17 1212     Medium Risk of VTE  Once      02/02/17 1212          Gisel Rome PA-C  Department of Hospital Medicine   [facility]

## 2017-02-08 NOTE — PLAN OF CARE
Problem: Patient Care Overview  Goal: Plan of Care Review  Outcome: Ongoing (interventions implemented as appropriate)  Good saturation on nasal O2, no PRN rx called for.

## 2017-02-08 NOTE — PLAN OF CARE
Problem: Physical Therapy Goal  Goal: Physical Therapy Goal  Outcome: Outcome(s) achieved Date Met:  02/08/17  PT evaluation completed. Please see progress note for details, POC, and recommendations.
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
## 2017-02-08 NOTE — SUBJECTIVE & OBJECTIVE
Interval History: N/V at this time    Review of Systems   Constitutional: Positive for activity change, appetite change and fatigue. Negative for chills, fever and unexpected weight change.   HENT: Negative for dental problem, ear pain and trouble swallowing.    Eyes: Negative.    Respiratory: Negative for cough, shortness of breath and wheezing.    Cardiovascular: Negative for chest pain and leg swelling.   Gastrointestinal: Positive for abdominal pain, nausea and vomiting. Negative for abdominal distention and diarrhea.   Endocrine: Negative for polydipsia and polyphagia.   Genitourinary: Negative for dysuria and flank pain.   Musculoskeletal: Negative for arthralgias, myalgias, neck pain and neck stiffness.   Skin: Negative for rash.   Allergic/Immunologic: Negative.    Neurological: Positive for weakness. Negative for syncope and headaches.   Psychiatric/Behavioral: Negative for agitation and behavioral problems.   All other systems reviewed and are negative.    Objective:     Vital Signs (Most Recent):  Temp: 99.4 °F (37.4 °C) (02/08/17 1100)  Pulse: 110 (02/08/17 1100)  Resp: 16 (02/08/17 1100)  BP: (!) 113/57 (02/08/17 1100)  SpO2: (!) 94 % (02/08/17 1100) Vital Signs (24h Range):  Temp:  [98.1 °F (36.7 °C)-99.4 °F (37.4 °C)] 99.4 °F (37.4 °C)  Pulse:  [106-118] 110  Resp:  [16-20] 16  SpO2:  [84 %-96 %] 94 %  BP: (107-123)/(57-75) 113/57     Weight: 78.4 kg (172 lb 13.5 oz)  Body mass index is 31.61 kg/(m^2).    Intake/Output Summary (Last 24 hours) at 02/08/17 1316  Last data filed at 02/08/17 1100   Gross per 24 hour   Intake          3152.67 ml   Output             1950 ml   Net          1202.67 ml      Physical Exam   Constitutional: She is oriented to person, place, and time. She appears well-developed. No distress.   HENT:   Head: Normocephalic and atraumatic.   Mouth/Throat: No oropharyngeal exudate.   Eyes: Conjunctivae are normal. Right eye exhibits no discharge. Left eye exhibits no discharge. No  scleral icterus.   Neck: Normal range of motion. Neck supple.   Cardiovascular:   tachycardic   Pulmonary/Chest: Effort normal and breath sounds normal. No respiratory distress. She has no wheezes.   Abdominal: Soft. Bowel sounds are normal. She exhibits no distension. There is tenderness (mild TTP diffuse).   Musculoskeletal: Normal range of motion. She exhibits no edema or tenderness.   Neurological: She is alert and oriented to person, place, and time. No cranial nerve deficit.   Skin: Skin is warm and dry. She is not diaphoretic.   Nursing note and vitals reviewed.      Significant Labs:   CBC:   Recent Labs  Lab 02/07/17  0433   WBC 3.05*   HGB 7.7*   HCT 25.2*        CMP:   Recent Labs  Lab 02/07/17  0433      K 3.3*      CO2 26   GLU 98   BUN 2*   CREATININE 0.5   CALCIUM 7.9*   PROT 5.2*   ALBUMIN 1.5*   BILITOT 0.6   ALKPHOS 139*   AST 16   ALT 10   ANIONGAP 7*   EGFRNONAA >60     All pertinent labs within the past 24 hours have been reviewed.

## 2017-02-08 NOTE — PROGRESS NOTES
Plan of care reviewed with patient. Call light and belongings within reach. Pt free from falls and injury throughout shift. VSS. Pain meds given PRN as ordered to help manage pain. Pt started on clear diet to help with nausea. Will continue to monitor.

## 2017-02-08 NOTE — PLAN OF CARE
Patient Name: Kat Corley  MRN: [id number]  Admission Date: 2/2/2017  Attending Physician: Cheryl Chavis MD/Ortega King MD  Primary Care Provider: ANMITA Guerin     Discharge Planning:  Patient admitted on 2-2-17  LOS-day 6  Chart reviewed  Care plan discussed      Discussed care plan with treatment team  Discussed care plan with the attending Dr Chavis/Dr King  Current dispo -pending- home soon   Port placement (Tunneled Cath Placement with Port) pending  Case management to follow with you   Consults following are: case mgt., oncology (Dr Carrero), PT

## 2017-02-09 NOTE — PLAN OF CARE
Problem: Patient Care Overview  Goal: Plan of Care Review  Outcome: Ongoing (interventions implemented as appropriate)  Patient on room air; placed on oxygen due to low saturations. No prn treatment needed at time of visit.

## 2017-02-09 NOTE — PROGRESS NOTES
[facility]  Adult Nutrition  Consult Note    SUMMARY     Recommendations  1. When medically able return to regular diet   2. Continue Boost Breeze   3. RD to monitor  Goals: tolerance of oral diet AEB no nausea, vomiting, weight loss  Nutrition Goal Status: new  Communication of RD Recs: other (comment)    Continuum of Care Plan  D/C planning: adequate intake to meet EEN       Reason for Assessment  Reason for Assessment: RD follow-up  Diagnosis: cancer diagnosis/related complications  Relevent Medical History: reviewed   Interdisciplinary Rounds: did not attend  General Information Comments: Pt placed on clear liquids yesterday to help manage nausea    Nutrition Prescription Ordered  Current Diet Order: Clear liquids  Oral Nutrition Supplement: Boost breeze orange        Nutrition Risk Screen   Nutrition Risk Screen: unintentional loss of 10 lbs or more in the past 2 mos    Nutrition/Diet History  Patient Reported Diet/Restrictions/Preferences: general  Typical Food/Fluid Intake: poor appetite x several months  Food Preferences: no cultural or Judaism food preferences identified  Factors Affecting Nutritional Intake: nausea/vomiting    Labs/Tests/Procedures/Meds  Pertinent Labs Reviewed: reviewed  Pertinent Medications Reviewed: reviewed    Physical Findings  Overall Physical Appearance: overweight    Anthropometrics  Height (inches): 62.01 in  Weight Method: Bed Scale  Weight (kg): 79.6 kg  Ideal Body Weight (IBW), Female: 110.05 lb  % Ideal Body Weight, Female (lb): 159.46 lb  BMI (kg/m2): 32.09  BMI Grade: 30 - 34.9- obesity - grade I  Usual Body Weight (UBW), k.45 kg  % Usual Body Weight: 95.56  % Weight Change: 5 kg (in 2 weeks)  Weight Loss: unintentional    Wt Readings from Last 3 Encounters:   17 79.6 kg (175 lb 7.8 oz)   17 75.3 kg (166 lb)   17 75.7 kg (166 lb 14.2 oz)       Estimated/Assessed Needs  Weight Used For Calorie Calculations: 79.6 kg (175 lb 7.8 oz)    Height (cm): 157.5 cm  Energy Need Method: Kcal/kg  25 kcal/kg (kcal): 1990 and 30 kcal/kg (kcal): 2388   RMR (Sauk-St. Jeor Equation): 1402.1  Weight Used For Protein Calculations: 79.6 kg (175 lb 7.8 oz)  Protein Requirements: 87g/day  1.2 gm Protein (gm): 95.72  Fluid Need Method: RDA Method (1 ml per kcal or per MD)       Nutrition diagnosis:  Problem: Unintentional weight loss  Etiology: cancer, nausea, decreased appetite reported by pt  Symptoms: unintentional 5% weight loss over one week  Status: improving    Monitor and Evaluation  Food and Nutrient Intake: energy intake, food and beverage intake  Food and Nutrient Adminstration: diet order  Anthropometric Measurements: weight, weight change  Biochemical Data, Medical Tests and Procedures: electrolyte and renal panel  Nutrition-Focused Physical Findings: overall appearance    Nutrition Risk    Level of Risk: moderate    Nutrition Follow-Up    RD Follow-up?: Yes    Assessment and Plan    No new Assessment & Plan notes have been filed under this hospital service since the last note was generated.  Service: Nutrition

## 2017-02-09 NOTE — SUBJECTIVE & OBJECTIVE
Interval History: + nausea, abd pain    Review of Systems   Constitutional: Positive for activity change, appetite change and fatigue. Negative for chills, fever and unexpected weight change.   HENT: Negative for dental problem, ear pain and trouble swallowing.    Eyes: Negative.    Respiratory: Negative for cough, shortness of breath and wheezing.    Cardiovascular: Negative for chest pain and leg swelling.   Gastrointestinal: Positive for abdominal pain, nausea and vomiting. Negative for abdominal distention and diarrhea.   Endocrine: Negative for polydipsia and polyphagia.   Genitourinary: Negative for dysuria and flank pain.   Musculoskeletal: Negative for arthralgias, myalgias, neck pain and neck stiffness.   Skin: Negative for rash.   Allergic/Immunologic: Negative.    Neurological: Positive for weakness. Negative for syncope and headaches.   Psychiatric/Behavioral: Negative for agitation and behavioral problems.   All other systems reviewed and are negative.    Objective:     Vital Signs (Most Recent):  Temp: 98.5 °F (36.9 °C) (02/09/17 1300)  Pulse: 110 (02/09/17 1300)  Resp: 18 (02/09/17 1300)  BP: (!) 110/56 (02/09/17 1300)  SpO2: (!) 93 % (02/09/17 1300) Vital Signs (24h Range):  Temp:  [98.2 °F (36.8 °C)-99 °F (37.2 °C)] 98.5 °F (36.9 °C)  Pulse:  [102-110] 110  Resp:  [18] 18  SpO2:  [92 %-94 %] 93 %  BP: ()/(54-66) 110/56     Weight: 79.6 kg (175 lb 7.8 oz)  Body mass index is 32.1 kg/(m^2).    Intake/Output Summary (Last 24 hours) at 02/09/17 1532  Last data filed at 02/09/17 0750   Gross per 24 hour   Intake             1300 ml   Output             1101 ml   Net              199 ml      Physical Exam   Constitutional: She is oriented to person, place, and time. She appears well-developed. No distress.   HENT:   Head: Normocephalic and atraumatic.   Mouth/Throat: No oropharyngeal exudate.   Eyes: Conjunctivae are normal. Right eye exhibits no discharge. Left eye exhibits no discharge. No scleral  icterus.   Neck: Normal range of motion. Neck supple.   Cardiovascular:   tachycardic   Pulmonary/Chest: Effort normal and breath sounds normal. No respiratory distress. She has no wheezes.   Abdominal: Soft. Bowel sounds are normal. She exhibits no distension. There is tenderness (mild TTP diffuse).   Musculoskeletal: Normal range of motion. She exhibits no edema or tenderness.   Neurological: She is alert and oriented to person, place, and time. No cranial nerve deficit.   Skin: Skin is warm and dry. She is not diaphoretic.   Nursing note and vitals reviewed.      Significant Labs:   BMP:   Recent Labs  Lab 02/09/17  0447   *      K 3.4*      CO2 28   BUN 2*   CREATININE 0.5   CALCIUM 7.6*   MG 1.3*      All pertinent labs within the past 24 hours have been reviewed.

## 2017-02-09 NOTE — PROGRESS NOTES
She still reports intermittent episodes of nausea and vomiting and is mainly taking clear liquids.  She also has some persisting pain in spite of multiple pain medications.    Have discussed her treatment plan with her attending oncology team and the plan will be to initiate chemotherapy with Taxol and carboplatin as an outpatient once she is discharged.    Port placement will facilitate her treatment and hopefully that can be done by interventional radiology this week.    Dexamethasone added today for nausea which should help.    Impression: Metastatic carcinoma of unknown primary-likely triple negative breast cancer with associated cancer related pain and nausea and vomiting which may be mediated by her radiation.    Recommendation: Continue meds as ordered.  Port placement this week.                                 I provided her with written information regarding carboplatin and Taxol.                                 Would increase her long-acting oral medication (from 30-60 mg) and try to wean her off the patch                                    as the use of  multiple long-acting medications will make her pain management very challenging.

## 2017-02-09 NOTE — PLAN OF CARE
Problem: Patient Care Overview  Goal: Plan of Care Review  Outcome: Ongoing (interventions implemented as appropriate)  Recommendations  1. When medically able return to regular diet   2. Continue Boost Breeze   3. RD to monitor  Goals: tolerance of oral diet AEB no nausea, vomiting, weight loss  Nutrition Goal Status: new  Communication of RD Recs: other (comment)     Continuum of Care Plan  D/C planning: adequate intake to meet EEN

## 2017-02-09 NOTE — PLAN OF CARE
Problem: Patient Care Overview  Goal: Plan of Care Review  Outcome: Ongoing (interventions implemented as appropriate)  Plan of care reviewed with patient. Call light and belongings within reach. Pt free from falls and injury throughout shift. VSS. Pain meds given PRN as ordered to help manage pain. Pt started on clear diet to help with nausea. Will continue to monitor.

## 2017-02-09 NOTE — ASSESSMENT & PLAN NOTE
- including bone and soft tissue, systemically ill  - Recurrent nausea/vomiting, poor po intake  - plan for port d/w IR possible tomorrow will hold lovenox   Continue hospital care until PO intake adequate  Poor prognosis

## 2017-02-09 NOTE — PROGRESS NOTES
[facility] Hospital Medicine  Progress Note    Patient Name: Kat Corley  MRN: [id number]  Patient Class: IP- Inpatient   Admission Date: 2/2/2017  Length of Stay: 4 days  Attending Physician: Ortega King MD  Primary Care Provider: NAMITA Guerin        Subjective:     Principal Problem:Weakness    HPI:  Pt is a 46 yr old female with hx of newly diagnosed metastatic adenocarcinoma of unknown primary that was diagnosed upon admission for pancreatitis in January 2017. Patient recently started radiation therapy. Patient states since being home over last 2 days she's felt very weak. Patient reports nausea and vomiting. Patient states she has loss of appetite. Patient states she is taking her at home pain medication which is not relieving her pain. Patient states she is hurting all over her body. Patient denies any fevers or chills. Patient reports extreme fatigue. Patient denies urinary symptoms. Patient reports any worsening shortness of breath. Patient states she does live with her daughter, but she does not have much help. Patient states she is not able to take care of herself.    Patient was seen in the ED at [facility] on 1/8/17 complaining of left sided chest pain and 30 pound weight loss over the preceding 4 months. The bilirubin was 4.2, the Alk phos was 1927, the AST ws 146 and the ALT was 537. CT of the abdomen and pelvis on 1/8 and of the chest on 1/9 showed metastatic lesions throughout the visualized skeleton, bilateral adrenal masses, subcutaneous masses, fractures of the right 8 and 12th ribs, changes of pancreatitis including swollen appearance of the pancreas and dilated biliary and pancreatic ducts. The uterus is present and there is a left adnexal mass. She was admitted. Biopsy of a subcutaneous nodule on 1/10/17 reports adenocarcinoma positive for GCDP ( breast) and TTF-1 (lung or thyroid). On 1/12/17 she underwent upper GI EUS and ERCP. Biopsy of the  left adrenal mass reports adenocarcinoma. A stent was placed across a stricture in the common bile duct.         Hospital Course:  Patient with widely metastatic carcinoma with bone, adrenal, and skin metastasis most consistent with breast cancer admitted with pain, extreme fatigue, hypercalcemia of malignancy -controlled following Zometa; cancer related pain secondary to bone metastasis, on XRT,  fentanyl patch, LA and SA opiod, with IV for breakthrough pain. Dexamethasone added    Interval History: + nausea, abd pain    Review of Systems   Constitutional: Positive for activity change, appetite change and fatigue. Negative for chills, fever and unexpected weight change.   HENT: Negative for dental problem, ear pain and trouble swallowing.    Eyes: Negative.    Respiratory: Negative for cough, shortness of breath and wheezing.    Cardiovascular: Negative for chest pain and leg swelling.   Gastrointestinal: Positive for abdominal pain, nausea and vomiting. Negative for abdominal distention and diarrhea.   Endocrine: Negative for polydipsia and polyphagia.   Genitourinary: Negative for dysuria and flank pain.   Musculoskeletal: Negative for arthralgias, myalgias, neck pain and neck stiffness.   Skin: Negative for rash.   Allergic/Immunologic: Negative.    Neurological: Positive for weakness. Negative for syncope and headaches.   Psychiatric/Behavioral: Negative for agitation and behavioral problems.   All other systems reviewed and are negative.    Objective:     Vital Signs (Most Recent):  Temp: 98.5 °F (36.9 °C) (02/09/17 1300)  Pulse: 110 (02/09/17 1300)  Resp: 18 (02/09/17 1300)  BP: (!) 110/56 (02/09/17 1300)  SpO2: (!) 93 % (02/09/17 1300) Vital Signs (24h Range):  Temp:  [98.2 °F (36.8 °C)-99 °F (37.2 °C)] 98.5 °F (36.9 °C)  Pulse:  [102-110] 110  Resp:  [18] 18  SpO2:  [92 %-94 %] 93 %  BP: ()/(54-66) 110/56     Weight: 79.6 kg (175 lb 7.8 oz)  Body mass index is 32.1 kg/(m^2).    Intake/Output Summary  (Last 24 hours) at 02/09/17 1532  Last data filed at 02/09/17 0750   Gross per 24 hour   Intake             1300 ml   Output             1101 ml   Net              199 ml      Physical Exam   Constitutional: She is oriented to person, place, and time. She appears well-developed. No distress.   HENT:   Head: Normocephalic and atraumatic.   Mouth/Throat: No oropharyngeal exudate.   Eyes: Conjunctivae are normal. Right eye exhibits no discharge. Left eye exhibits no discharge. No scleral icterus.   Neck: Normal range of motion. Neck supple.   Cardiovascular:   tachycardic   Pulmonary/Chest: Effort normal and breath sounds normal. No respiratory distress. She has no wheezes.   Abdominal: Soft. Bowel sounds are normal. She exhibits no distension. There is tenderness (mild TTP diffuse).   Musculoskeletal: Normal range of motion. She exhibits no edema or tenderness.   Neurological: She is alert and oriented to person, place, and time. No cranial nerve deficit.   Skin: Skin is warm and dry. She is not diaphoretic.   Nursing note and vitals reviewed.      Significant Labs:   BMP:   Recent Labs  Lab 02/09/17  0447   *      K 3.4*      CO2 28   BUN 2*   CREATININE 0.5   CALCIUM 7.6*   MG 1.3*      All pertinent labs within the past 24 hours have been reviewed.         Assessment/Plan:      * Weakness  - related to metastatic cancer  - PT      Multiple lesions of metastatic malignancy  - including bone and soft tissue, systemically ill  - Recurrent nausea/vomiting, poor po intake  - plan for port d/w IR possible tomorrow will hold lovenox   Continue hospital care until PO intake adequate  Poor prognosis      Cancer  Adenocarcinoma  unknown primary  - on XRT      Intractable vomiting with nausea  - cont zofran, phenergan  - cont IVF's  - add dexamethasone d/w Dr. Carrero      Hypokalemia  - replaced and monitor      Severe protein-calorie malnutrition  Nutrition recs      VTE Risk Mitigation         Ordered      Medium Risk of VTE  Once      02/02/17 1212          Gisel Rome PA-C  Department of Hospital Medicine   [facility]

## 2017-02-10 NOTE — ASSESSMENT & PLAN NOTE
- including bone and soft tissue, systemically ill  - Recurrent nausea/vomiting, poor po intake   Continue hospital care until PO intake adequate  - consult surg for port  Poor prognosis

## 2017-02-10 NOTE — PLAN OF CARE
Patient Name: Kat Corley  MRN: [id number]  Admission Date: 2/2/2017  Attending Physician: Cheryl Chavis MD/Ortega King MD  Primary Care Provider: NAMITA Guerin         Patient Name: aKt Corley  MRN: [id number]  Patient Class: IP- Inpatient   Admission Date: 2/2/2017  Length of Stay: 4 days  Attending Physician: Ortega King MD  Primary Care Provider: NAMITA Guerin      Discharge Planning:  Patient admitted on 2-2-17  Chart reviewed  Care plan discussed      Discussed care plan with treatment team  Discussed care plan with the attending Dr Chavis/Dr King  Current dispo -pending- home soon   Port placement (Tunneled Cath Placement with Port) pending  Case management to follow with you   Consults following are: case mgt., oncology (Dr Carrero), PT

## 2017-02-10 NOTE — PLAN OF CARE
02/10/17 1551   Discharge Reassessment   Assessment Type Discharge Planning Reassessment   Can the patient answer the patient profile reliably? Yes, cognitively intact   How does the patient rate their overall health at the present time? Fair   Describe the patient's ability to walk at the present time. Minor restrictions or changes   How often would a person be available to care for the patient? Infrequently   Number of comorbid conditions (as recorded on the chart) One   Discharge Plan A Home with family   Change in patient condition or support system No   Patient choice form signed by patient/caregiver N/A   Explained to the the patient/caregiver why the discharge planned changed: Yes   Involved the patient/caregiver in establishing a new discharge plan: Yes

## 2017-02-10 NOTE — SUBJECTIVE & OBJECTIVE
Interval History: pain better controlled, no emesis, hungry    Review of Systems   Constitutional: Positive for activity change, appetite change and fatigue. Negative for chills, fever and unexpected weight change.   HENT: Negative for dental problem, ear pain and trouble swallowing.    Eyes: Negative.    Respiratory: Negative for cough, shortness of breath and wheezing.    Cardiovascular: Negative for chest pain and leg swelling.   Gastrointestinal: Positive for abdominal pain. Negative for abdominal distention and diarrhea.   Endocrine: Negative for polydipsia and polyphagia.   Genitourinary: Negative for dysuria and flank pain.   Musculoskeletal: Negative for arthralgias, myalgias, neck pain and neck stiffness.   Skin: Negative for rash.   Allergic/Immunologic: Negative.    Neurological: Positive for weakness. Negative for syncope and headaches.   Psychiatric/Behavioral: Negative for agitation and behavioral problems.   All other systems reviewed and are negative.    Objective:     Vital Signs (Most Recent):  Temp: 97.7 °F (36.5 °C) (02/10/17 1200)  Pulse: 90 (02/10/17 1200)  Resp: 18 (02/10/17 1200)  BP: 104/64 (02/10/17 1200)  SpO2: 95 % (02/10/17 1200) Vital Signs (24h Range):  Temp:  [97.7 °F (36.5 °C)-99 °F (37.2 °C)] 97.7 °F (36.5 °C)  Pulse:  [] 90  Resp:  [17-18] 18  SpO2:  [88 %-95 %] 95 %  BP: (100-119)/(57-66) 104/64     Weight: 82.5 kg (181 lb 14.1 oz)  Body mass index is 33.27 kg/(m^2).    Intake/Output Summary (Last 24 hours) at 02/10/17 1447  Last data filed at 02/10/17 1400   Gross per 24 hour   Intake          3167.85 ml   Output             1700 ml   Net          1467.85 ml      Physical Exam   Constitutional: She is oriented to person, place, and time. She appears well-developed. No distress.   HENT:   Head: Normocephalic and atraumatic.   Mouth/Throat: No oropharyngeal exudate.   Eyes: Conjunctivae are normal. Right eye exhibits no discharge. Left eye exhibits no discharge. No scleral  icterus.   Neck: Normal range of motion. Neck supple.   Cardiovascular:   tachycardic   Pulmonary/Chest: Effort normal and breath sounds normal. No respiratory distress. She has no wheezes.   Abdominal: Soft. Bowel sounds are normal. She exhibits no distension. There is tenderness (mild TTP diffuse).   Musculoskeletal: Normal range of motion. She exhibits no edema or tenderness.   Lymphadenopathy:     She has cervical adenopathy.   Neurological: She is alert and oriented to person, place, and time. No cranial nerve deficit.   Skin: Skin is warm and dry. She is not diaphoretic.   Nursing note and vitals reviewed.      Significant Labs:   CMP:   Recent Labs  Lab 02/09/17  0447      K 3.4*      CO2 28   *   BUN 2*   CREATININE 0.5   CALCIUM 7.6*   ANIONGAP 6*   EGFRNONAA >60     All pertinent labs within the past 24 hours have been reviewed.

## 2017-02-10 NOTE — PROGRESS NOTES
[facility] Hospital Medicine  Progress Note    Patient Name: Kat Corley  MRN: [id number]  Patient Class: IP- Inpatient   Admission Date: 2/2/2017  Length of Stay: 5 days  Attending Physician: Ortega King MD  Primary Care Provider: NAMITA Guerin        Subjective:     Principal Problem:Weakness    HPI:  Pt is a 46 yr old female with hx of newly diagnosed metastatic adenocarcinoma of unknown primary that was diagnosed upon admission for pancreatitis in January 2017. Patient recently started radiation therapy. Patient states since being home over last 2 days she's felt very weak. Patient reports nausea and vomiting. Patient states she has loss of appetite. Patient states she is taking her at home pain medication which is not relieving her pain. Patient states she is hurting all over her body. Patient denies any fevers or chills. Patient reports extreme fatigue. Patient denies urinary symptoms. Patient reports any worsening shortness of breath. Patient states she does live with her daughter, but she does not have much help. Patient states she is not able to take care of herself.    Patient was seen in the ED at [facility] on 1/8/17 complaining of left sided chest pain and 30 pound weight loss over the preceding 4 months. The bilirubin was 4.2, the Alk phos was 1927, the AST ws 146 and the ALT was 537. CT of the abdomen and pelvis on 1/8 and of the chest on 1/9 showed metastatic lesions throughout the visualized skeleton, bilateral adrenal masses, subcutaneous masses, fractures of the right 8 and 12th ribs, changes of pancreatitis including swollen appearance of the pancreas and dilated biliary and pancreatic ducts. The uterus is present and there is a left adnexal mass. She was admitted. Biopsy of a subcutaneous nodule on 1/10/17 reports adenocarcinoma positive for GCDP ( breast) and TTF-1 (lung or thyroid). On 1/12/17 she underwent upper GI EUS and ERCP. Biopsy of the  left adrenal mass reports adenocarcinoma. A stent was placed across a stricture in the common bile duct.         Hospital Course:  Patient with widely metastatic carcinoma with bone, adrenal, and skin metastasis most consistent with breast cancer admitted with pain, extreme fatigue, hypercalcemia of malignancy -controlled following Zometa; cancer related pain secondary to bone metastasis, on XRT,  fentanyl patch, LA and SA opiod, with IV for breakthrough pain. Dexamethasone added.     Interval History: pain better controlled, no emesis, hungry    Review of Systems   Constitutional: Positive for activity change, appetite change and fatigue. Negative for chills, fever and unexpected weight change.   HENT: Negative for dental problem, ear pain and trouble swallowing.    Eyes: Negative.    Respiratory: Negative for cough, shortness of breath and wheezing.    Cardiovascular: Negative for chest pain and leg swelling.   Gastrointestinal: Positive for abdominal pain. Negative for abdominal distention and diarrhea.   Endocrine: Negative for polydipsia and polyphagia.   Genitourinary: Negative for dysuria and flank pain.   Musculoskeletal: Negative for arthralgias, myalgias, neck pain and neck stiffness.   Skin: Negative for rash.   Allergic/Immunologic: Negative.    Neurological: Positive for weakness. Negative for syncope and headaches.   Psychiatric/Behavioral: Negative for agitation and behavioral problems.   All other systems reviewed and are negative.    Objective:     Vital Signs (Most Recent):  Temp: 97.7 °F (36.5 °C) (02/10/17 1200)  Pulse: 90 (02/10/17 1200)  Resp: 18 (02/10/17 1200)  BP: 104/64 (02/10/17 1200)  SpO2: 95 % (02/10/17 1200) Vital Signs (24h Range):  Temp:  [97.7 °F (36.5 °C)-99 °F (37.2 °C)] 97.7 °F (36.5 °C)  Pulse:  [] 90  Resp:  [17-18] 18  SpO2:  [88 %-95 %] 95 %  BP: (100-119)/(57-66) 104/64     Weight: 82.5 kg (181 lb 14.1 oz)  Body mass index is 33.27 kg/(m^2).    Intake/Output Summary  (Last 24 hours) at 02/10/17 1447  Last data filed at 02/10/17 1400   Gross per 24 hour   Intake          3167.85 ml   Output             1700 ml   Net          1467.85 ml      Physical Exam   Constitutional: She is oriented to person, place, and time. She appears well-developed. No distress.   HENT:   Head: Normocephalic and atraumatic.   Mouth/Throat: No oropharyngeal exudate.   Eyes: Conjunctivae are normal. Right eye exhibits no discharge. Left eye exhibits no discharge. No scleral icterus.   Neck: Normal range of motion. Neck supple.   Cardiovascular:   tachycardic   Pulmonary/Chest: Effort normal and breath sounds normal. No respiratory distress. She has no wheezes.   Abdominal: Soft. Bowel sounds are normal. She exhibits no distension. There is tenderness (mild TTP diffuse).   Musculoskeletal: Normal range of motion. She exhibits no edema or tenderness.   Lymphadenopathy:     She has cervical adenopathy.   Neurological: She is alert and oriented to person, place, and time. No cranial nerve deficit.   Skin: Skin is warm and dry. She is not diaphoretic.   Nursing note and vitals reviewed.      Significant Labs:   CMP:   Recent Labs  Lab 02/09/17  0447      K 3.4*      CO2 28   *   BUN 2*   CREATININE 0.5   CALCIUM 7.6*   ANIONGAP 6*   EGFRNONAA >60     All pertinent labs within the past 24 hours have been reviewed.        Assessment/Plan:      Thrombocytosis  - resolved      Multiple lesions of metastatic malignancy  - including bone and soft tissue, systemically ill  - Recurrent nausea/vomiting, poor po intake   Continue hospital care until PO intake adequate  - consult surg for port  Poor prognosis      Cancer  Adenocarcinoma  unknown primary  - on XRT  - Heme/ onco recs for outpt chemo      Intractable vomiting with nausea  - cont zofran, phenergan  - added dexamethasone  - improving      Severe protein-calorie malnutrition  Nutrition recs      VTE Risk Mitigation         Ordered      heparin (porcine) injection 5,000 Units  Every 8 hours     Route:  Subcutaneous        02/09/17 1547     Medium Risk of VTE  Once      02/02/17 1212        DC planning    Gisel Rome PA-C  Department of Hospital Medicine   [facility]

## 2017-02-10 NOTE — PLAN OF CARE
Problem: Patient Care Overview  Goal: Plan of Care Review  Outcome: Ongoing (interventions implemented as appropriate)  Patient received on 2.5L NC, SAT 95%. No PRN TX needed at this time. Will continue to monitor.

## 2017-02-10 NOTE — PLAN OF CARE
Problem: Patient Care Overview  Goal: Plan of Care Review  Outcome: Ongoing (interventions implemented as appropriate)  Pt free of trauma, falls, and injury. Pt VSS and afebrile throughout shift. Pt free of skin breakdown. Pt pain has been moderately controlled by IV and PO pain meds and tolerated well. Pt has been eating and voiding adequately throughout shift. Pt has call light in reach, bed alarm on, bed brakes on, side rails up x2, bed in low position, TEDs/SCDs on, and nonskid socks on. Pt is resting comfortably in bed in no distress. Will continue to monitor.

## 2017-02-11 NOTE — PLAN OF CARE
Problem: Patient Care Overview  Goal: Plan of Care Review  Outcome: Ongoing (interventions implemented as appropriate)  Good saturation on nasal O2, no PRN rx required.

## 2017-02-11 NOTE — PROGRESS NOTES
Discharge instruction given and explained to patient; verbalized understanding. Paper scripts for medications in folder along with discharge instructions. IV removed and transport requested. Will continue to monitor until transport arrives.

## 2017-02-11 NOTE — ASSESSMENT & PLAN NOTE
- including bone and soft tissue, systemically ill  - Recurrent nausea/vomiting, poor po intake   Continue hospital care until PO intake adequate  - port as outpt  Poor prognosis

## 2017-02-11 NOTE — DISCHARGE SUMMARY
[facility] Hospital Medicine  Discharge Summary      Patient Name: Kat Corley  MRN: [id number]  Admission Date: 2/2/2017  Hospital Length of Stay: 6 days  Discharge Date and Time:  02/11/2017 1:08 PM  Attending Physician: Ortega King MD   Discharging Provider: Gisel Rome PA-C  Primary Care Provider: NAMITA Guerin      HPI:   Pt is a 46 yr old female with hx of newly diagnosed metastatic adenocarcinoma of unknown primary that was diagnosed upon admission for pancreatitis in January 2017. Patient recently started radiation therapy. Patient states since being home over last 2 days she's felt very weak. Patient reports nausea and vomiting. Patient states she has loss of appetite. Patient states she is taking her at home pain medication which is not relieving her pain. Patient states she is hurting all over her body. Patient denies any fevers or chills. Patient reports extreme fatigue. Patient denies urinary symptoms. Patient reports any worsening shortness of breath. Patient states she does live with her daughter, but she does not have much help. Patient states she is not able to take care of herself.    Patient was seen in the ED at [facility] on 1/8/17 complaining of left sided chest pain and 30 pound weight loss over the preceding 4 months. The bilirubin was 4.2, the Alk phos was 1927, the AST ws 146 and the ALT was 537. CT of the abdomen and pelvis on 1/8 and of the chest on 1/9 showed metastatic lesions throughout the visualized skeleton, bilateral adrenal masses, subcutaneous masses, fractures of the right 8 and 12th ribs, changes of pancreatitis including swollen appearance of the pancreas and dilated biliary and pancreatic ducts. The uterus is present and there is a left adnexal mass. She was admitted. Biopsy of a subcutaneous nodule on 1/10/17 reports adenocarcinoma positive for GCDP ( breast) and TTF-1 (lung or thyroid). On 1/12/17 she underwent upper GI  EUS and ERCP. Biopsy of the left adrenal mass reports adenocarcinoma. A stent was placed across a stricture in the common bile duct.         * No surgery found *      Indwelling Lines/Drains at time of discharge:   Lines/Drains/Airways          No matching active lines, drains, or airways        Hospital Course:   Patient with widely metastatic carcinoma with bone, adrenal, and skin metastasis most consistent with breast cancer admitted with pain, extreme fatigue, hypercalcemia of malignancy -controlled following Zometa; cancer related pain secondary to bone metastasis, on XRT,  fentanyl patch, LA and SA opiod, with IV for breakthrough pain. Dexamethasone added. Tolerating po, pain better controlled. Plan for port as outpatient; declines home health.      Consults:   Consults         Status Ordering Provider     Inpatient consult to General Surgery  Once     Provider:  Charlei Olmedo Jr., MD    Completed MARC GAN     Inpatient consult to Oncology - Medical  Once     Provider:  Charlie Carrero MD    Completed BENNY BURTON     Inpatient consult to Social Work  Once     Provider:  (Not yet assigned)    Acknowledged MARC GAN     IP consult to dietary  Once     Provider:  (Not yet assigned)    Completed BENNY BURTON          Significant Diagnostic Studies: Labs:   BMP:   Recent Labs  Lab 02/11/17  0435   *      K 4.2      CO2 24   BUN 4*   CREATININE 0.5   CALCIUM 7.7*   MG 1.6        Pending Diagnostic Studies:     None        Final Active Diagnoses:    Diagnosis Date Noted POA    PRINCIPAL PROBLEM:  Weakness [R53.1] 02/03/2017 Yes    Severe protein-calorie malnutrition [E43] 02/08/2017 Yes    Hypokalemia [E87.6] 02/07/2017 Yes    Intractable nausea and vomiting [R11.2] 02/05/2017 Yes    Intractable vomiting with nausea [R11.2] 02/03/2017 Yes    Cancer [C80.1] 02/02/2017 Yes    Multiple lesions of metastatic malignancy [C79.9] 01/10/2017 Yes    Thrombocytosis [D47.3]  01/09/2017 Yes      Problems Resolved During this Admission:    Diagnosis Date Noted Date Resolved POA      * Weakness  - related to metastatic cancer       Thrombocytosis  - resolved      Multiple lesions of metastatic malignancy  - including bone and soft tissue, systemically ill  - Recurrent nausea/vomiting, poor po intake   Continue hospital care until PO intake adequate  - port as outpt  Poor prognosis      Cancer  Adenocarcinoma  unknown primary  - on XRT  - Heme/ onco recs for outpt chemo      Intractable vomiting with nausea  - cont zofran, phenergan  - added dexamethasone  - improved, tolerating po      Hypokalemia  - replaced       Severe protein-calorie malnutrition  Nutrition recs        Discharged Condition: stable    Disposition: Home or Self Care    Follow Up:  Follow-up Information     Follow up with NAMITA Guerin In 1 week.    Specialty:  Family Medicine    Contact information:    [address] [place] [id number] [phone]          Schedule an appointment as soon as possible for a visit with Sylvester Yeh MD.    Specialty:  Oncology    Contact information:    [address]  [facility] [address]  [phone]          Patient Instructions:     Diet general     Activity as tolerated     Call MD for:  temperature >100.4     Call MD for:  persistent nausea and vomiting or diarrhea     Call MD for:  severe uncontrolled pain       Medications:  Reconciled Home Medications:   Current Discharge Medication List      START taking these medications    Details   dexamethasone (DECADRON) 4 MG Tab Take 2 tablets (8 mg total) by mouth every 12 (twelve) hours.  Qty: 40 tablet, Refills: 0      fentaNYL (DURAGESIC) 75 mcg/hr Place 1 patch onto the skin every 72 hours.  Qty: 5 patch, Refills: 0      ondansetron (ZOFRAN-ODT) 4 MG TbDL Take 1 tablet (4 mg total) by mouth every 8 (eight) hours as needed.  Qty: 30 tablet, Refills: 0         CONTINUE these medications which have CHANGED    Details    morphine (MS CONTIN) 30 MG 12 hr tablet Take 1 tablet (30 mg total) by mouth every 12 (twelve) hours.  Qty: 60 tablet, Refills: 0      oxycodone (ROXICODONE) 10 mg Tab immediate release tablet Take 1.5 tablets (15 mg total) by mouth every 4 (four) hours as needed for Pain.  Qty: 30 tablet, Refills: 0    Associated Diagnoses: Primary cancer of unknown site; Pain, neoplasm-related         CONTINUE these medications which have NOT CHANGED    Details   cyanocobalamin (VITAMIN B-12) 100 MCG tablet Take 100 mcg by mouth once daily.      gabapentin (NEURONTIN) 300 MG capsule Take 300 mg by mouth 3 (three) times daily.      multivitamin (ONE DAILY MULTIVITAMIN) per tablet Take 1 tablet by mouth once daily.      nicotine (NICODERM CQ) 14 mg/24 hr Place 1 patch onto the skin once daily.  Refills: 0      polyethylene glycol (GLYCOLAX) 17 gram PwPk Take 17 g by mouth once daily.  Qty: 30 each, Refills: 2      pyridoxine, vitamin B6, (VITAMIN B-6) 100 MG Tab Take 100 mg by mouth once daily.      senna-docusate 8.6-50 mg (PERICOLACE) 8.6-50 mg per tablet Take 1 tablet by mouth once daily.           Time spent on the discharge of patient: > 30 minutes    Gisel Rome PA-C  Department of Hospital Medicine  [facility]

## 2017-02-11 NOTE — PLAN OF CARE
Problem: Patient Care Overview  Goal: Plan of Care Review  Outcome: Ongoing (interventions implemented as appropriate)  Resting in a low lying bed on room air.  VSS.  Afebrile.  Ate a regular diet.  Pain controlled with prn medication.  Repositions herself  Independently.  Ambulates to RR.  Good UOP.  No falls or injuries.  Safety precautions ongoing and call light within reach.  Will continue to monitor.

## 2017-02-11 NOTE — PLAN OF CARE
Problem: Patient Care Overview  Goal: Plan of Care Review  Outcome: Ongoing (interventions implemented as appropriate)  Patient on room air; No prn treatment needed at time of visit.

## 2017-02-13 PROBLEM — M48.50XA VERTEBRAL COMPRESSION FRACTURE: Status: ACTIVE | Noted: 2017-01-01

## 2017-02-13 NOTE — ED PROVIDER NOTES
Encounter Date: 2017    SCRIBE #1 NOTE: I, Mickie Mendez, am scribing for, and in the presence of, Dr. Heart.       History     Chief Complaint   Patient presents with    Fall     Patient had a trip and fall yesterday.  Patient c/o generalized body pain.  Denies LOC.  Patient feeling nauseated. No dizziness.     Leg Swelling     Patient c/o bilateral lower leg swelling for 2 weeks.      Review of patient's allergies indicates:  No Known Allergies  HPI Comments:   Time seen by provider: 11:34 AM    The patient is a 46 y.o. female with unknown cancer etiology who presents to the ED with an acute onset of mid back pain after falling down the stairs yesterday, bilateral LE swelling, and SOB. She had radiation therapy earlier this morning, whom the doctors said she should go to the ER to get it checked out. The patient takes Morphine and Oxycodone for her chronic pain. She also experienced 2 episodes of vomiting last night. The patient denies history of LE blood clot, chest pain, or any other symptoms at this time. No pertinent SHx noted. No known drug allergies noted.    The history is provided by the patient.     Past Medical History   Diagnosis Date    Cancer     Neuropathy      No past medical history pertinent negatives.  Past Surgical History   Procedure Laterality Date    Tonsillectomy       section      Tubal ligation       History reviewed. No pertinent family history.  Social History   Substance Use Topics    Smoking status: Current Every Day Smoker     Packs/day: 0.50     Years: 15.00     Types: Cigarettes    Smokeless tobacco: None    Alcohol use No     Review of Systems   Constitutional: Negative for chills and fever.   HENT: Negative for congestion, rhinorrhea, sneezing and sore throat.    Eyes: Negative for visual disturbance.   Respiratory: Positive for shortness of breath. Negative for cough.    Cardiovascular: Positive for leg swelling. Negative for chest pain and  palpitations.   Gastrointestinal: Positive for vomiting (resolved). Negative for abdominal pain, diarrhea and nausea.   Genitourinary: Negative for dysuria and hematuria.   Musculoskeletal: Positive for back pain. Negative for neck pain.   Skin: Negative for rash.   Neurological: Negative for seizures, syncope and headaches.     Physical Exam   Initial Vitals   BP Pulse Resp Temp SpO2   02/13/17 1050 02/13/17 1050 02/13/17 1050 02/13/17 1050 02/13/17 1050   138/95 114 16 98.6 °F (37 °C) 95 %     Physical Exam    Nursing note and vitals reviewed.  Constitutional: She appears well-developed and well-nourished. She is cooperative.  Non-toxic appearance. No distress.   Patient is obviously uncomfortable and seating hunched over in the wheelchair.   HENT:   Head: Normocephalic and atraumatic.   Mouth/Throat: Mucous membranes are dry.   Oropharynx clear.   Eyes: EOM are normal. Pupils are equal, round, and reactive to light.   Conjunctivae pallor.    Neck: Normal range of motion and full passive range of motion without pain. Neck supple. No thyromegaly present. No JVD present.   Cardiovascular: Regular rhythm, normal heart sounds and normal pulses. Tachycardia present.    Pulmonary/Chest: Effort normal and breath sounds normal. Tachypnea noted. No respiratory distress.   Abdominal: Soft. Normal appearance and bowel sounds are normal. She exhibits no distension and no mass. There is no tenderness.   Musculoskeletal: She exhibits edema (bilateral 3+ nonpitting edema in LE's) and tenderness (point tenderness to mid thoracic vertebral bodies).        Thoracic back: She exhibits decreased range of motion and bony tenderness. She exhibits no deformity.        Lumbar back: She exhibits decreased range of motion and bony tenderness.   Neurological: She is alert and oriented to person, place, and time. She has normal strength. No cranial nerve deficit or sensory deficit.   Skin: Skin is warm, dry and intact. No rash noted.    Psychiatric: She has a normal mood and affect. Her speech is normal and behavior is normal. Judgment and thought content normal.       ED Course   Procedures  Labs Reviewed   CBC W/ AUTO DIFFERENTIAL - Abnormal; Notable for the following:        Result Value    RBC 3.55 (*)     Hemoglobin 9.2 (*)     Hematocrit 30.6 (*)     MCH 25.9 (*)     MCHC 30.1 (*)     RDW 16.6 (*)     Platelets 143 (*)     Lymph # 0.3 (*)     Mono # 0.2 (*)     Gran% 87.3 (*)     Lymph% 6.2 (*)     All other components within normal limits   COMPREHENSIVE METABOLIC PANEL - Abnormal; Notable for the following:     Potassium 3.4 (*)     Calcium 8.3 (*)     Albumin 2.2 (*)     Alkaline Phosphatase 175 (*)     All other components within normal limits   URINALYSIS - Abnormal; Notable for the following:     Urobilinogen, UA 4.0-6.0 (*)     All other components within normal limits     Imaging Results         CTA Chest Non-Coronary - PE Study (Final result)    Abnormal Result time:  02/13/17 14:52:39    Final result by Mahendra Delvalle MD (02/13/17 14:52:39)    Impression:        Small segmental pulmonary thromboembolism involving the anterior segmental branch of the right upper lobe.  No evidence of large central pulmonary thromboembolism.    Innumerable osseous metastatic lesions involving the spine and ribs with mild compression fractures as detailed on CT thoracic spine.  There are also multiple remote and healing rib fractures.    Multiple soft tissue nodules within the subcutaneous tissues concern for metastatic disease.    Bilateral adrenal masses and left hepatic lobe lesion also concerning for metastatic disease.    Bibasilar atelectasis with left lower lobe atelectasis and consolidation and trace left pleural fluid.    Few scattered pulmonary micronodules.  Followup is recommended.    This report was Flagged in the [facility] Medical record.       Electronically signed by: MAHENDRA DELVALLE MD  Date:     02/13/17  Time:    14:52     Narrative:     Comparison: Chest radiograph of the same day    Technique: Axial images of the chest following administration of 75 cc of omni-350 IV contrast according to the PE protocol.    Findings: The structures at the base of the neck demonstrate no significant abnormality.  The heart is mildly enlarged.  There is no significant pericardial fluid.  There is a mildly enlarged prevascular lymph node measuring 1.5 cm.  There is no abnormal axillary lymph node enlargement.  There is mild prominence of the left hilar lymph nodes.    The trachea and central airways are patent.  There is right middle lobe atelectasis and left lower lobe and lingular atelectasis and consolidation with trace left pleural fluid.  There are a few scattered 4 mm nodules within the right lung.  There is no evidence of pneumothorax.    The aorta is normal in caliber.  There is no evidence of central pulmonary thromboembolism the basilar segmental branches are difficult to fully evaluate.  There is a small filling defect within the anterior segmental branch of the right upper lobe concerning for small pulmonary thromboembolism.    There is a peripheral hypodensity at the junctions of segment 3 and 4 of the left hepatic lobe.  The liver is only partially included on the exam.  There is pneumobilia.  There are bilateral adrenal masses concerning for metastatic disease.  There are numerous soft tissue nodules within the subcutaneous soft tissues.    There are innumerable lytic lesions throughout the thoracic spine and ribs.  There are multiple mild compression fractures which were documented on CT of the thoracic spine on the same day.  There is no definite encroachment on the central spinal canal.  There are multiple healing and remote rib fractures.            CT Thoracic Spine Without Contrast (Final result) Result time:  02/13/17 13:13:19    Final result by Jania Reynolds MD (02/13/17 13:13:19)    Impression:     Interval development of pathologic  fractures of the T6 and T10 vertebral bodies which occupy approximately 50% of each vertebral body.  This is identified in a patient with diffuse lytic osseous metastases.  No central canal or neuroforaminal compromise identified.      Electronically signed by: MATIAS REYNOLDS MD  Date:     02/13/17  Time:    13:13     Narrative:    Comparison: MRI thoracic spine dated 1/13/17.    Technique: 2.5 mm axial images were obtained through the thoracic spine without the administration of contrast.  Sagittal coronal reformats were also submitted.    Findings: Diffuse, lytic osseous metastases are identified.  Since the recent exam, there has been interval development of pathologic fractures at T6 and T10 which occupy approximately 50% of the vertebral body.  Incidental note is made of a healing pathologic rib fracture posteriorly on the right at T12.  There is a central canal or neuroforaminal stenosis.  Visualized portions of the lungs and visualized hollow abdominal organs show no abnormalities.            CT Lumbar Spine Without Contrast (Final result) Result time:  02/13/17 13:07:21    Final result by Matias Reynolds MD (02/13/17 13:07:21)    Impression:      1.  In this patient with diffuse osseous metastatic disease, there are findings compatible with an acute fracture of the L2 vertebral body and possible subacute to remote fracture of the right inferior endplate at L3.    2.  Moderate central canal stenosis identified at L2-L3.  No severe central canal or neuroforaminal stenosis identified on this exam.        Electronically signed by: MATIAS REYNOLDS MD  Date:     02/13/17  Time:    13:07     Narrative:    Comparison: 1/13/17.    Technique: Contiguous 2.5 mm axial images were obtained through the lumbar spine without the administration of contrast.  Sagittal and coronal reformats were also submitted.    Findings: There is redemonstration of diffuse, lytic osseous metastasis.  There is an acute fracture of the  left aspect of the L2 vertebral body.  There is persistent deformity of the right inferior endplate at L3.  A lytic metastasis is identified at this level.  Incidental note is made of a healing pathologic rib fracture at T12 posteriorly on the right incidental note is also made of calcified atherosclerosis of the aorta and its visualized branches.    L1-L2: No significant disc or joint pathology.    L2-L3: The pathological fracture through the L2 vertebral body results in moderate left neural foraminal stenosis as well as moderate central canal stenosis.  The right neural foramen is patent.  There is no significant disc or joint pathology at this level.    L3-L4: There is a diffuse disc bulge at this level and mild right neural foraminal stenosis is present.  The central canal and left neuroforamen are patent.    L4-L5: There is a diffuse disc bulge present with mild bilateral facet arthropathy.  This results in mild central canal and right neural foraminal stenosis.  Mild left neuroforaminal stenosis is present.    L5-S1: No significant disc or joint pathology at this level.            X-Ray Chest PA And Lateral (Final result) Result time:  02/13/17 12:55:37    Final result by Mahendra Delvalle MD (02/13/17 12:55:37)    Impression:        Bibasilar atelectasis with left lower lobe consolidation and small left pleural effusion.    Multiple vertebral body compression fractures many of which appear new.  Prior MRIs demonstrated widespread osseous metastatic disease.              Electronically signed by: MAHENDRA DELVALLE MD  Date:     02/13/17  Time:    12:55     Narrative:    Comparison 2/2/17    Technique: Chest PA and lateral.    Findings: The cardiac silhouette is within normal range.  There is no evidence of pneumothorax.  There is bibasilar atelectasis/consolidation with small left pleural effusion.  The upper lung zones appear relatively clear.  The widespread osseous metastatic disease noted on prior MRI is  difficult to visualize on radiographs.  There are multiple compression fractures involving the T5, T9 and L2 vertebral bodies.  There is a fracture of the right eighth rib which is likely chronic.  There is a partially visualized biliary stent.            EKG Readings: (Independently Interpreted)   Initial Reading: No STEMI.   Sinus tachycardia at a rate of 114 bpm with normal axis and normal intervals.      X-Rays:   Independently Interpreted Readings:   Chest X-Ray: Unable to visualized left costophrenic angle. Possible left lower lobe infiltrate. Possible compression fracture.      Medical Decision Making:   History:   Old Medical Records: I decided to obtain old medical records.  Initial Assessment:   Urgent evaluation of 46-year-old female with metastatic adenocarcinoma of unknown primary etiology, currently receiving radiation therapy- last session today, and was sent to the ED for evaluation given significant pain.  Prior imaging revealed innumerable metastatic lesions throughout the thoracic and lumbar spine. Pt endorses worsening back pain since falling while descending stairs yesterday. Pt denies paresthesias, but does have point tenderness to midthoracic midline concerning for compression fx. Pt is tachycardic, dehydrated appearing, in significant pain.  We'll administer analgesia, IV fluids, obtain labs, imaging of the chest, rule out PE, as well as vertebral imaging to rule out acute fracture.  Differential Diagnosis:   Imaging notable for left lower lobe consolidation, left pleural effusion.  Multiple new compression fractures T5, T6, T9, T10, L2.  Clinical Tests:   Lab Tests: Reviewed and Ordered  Radiological Study: Ordered and Reviewed  Medical Tests: Reviewed and Ordered            Scribe Attestation:   Scribe #1: I performed the above scribed service and the documentation accurately describes the services I performed. I attest to the accuracy of the note.    Attending Attestation:            Physician Attestation for Scribe:  Physician Attestation Statement for Scribe #1: I, Dr. Heart, reviewed documentation, as scribed by Mickie Mendez in my presence, and it is both accurate and complete.         Attending ED Notes:   1:56 PM  On re-evaluation, the patient is just now receiving analgesia. She was made aware of new diagnosis of vertebral fractures. Still awaiting CTA to diagnosis PE.     2:01 PM  [facility]ed the [facility] to discuss the case with Orthopedic Spine.    Pt admitted to medicine for intractable pain, tachycardia and new vertebral compression fractures.           ED Course     Clinical Impression:     1. Vertebral compression fracture, initial encounter    2. Fall    3. Tachycardia    4. Cancer    5. Chronic pain due to neoplasm          Disposition:   Disposition: Admitted  Condition: Loni Heart MD  02/13/17 [id number]

## 2017-02-13 NOTE — ED TRIAGE NOTES
Pt states fell yesterday and is having back pain in upper area - states hx of metastatic cancer to spine and had radiation treatment today - states that was the last treatment - sent here from radiology

## 2017-02-13 NOTE — IP AVS SNAPSHOT
[facility] ([facility])  [address] [place] [id number]  Phone: [phone]           Patient Discharge Instructions     Our goal is to set you up for success. This packet includes information on your condition, medications, and your home care. It will help you to care for yourself so you don't get sicker and need to go back to the hospital.     Please ask your nurse if you have any questions.        There are many details to remember when preparing to leave the hospital. Here is what you will need to do:    1. Take your medicine. If you are prescribed medications, review your Medication List in the following pages. You may have new medications to  at the pharmacy and others that you'll need to stop taking. Review the instructions for how and when to take your medications. Talk with your doctor or nurses if you are unsure of what to do.     2. Go to your follow-up appointments. Specific follow-up information is listed in the following pages. Your may be contacted by a transition nurse or clinical provider about future appointments. Be sure we have all of the phone numbers to reach you, if needed. Please contact your provider's office if you are unable to make an appointment.     3. Watch for warning signs. Your doctor or nurse will give you detailed warning signs to watch for and when to call for assistance. These instructions may also include educational information about your condition. If you experience any of warning signs to your health, call your doctor.               ** Verify the list of medication(s) below is accurate and up to date. Carry this with you in case of emergency. If your medications have changed, please notify your healthcare provider.             Medication List      START taking these medications        Additional Info                      apixaban 5 mg Tab   Quantity:  60 tablet   Refills:  0   Dose:  5 mg    Last time this was given:  5 mg on 2/19/2017  8:58  AM   Instructions:  Take 1 tablet (5 mg total) by mouth 2 (two) times daily.     Begin Date    AM    Noon    PM    Bedtime       clotrimazole 10 mg tanja   Commonly known as:  MYCELEX   Quantity:  35 tablet   Refills:  0   Dose:  10 mg    Last time this was given:  10 mg on 2/19/2017  6:00 AM   Instructions:  Take 1 tablet (10 mg total) by mouth 5 (five) times daily.     Begin Date    AM    Noon    PM    Bedtime         CHANGE how you take these medications        Additional Info                      morphine 30 MG 12 hr tablet   Commonly known as:  MS CONTIN   Quantity:  60 tablet   Refills:  0   Dose:  60 mg   What changed:  how much to take    Last time this was given:  30 mg on 2/19/2017  8:56 AM   Instructions:  Take 2 tablets (60 mg total) by mouth every 12 (twelve) hours.     Begin Date    AM    Noon    PM    Bedtime       oxycodone 10 mg Tab immediate release tablet   Commonly known as:  ROXICODONE   Quantity:  60 tablet   Refills:  0   Dose:  10 mg   What changed:  how much to take    Last time this was given:  15 mg on 2/19/2017  6:13 AM   Instructions:  Take 1 tablet (10 mg total) by mouth every 4 (four) hours as needed for Pain.     Begin Date    AM    Noon    PM    Bedtime         CONTINUE taking these medications        Additional Info                      dexamethasone 4 MG Tab   Commonly known as:  DECADRON   Quantity:  40 tablet   Refills:  0   Dose:  8 mg    Last time this was given:  8 mg on 2/19/2017  8:57 AM   Instructions:  Take 2 tablets (8 mg total) by mouth every 12 (twelve) hours.     Begin Date    AM    Noon    PM    Bedtime       fentaNYL 75 mcg/hr   Commonly known as:  DURAGESIC   Quantity:  5 patch   Refills:  0   Dose:  1 patch    Last time this was given:  1 patch on 2/16/2017  9:53 AM   Instructions:  Place 1 patch onto the skin every 72 hours.     Begin Date    AM    Noon    PM    Bedtime       gabapentin 300 MG capsule   Commonly known as:  NEURONTIN   Refills:  0   Dose:  300 mg     Last time this was given:  300 mg on 2/19/2017  6:13 AM   Instructions:  Take 300 mg by mouth 3 (three) times daily.     Begin Date    AM    Noon    PM    Bedtime       nicotine 14 mg/24 hr   Commonly known as:  NICODERM CQ   Refills:  0   Dose:  1 patch    Last time this was given:  1 patch on 2/19/2017  8:59 AM   Instructions:  Place 1 patch onto the skin once daily.     Begin Date    AM    Noon    PM    Bedtime       ondansetron 4 MG Tbdl   Commonly known as:  ZOFRAN-ODT   Quantity:  30 tablet   Refills:  0   Dose:  4 mg    Instructions:  Take 1 tablet (4 mg total) by mouth every 8 (eight) hours as needed.     Begin Date    AM    Noon    PM    Bedtime       ONE DAILY MULTIVITAMIN per tablet   Refills:  0   Dose:  1 tablet   Generic drug:  multivitamin    Instructions:  Take 1 tablet by mouth once daily.     Begin Date    AM    Noon    PM    Bedtime       polyethylene glycol 17 gram Pwpk   Commonly known as:  GLYCOLAX   Quantity:  30 each   Refills:  2   Dose:  17 g    Last time this was given:  17 g on 2/16/2017  9:38 AM   Instructions:  Take 17 g by mouth once daily.     Begin Date    AM    Noon    PM    Bedtime       senna-docusate 8.6-50 mg 8.6-50 mg per tablet   Commonly known as:  PERICOLACE   Refills:  0   Dose:  1 tablet    Last time this was given:  1 tablet on 2/19/2017  8:56 AM   Instructions:  Take 1 tablet by mouth once daily.     Begin Date    AM    Noon    PM    Bedtime       VITAMIN B-12 100 MCG tablet   Refills:  0   Dose:  100 mcg   Generic drug:  cyanocobalamin    Last time this was given:  250 mcg on 2/19/2017  8:58 AM   Instructions:  Take 100 mcg by mouth once daily.     Begin Date    AM    Noon    PM    Bedtime       VITAMIN B-6 100 MG Tab   Refills:  0   Dose:  100 mg   Generic drug:  pyridoxine (vitamin B6)    Last time this was given:  100 mg on 2/18/2017 10:44 AM   Instructions:  Take 100 mg by mouth once daily.     Begin Date    AM    Noon    PM    Bedtime            Where to Get  Your Medications      These [facility] to [id number] [place] - [address]  [address] [facility] [address], [place] [address]     Phone:  [phone]     apixaban 5 mg Tab    clotrimazole 10 mg tanja         You can get these medications from any pharmacy     Bring a paper prescription for each of these medications     morphine 30 MG 12 hr tablet    oxycodone 10 mg Tab immediate release tablet                  Please bring to all follow up appointments:    1. A copy of your discharge instructions.  2. All medicines you are currently taking in their original bottles.  3. Identification and insurance card.    Please arrive 15 minutes ahead of scheduled appointment time.    Please call 24 hours in advance if you must reschedule your appointment and/or time.        Your Scheduled Appointments     Feb 20, 2017  8:10 AM CST   Non-Fasting Lab with LAB, HEMONC CANCER BLDG   [facility]    [address]  [place] [address]   [phone]            Feb 20, 2017  9:30 AM CST   Established Patient Visit with Hung Rasmussen DO, FACP   [facility] ([facility])    [facility]  [place] [address]   [phone]            Mar 10, 2017  8:30 AM CST   Infusion 300 Min with [facility], CHEMO   [facility] ([facility])    [address] [facility] [address]   [phone]              Follow-up Information     Follow up with Sylvester Yeh MD In 1 day.    Specialty:  Oncology    Contact information:    [facility] [facility] [address]  [phone]            Primary Diagnosis     Your primary diagnosis was:  Compression Fracture Of Vertebral Column      Admission Information     Date & Time Provider Department CSN    2/13/2017 11:24 AM Jere Gracia MD [facility] [id number]      Care Providers     Provider Role Specialty Primary office phone    Jere HOLCOMB  "MD Basil Attending Provider Hospitalist [phone]    Kieran Allan MD Consulting Physician  Orthopedic Surgery [phone]    Barry Stratton MD Consulting Physician  Hematology and Oncology [phone]    Ronaldo Brown MD Surgeon  Radiology [phone]      Your Vitals Were     BP Pulse Temp Resp Height Weight    133/79 (BP Location: Left arm, Patient Position: Lying, BP Method: Automatic) 63 98.1 °F (36.7 °C) (Oral) 18 5' 2" (1.575 m) 81.6 kg (180 lb)    Last Period SpO2 BMI          01/02/2017 95% 32.92 kg/m2        Recent Lab Values        1/9/2017                           5:45 AM           A1C 6.4 (H)           Comment for A1C at  5:45 AM on 1/9/2017:  According to ADA guidelines, hemoglobin A1C <7.0% represents  optimal control in non-pregnant diabetic patients.  Different  metrics may apply to specific populations.   Standards of Medical Care in Diabetes - 2016.  For the purpose of screening for the presence of diabetes:  <5.7%     Consistent with the absence of diabetes  5.7-6.4%  Consistent with increasing risk for diabetes   (prediabetes)  >or=6.5%  Consistent with diabetes  Currently no consensus exists for use of hemoglobin A1C  for diagnosis of diabetes for children.        Allergies as of 2/19/2017     No Known Allergies      [facility]     [facility] Nurse Care Line - 24/7 Assistance  Unless otherwise directed by your provider, please contact [facility], our nurse care line that is available for 24/7 assistance.     Registered nurses in the [facility] provide clinical advisement, health education, appointment booking, and other advisory services.  Call for this free service at [phone].        Advance Directives     An advance directive is a document which, in the event you are no longer able to make decisions for yourself, tells your healthcare team what kind of treatment you do or do not want to receive, or who you would like to make those " decisions for you.  If you do not currently have an advance directive, [facility] encourages you to create one.  For more information call:  [phone]), [phone] ([phone]),  or log on to [email or web address].        Smoking Cessation     If you would like to quit smoking:   You may be eligible for free services if you are a [place] resident and started smoking cigarettes before September 1, 1988.  Call the Smoking Cessation Trust (SCT) toll free at (659) 345-2845 or (575) 025-2402.   Call 4-916-QUIT-NOW if you do not meet the above criteria.            Language Assistance Services     ATTENTION: Language assistance services are available, free of charge. Please call [phone].      ATENCIÓN: Si habla rhea, tiene a bryant disposición servicios gratuitos de asistencia lingüística. Llame al [phone].     CHÚ Ý: N?u b?n nói Ti?ng Vi?t, có các d?ch v? h? tr? ngôn ng? mi?n phí dành cho b?n. G?i s? [phone].        Aleah Martinez           [facility] Sign-Up     Activating your [facility] account is as easy as 1-2-3!     1) Visit [email or web address], select Sign Up Now, enter this activation code and your date of birth, then select Next.  [email or web address]  Expires: 2/27/2017  1:08 PM      2) Create a username and password to use when you visit [facility] in the future and select a security question in case you lose your password and select Next.    3) Enter your e-mail address and click Sign Up!    Additional Information  If you have questions, please e-mail [email or web address] or call [phone] to talk to our [facility] staff. Remember, [facility] is NOT to be used for urgent needs. For medical emergencies, dial 911.          [facility] complies with applicable Federal civil rights laws and does not discriminate on the basis of race, color, national origin, age, disability, or sex.

## 2017-02-13 NOTE — ED NOTES
Pt AAOx4, oxygen therapy applied at 3L. Pt heart rate elevated at this time. Cardiac monitor applied.  notified, new orders will be entered. Pt denies any needs at this time. Will continue to monitor for any changes.

## 2017-02-14 NOTE — NURSING
VSS; pt free of falls, injury and skin breakdown; pt tolerated po fluids; remains with decreased appetite; pt reports having poor appetite at home; boost ordered. IV analgesics given for pain management. Needs assessed hourly; pt independent with adls. Pt ambulatory without assist. Safety interventions in place. Call bell in reach.

## 2017-02-14 NOTE — PLAN OF CARE
ATTN: TEAM   DC PLANNING      NO NEEDS @ THIS TIME - INDEPENDENT LIVES WITH ADULT CHILDREN     READMIT  WITH IN 30 DAYS    DME OWNS :  HOME O2    [facility]AC[facility]      RECOMMENDATION PT /[place] (IN HOUSE )     Matilde Choi RN  Case management 2/14/20171:11 PM  # [phone] (FAX) [phone]     02/14/17 1312   Discharge Assessment   Assessment Type Discharge Planning Assessment   Confirmed/corrected address and phone number on facesheet? Yes   Assessment information obtained from? Patient   Communicated expected length of stay with patient/caregiver yes   Prior to hospitilization cognitive status: Alert/Oriented   Prior to hospitalization functional status: Independent   Current cognitive status: Alert/Oriented   Current Functional Status: Independent   Arrived From admitted as an inpatient   Lives With child(konrad), adult   Able to Return to Prior Arrangements yes   Is patient able to care for self after discharge? Yes   Patient's perception of discharge disposition admitted as an inpatient   Patient currently being followed by outpatient case management? No   Patient currently receives home health services? No   Does the patient currently use HME? No   Patient currently receives private duty nursing? N/A   Patient currently receives any other outside agency services? No   Equipment Currently Used at Home oxygen   Do you have any problems affording any of your prescribed medications? TBD   Is the patient taking medications as prescribed? yes   Do you have any financial concerns preventing you from receiving the healthcare you need? No   Does the patient have transportation to healthcare appointments? No   On Dialysis? No   Are there any open cases? No   Discharge Plan A Home with family   Discharge Plan B Home with family   Patient/Family In Agreement With Plan yes

## 2017-02-14 NOTE — NURSING
Remains free from fall, injury, and skin breakdown. Voiding with assistance. VSS on RA / PRN O2 throughout the night. Pain moderately controlled with PO/IV meds. TEDs/SCDs in place. Plan of care reviewed with patient and all questions answered. Bed low, locked w/ bed alarm on. Call light within reach. Purposeful rounding performed. Resting comfortably in bed, no other complaints at this time.

## 2017-02-14 NOTE — PLAN OF CARE
Pt admitted for severe pain after a fall down stairs. Pt appears to be in significant pain, and was not interested in answering questions.  CM to follow and assist with plans.     02/14/17 0833   Discharge Assessment   Assessment Type Discharge Planning Assessment   Confirmed/corrected address and phone number on facesheet? Yes   Assessment information obtained from? Patient;Medical Record   Expected Length of Stay (days) 3   Communicated expected length of stay with patient/caregiver yes   Type of Healthcare Directive Received (pt states she has none at this time)   Prior to hospitilization cognitive status: Alert/Oriented   Prior to hospitalization functional status: Needs Assistance;Assistive Equipment   Current cognitive status: Alert/Oriented   Current Functional Status: Assistive Equipment;Needs Assistance   Arrived From home or self-care   Able to Return to Prior Arrangements yes   Is patient able to care for self after discharge? Yes  (with family assist)   How many people do you have in your home that can help with your care after discharge? 1   Who are your caregiver(s) and their phone number(s)? KARRI Su   Patient's perception of discharge disposition home or selfcare   Readmission Within The Last 30 Days other (see comments)  (yes, pt re-admitted for pain)   Patient currently being followed by outpatient case management? No   Patient currently receives home health services? No   Patient previously received home health services and would like to resume services if necessary? No   Does the patient currently use HME? Yes   Do you have any problems affording any of your prescribed medications? No   Is the patient taking medications as prescribed? yes   Do you have any financial concerns preventing you from receiving the healthcare you need? No   Does the patient have transportation to healthcare appointments? Yes   Transportation Available family or friend will provide   On Dialysis? No   Are there  any open cases? No   Discharge Plan A Home with family   Patient/Family In Agreement With Plan yes

## 2017-02-14 NOTE — NURSING
Pt reporting decrease appetite; pt requesting boost orange flavor; increase fluid intake encouraged. JEFRY HENAO paged.

## 2017-02-14 NOTE — CONSULTS
[facility]  Hematology/Oncology  Consult Note    Patient Name: Kat Corley  MRN: [id number]  Admission Date: 2/13/2017  Hospital Length of Stay: 1 days  Code Status: Full Code   Attending Provider: Jere Gracia MD  Consulting Provider: Yolanda Zamarripa MD  Primary Care Physician: NAMITA Guerin  Principal Problem:Vertebral compression fracture    Inpatient consult to Hematology  Consult performed by: YOLANDA ZAMARRIPA  Consult ordered by: DANELLE NIELSEN        Subjective:     HPI:     Admitted after developing worsening back pain after a fall at home. She had also noted increasing shortness of breath and leg swelling.  This prompted below imaging:  CTA 2/13/17:  Small segmental pulmonary thromboembolism involving the anterior segmental branch of the right upper lobe.  No evidence of large central pulmonary thromboembolism.  Innumerable osseous metastatic lesions involving the spine and ribs with mild compression fractures as detailed on CT thoracic spine.  There are also multiple remote and healing rib fractures.  Multiple soft tissue nodules within the subcutaneous tissues concern for metastatic disease.  Bilateral adrenal masses and left hepatic lobe lesion also concerning for metastatic disease.  Bibasilar atelectasis with left lower lobe atelectasis and consolidation and trace left pleural fluid.  Few scattered pulmonary micronodules.  Followup is recommended.    CT T/L spine 2/13/17:  Findings: Diffuse, lytic osseous metastases are identified.  Since the recent exam, there has been interval development of pathologic fractures at T6 and T10 which occupy approximately 50% of the vertebral body.  Incidental note is made of a healing pathologic rib fracture posteriorly on the right at T12.  There is a central canal or neuroforaminal stenosis.  Visualized portions of the lungs and visualized hollow abdominal organs show no abnormalities.    Oncology History to date:  This is a 46 y.o. female  presents with widely metastatic adenocarcinoma who was diagnosed in 1/2017 after presenting with pancreatitis at [facility]..  She was found to have elevated lipase, bilirubin and transaminases and CT scan revealed pancreatitis, dilated CBD and extensive osteolytic bone lesions in lumbar and thoracic spine concerning for metastatic disease. In addition, found to have adrenal masses bilaterally and mass between stomach and spleen. CT chest and CT head did not reveal any primary source of malignancy.   A needle biopsy of an abdominal wall nodule performed on January 10 showed metastatic adenocarcinoma which was GC DFP positive, ER and GA were negative and HER-2 was negative by FISH. Cytokeratin 7 was positive. Cytokeratin 20 was negative. CDX2 and synaptophysin were negative. TTE F-1 was equivocal. Ki-67 was greater than 50%.  She was transferred to [facility] [place] [facility] and underwent an EUS, ERCP on 1/12/17 and found to have a severe bilary stricture, a metal stent was placed in the CBD. Her bilirubin normalized post.    Imaging to date has revealed multiple osteolytic bone metastasis in the sacrum lumbar and thoracic spine, multiple ribs, bilateral adrenals.  Mammograms and ultrasound of the breast performed on January 27 showed multiple bilateral nodules in the breast most consistent with metastatic disease. There was no dominant lesion noted.  All of her tumor markers have been elevated  at 7700, CEA at 5400 and CA-19-9 at 6000.     She was treated with Zometa x 1 for hypercalcemia.    She has completed palliative XRT to T and L spine and was to initiate chemotherapy this week.     Last mammogram and pap smear were 4 years ago which was normal. No hx of abnormal pap smears. No prior colonoscopy.     SH: She works as . She lives in [place] with boyfriend and daughter.  FH: significant for father with brain tumor at 44, mother lung cancer both have passed away within the past year and  paternal grandmother had breast cancer.    Oncology Treatment Plan:   OP BREAST PACLITAXEL CARBOPLATIN Q3W    Medications:  Continuous Infusions:   sodium chloride 0.9% 125 mL/hr at 17 2030     Scheduled Meds:   cyanocobalamin  250 mcg Oral Daily    dexamethasone  8 mg Oral Q12H    enoxaparin  1 mg/kg Subcutaneous Q12H    fentaNYL  1 patch Transdermal Q72H    gabapentin  300 mg Oral TID    morphine  30 mg Oral Q12H    multivitamin  1 tablet Oral Daily    nicotine  1 patch Transdermal Daily    polyethylene glycol  17 g Oral Daily    pyridoxine (vitamin B6)  100 mg Oral Daily    senna-docusate 8.6-50 mg  1 tablet Oral Daily     PRN Meds:acetaminophen, HYDROmorphone, HYDROmorphone, metoclopramide HCl, ondansetron     Review of patient's allergies indicates:  No Known Allergies     Past Medical History   Diagnosis Date    Cancer     Neuropathy      Past Surgical History   Procedure Laterality Date    Tonsillectomy       section      Tubal ligation       Family History     None        Social History Main Topics    Smoking status: Current Every Day Smoker     Packs/day: 0.50     Years: 15.00     Types: Cigarettes    Smokeless tobacco: Not on file    Alcohol use No    Drug use: Not on file    Sexual activity: Yes       Review of Systems   Constitutional: Positive for activity change, appetite change and unexpected weight change. Negative for chills, fatigue and fever.   HENT: Negative for congestion, ear pain, hearing loss, mouth sores, nosebleeds, postnasal drip, rhinorrhea, sinus pressure, sneezing, trouble swallowing and voice change.    Eyes: Negative for redness and visual disturbance.   Respiratory: Positive for shortness of breath. Negative for cough, chest tightness and wheezing.    Cardiovascular: Positive for chest pain and leg swelling. Negative for palpitations.   Gastrointestinal: Negative for abdominal distention, abdominal pain, blood in stool, constipation, diarrhea,  nausea and vomiting.   Genitourinary: Negative for decreased urine volume, difficulty urinating, dysuria, frequency, hematuria and urgency.   Musculoskeletal: Positive for arthralgias and back pain. Negative for joint swelling, myalgias, neck pain and neck stiffness.   Skin: Negative for color change, pallor, rash and wound.   Neurological: Negative for dizziness, weakness, numbness and headaches.   Hematological: Negative for adenopathy. Does not bruise/bleed easily.   Psychiatric/Behavioral: Positive for dysphoric mood and sleep disturbance. The patient is nervous/anxious.      Objective:     Vital Signs (Most Recent):  Temp: 98.2 °F (36.8 °C) (02/14/17 1110)  Pulse: 96 (02/14/17 1110)  Resp: 18 (02/14/17 1110)  BP: 119/72 (02/14/17 1110)  SpO2: (!) 93 % (02/14/17 1110) Vital Signs (24h Range):  Temp:  [97.8 °F (36.6 °C)-98.6 °F (37 °C)] 98.2 °F (36.8 °C)  Pulse:  [] 96  Resp:  [16-18] 18  SpO2:  [90 %-97 %] 93 %  BP: (105-177)/(57-94) 119/72     Weight: 81.6 kg (180 lb)  Body mass index is 32.92 kg/(m^2).  Body surface area is 1.89 meters squared.      Intake/Output Summary (Last 24 hours) at 02/14/17 1309  Last data filed at 02/13/17 2200   Gross per 24 hour   Intake              240 ml   Output                0 ml   Net              240 ml       Physical Exam   Constitutional: She is oriented to person, place, and time. She appears well-developed and well-nourished. No distress.   Currently alone in hospital room   HENT:   Head: Normocephalic and atraumatic.   Right Ear: External ear normal.   Left Ear: External ear normal.   Nose: Nose normal.   Mouth/Throat: Oropharynx is clear and moist.   Eyes: Conjunctivae and EOM are normal. Pupils are equal, round, and reactive to light. Right eye exhibits no discharge. Left eye exhibits no discharge. No scleral icterus.   Neck: Normal range of motion. Neck supple. No tracheal deviation present. No thyromegaly present.   Cardiovascular: Normal rate, regular rhythm,  normal heart sounds and intact distal pulses.  Exam reveals no gallop and no friction rub.    No murmur heard.  Pulmonary/Chest: Effort normal and breath sounds normal. No stridor. No respiratory distress. She has no wheezes. She has no rales. She exhibits no tenderness.   Abdominal: Soft. Bowel sounds are normal. She exhibits no distension and no mass. There is no tenderness. There is no rebound and no guarding.   No organomegaly   Musculoskeletal: Normal range of motion. She exhibits no edema, tenderness or deformity.   Lymphadenopathy:     She has no cervical adenopathy.   Neurological: She is alert and oriented to person, place, and time. No cranial nerve deficit. She exhibits normal muscle tone. Coordination normal.   Skin: Skin is warm and dry. No rash noted. She is not diaphoretic. No erythema. No pallor.   Psychiatric: Her behavior is normal. Judgment and thought content normal.   tearful   Nursing note and vitals reviewed.      Significant Labs:   CBC:   Recent Labs  Lab 02/13/17  1301 02/14/17  0500   WBC 4.34 2.94*   HGB 9.2* 7.8*   HCT 30.6* 24.6*   * 111*   , CMP:   Recent Labs  Lab 02/13/17  1301 02/14/17  0500    140   K 3.4* 3.4*    103   CO2 28 26   GLU 98 144*   BUN 6 6   CREATININE 0.6 0.5   CALCIUM 8.3* 7.3*   PROT 6.5  --    ALBUMIN 2.2*  --    BILITOT 0.8  --    ALKPHOS 175*  --    AST 21  --    ALT 17  --    ANIONGAP 12 11   EGFRNONAA >60 >60    and Coagulation: No results for input(s): INR, APTT in the last 48 hours.    Invalid input(s): PT    Diagnostic Results:  I have reviewed all pertinent imaging results/findings within the past 24 hours.    Assessment/Plan:     Active Diagnoses:    Diagnosis Date Noted POA    PRINCIPAL PROBLEM:  Vertebral compression fracture [M48.50XA] 02/13/2017 Yes      Problems Resolved During this Admission:    Diagnosis Date Noted Date Resolved POA     Plan-  1. Worsening back pain post fall to area treated recently with XRT  New pathologic  fractures read on CT  Would consult spine service as has completed local XRT to this area  2. Metastatic adenocarcinoma of unknown primary- will initiate therapy with chemotherapy in near future  3. New small PE on scans- likely not causing any symptoms  Currently benefit outweighs risk for treatment but unclear will need long term  With worsening anemia- would check for occult blood however in stool and monitor closely  Maintain lovenox for now and can potentially switch to oral DTI outpatient      Thank you for your consult. I will sign off. Please contact us if you have any additional questions.    Yolanda Madsen MD  Hematology/Oncology  [facility]

## 2017-02-15 PROBLEM — I26.99 ACUTE PULMONARY EMBOLISM: Status: ACTIVE | Noted: 2017-01-01

## 2017-02-15 PROBLEM — D64.9 ANEMIA: Status: ACTIVE | Noted: 2017-01-01

## 2017-02-15 NOTE — ASSESSMENT & PLAN NOTE
Widespread malignancy of unknown primary.  Grave prognosis.  Does not want hospice.  Will get port in to start chemo asap, but she is so ill she may not tolerate it

## 2017-02-15 NOTE — PLAN OF CARE
Power Port Implant device placed in Rt upper arm. Port accessed per Dr. Brown. Placement verified. Pt without complaint post procedure. AAO x 4. Report phoned to floor. Pt awaiting transport back to room.

## 2017-02-15 NOTE — PROGRESS NOTES
"8:32 AM Cassidy @ [facility] notified of order for TLSO brace, states, "we will send a rep out". Primary RN, Cammie, updated.   "

## 2017-02-15 NOTE — SUBJECTIVE & OBJECTIVE
Past Medical History   Diagnosis Date    Cancer     Neuropathy        Past Surgical History   Procedure Laterality Date    Tonsillectomy       section      Tubal ligation         Review of patient's allergies indicates:  No Known Allergies    No current facility-administered medications on file prior to encounter.      Current Outpatient Prescriptions on File Prior to Encounter   Medication Sig    cyanocobalamin (VITAMIN B-12) 100 MCG tablet Take 100 mcg by mouth once daily.    dexamethasone (DECADRON) 4 MG Tab Take 2 tablets (8 mg total) by mouth every 12 (twelve) hours.    fentaNYL (DURAGESIC) 75 mcg/hr Place 1 patch onto the skin every 72 hours.    gabapentin (NEURONTIN) 300 MG capsule Take 300 mg by mouth 3 (three) times daily.    morphine (MS CONTIN) 30 MG 12 hr tablet Take 1 tablet (30 mg total) by mouth every 12 (twelve) hours.    multivitamin (ONE DAILY MULTIVITAMIN) per tablet Take 1 tablet by mouth once daily.    nicotine (NICODERM CQ) 14 mg/24 hr Place 1 patch onto the skin once daily.    ondansetron (ZOFRAN-ODT) 4 MG TbDL Take 1 tablet (4 mg total) by mouth every 8 (eight) hours as needed.    oxycodone (ROXICODONE) 10 mg Tab immediate release tablet Take 1.5 tablets (15 mg total) by mouth every 4 (four) hours as needed for Pain.    polyethylene glycol (GLYCOLAX) 17 gram PwPk Take 17 g by mouth once daily.    pyridoxine, vitamin B6, (VITAMIN B-6) 100 MG Tab Take 100 mg by mouth once daily.    senna-docusate 8.6-50 mg (PERICOLACE) 8.6-50 mg per tablet Take 1 tablet by mouth once daily.     Family History     None        Social History Main Topics    Smoking status: Current Every Day Smoker     Packs/day: 0.50     Years: 15.00     Types: Cigarettes    Smokeless tobacco: Not on file    Alcohol use No    Drug use: Not on file    Sexual activity: Yes     Review of Systems   Constitutional: Positive for activity change, appetite change and fatigue. Negative for chills, fever and  unexpected weight change.   HENT: Negative for dental problem, ear pain and trouble swallowing.    Eyes: Negative.    Respiratory: Negative for cough, shortness of breath and wheezing.    Cardiovascular: Negative for chest pain and leg swelling.   Gastrointestinal: Positive for diarrhea. Negative for abdominal distention, constipation and vomiting.   Endocrine: Negative for polydipsia and polyphagia.   Genitourinary: Negative for dysuria and flank pain.   Musculoskeletal: Positive for back pain. Negative for arthralgias, myalgias, neck pain and neck stiffness.   Skin: Negative for rash.   Allergic/Immunologic: Negative.    Neurological: Negative for syncope, weakness and headaches.   Psychiatric/Behavioral: Negative for agitation and behavioral problems.   All other systems reviewed and are negative.    Objective:     Vital Signs (Most Recent):  Temp: 97.9 °F (36.6 °C) (02/15/17 0700)  Pulse: 88 (02/15/17 0700)  Resp: 18 (02/15/17 0700)  BP: 115/63 (02/15/17 0700)  SpO2: 98 % (02/15/17 0700) Vital Signs (24h Range):  Temp:  [97.9 °F (36.6 °C)-98.2 °F (36.8 °C)] 97.9 °F (36.6 °C)  Pulse:  [] 88  Resp:  [18-20] 18  SpO2:  [93 %-99 %] 98 %  BP: (111-132)/(63-72) 115/63     Weight: 81.6 kg (180 lb)  Body mass index is 32.92 kg/(m^2).    Physical Exam   Constitutional: She appears well-developed.  Non-toxic appearance. She does not have a sickly appearance.   Ill appearing, moderate distress from back pain, nausea     HENT:   Head: Normocephalic and atraumatic.   Right Ear: Hearing normal.   Left Ear: Hearing normal.   Mouth/Throat: Oropharynx is clear and moist and mucous membranes are normal.   Eyes: Conjunctivae and EOM are normal. Pupils are equal, round, and reactive to light.   Neck: Normal range of motion. Neck supple. Carotid bruit is not present. No Brudzinski's sign and no Kernig's sign noted.   Cardiovascular: Normal rate, regular rhythm, S1 normal, S2 normal, normal heart sounds, intact distal pulses  and normal pulses.  Exam reveals no gallop and no S3.    No murmur heard.  Pulmonary/Chest: Breath sounds normal. No accessory muscle usage. No respiratory distress. She has no wheezes. She has no rales.   Abdominal: Soft. Normal appearance and bowel sounds are normal. She exhibits no distension and no pulsatile midline mass. There is no hepatosplenomegaly. There is no tenderness.   Musculoskeletal: Normal range of motion.   Neurological: She is alert. She has normal strength and normal reflexes. No cranial nerve deficit or sensory deficit. GCS eye subscore is 4. GCS verbal subscore is 5. GCS motor subscore is 6.   Skin: Skin is warm, dry and intact. No rash noted.   Psychiatric: She has a normal mood and affect. Her speech is normal and behavior is normal. Judgment and thought content normal. Cognition and memory are normal.   Nursing note and vitals reviewed.       Significant Labs:   Recent Lab Results       02/15/17  0522      Baso # 0.00     Basophil% 0.0     Differential Method Automated     Eos # 0.0     Eosinophil% 0.0     Gran # 1.6(L)     Gran% 81.0(H)     Hematocrit 24.4(L)     Hemoglobin 7.8(L)     Lymph # 0.2(L)     Lymph% 11.0(L)     MCH 27.4     MCHC 32.0     MCV 86     Mono # 0.1(L)     Mono% 7.0     MPV 10.6     Platelets 108(L)     RBC 2.85(L)     RDW 16.8(H)     WBC 2.00(L)             Significant Imaging: I have reviewed and interpreted all pertinent imaging results/findings within the past 24 hours.

## 2017-02-15 NOTE — ASSESSMENT & PLAN NOTE
Widespread malignancy of unknown primary.  Grave prognosis.  Does not want hospice.  Will get port in to start chemo asap, but she is so ill she may not tolerate it    Probably home in am after speaking with main campus heme onc.  She may need to be transferred there

## 2017-02-15 NOTE — PHYSICIAN QUERY
PT Name: Kat Corley  MR #: [id number]     Physician Query Form - Documentation Clarification    Reviewer Carol Pulido [email or web address]    This form is a permanent document in the medical record.     Query Date: February 15, 2017  By submitting this query, we are merely seeking further clarification of documentation to reflect the severity of illness of your patient. Please utilize your independent clinical judgment when addressing the question(s) below.    (The Medical record reflects the following:)      Supporting Clinical Findings Location in Medical Record   Multiple osteolytic bone and bilateral adrenals metastasis,  Mammograms and ultrasound of the breast performed on January showed multiple bilateral nodules in the breast most consistent with metastatic disease       Consults 2-6   Widely metastatic carcinoma most consistent with breast ca      Cancer adenocarcinoma unknown primary pn 2-10        Discharge summary                                                                            Doctor, Please specify if f metastasis adenocarcinoma of  the breast is a confirmed diagnosis.   Thank You     Physician Use Only                           [  ] yes  [  ] no  [  ] other                                                                                                      [  ] Unable to determine

## 2017-02-15 NOTE — PLAN OF CARE
Problem: Patient Care Overview  Goal: Plan of Care Review  Outcome: Ongoing (interventions implemented as appropriate)  Pt free of trauma, falls, and injury. Pt VSS and afebrile throughout shift. Pt free of skin breakdown. Pt pain has been moderately controlled by PO/IV pain meds and tolerated well. Pt has been  voiding adequately throughout shift. Purposeful rounding done. Pt has call light in reach,  bed brakes on, side rails up x2, bed in low position, pt refused SCDs,/TEDs,and nonskid socks on. Pt lying in bed in no distress. Will continue to monitor.

## 2017-02-15 NOTE — PLAN OF CARE
Problem: Radiation, External Beam (Adult)  Goal: Signs and Symptoms of Listed Potential Problems Will be Absent, Minimized or Managed (Radiation, External Beam)  Signs and symptoms of listed potential problems will be absent, minimized or managed by discharge/transition of care (reference Radiation, External Beam (Adult) CPG).   Outcome: Outcome(s) achieved Date Met:  02/15/17  Radiation completed on 2/13/17

## 2017-02-15 NOTE — CONSULTS
Ortho SPine    47yo F recently Dx w AdenoCa with unknown primary. Took a fall w worsening back pain. Imaging conssitent w widespread osteolytic metastatic disease of the spine with pathologic Frxs T6 and T10. ORtho Consulted for eval.  Denies any bowel or bladder incontinence. No lower extremity weakness.    Review of patient's allergies indicates:  No Known Allergies           Past Medical History   Diagnosis Date    Cancer      Neuropathy              Past Surgical History   Procedure Laterality Date    Tonsillectomy         section        Tubal ligation              Family History      None                Social History Main Topics    Smoking status: Current Every Day Smoker       Packs/day: 0.50       Years: 15.00       Types: Cigarettes    Smokeless tobacco: Not on file    Alcohol use No    Drug use: Not on file    Sexual activity: Yes        Review of Systems   Constitutional: Positive for activity change, appetite change and unexpected weight change. Negative for chills, fatigue and fever.   HENT: Negative for congestion, ear pain, hearing loss, mouth sores, nosebleeds, postnasal drip, rhinorrhea, sinus pressure, sneezing, trouble swallowing and voice change.   Eyes: Negative for redness and visual disturbance.   Respiratory: Positive for shortness of breath. Negative for cough, chest tightness and wheezing.   Cardiovascular: Positive for chest pain and leg swelling. Negative for palpitations.   Gastrointestinal: Negative for abdominal distention, abdominal pain, blood in stool, constipation, diarrhea, nausea and vomiting.   Genitourinary: Negative for decreased urine volume, difficulty urinating, dysuria, frequency, hematuria and urgency.   Musculoskeletal: Positive for arthralgias and back pain. Negative for joint swelling, myalgias, neck pain and neck stiffness.   Skin: Negative for color change, pallor, rash and wound.   Neurological: Negative for dizziness, weakness, numbness and  headaches.   Hematological: Negative for adenopathy. Does not bruise/bleed easily.   Psychiatric/Behavioral: Positive for dysphoric mood and sleep disturbance. The patient is nervous/anxious.      EXAm: Mdoerate ttp over mid-lower thoracic spine midline. She has pain with twisting and turning.  She is NVI L2-S1. Able to ambulate practically w/o assistive devices.  SILT L2-S1  No ankle clonus.  Down-going Babinski  Normal DTRs    CT T/L spine 2/13/17:  Findings: Diffuse, lytic osseous metastases are identified.  Since the recent exam, there has been interval development of pathologic fractures at T6 and T10 which occupy approximately 50% of the vertebral body.  Incidental note is made of a healing pathologic rib fracture posteriorly on the right at T12.  There is a central canal or neuroforaminal stenosis.  Visualized portions of the lungs and visualized hollow abdominal organs show no abnormalities.    A/P: 46yoF w metastatic AdenoCa(unknown 1ary?) and pathologic fractures.  She is neurovascular intact and ambulatory. Discussed possible kyphoplasty for pain relief but this will be difficult given her widespread disease. The only benefit of procedure will be to obtain biopsy of the lesions. APtient not interested at his time. Will order TLSO brace for her to wear x 2 months. F/u in 1-2 weeks.

## 2017-02-15 NOTE — PROCEDURES
Radiology Post-Procedure Note    Pre Op Diagnosis: metastatic breast cancer  Post Op Diagnosis: Same    Procedure: port placement    Procedure performed by: Kevin    Written Informed Consent Obtained: Yes  Specimen Removed: NO  Estimated Blood Loss: Minimal    Findings:   Successful R arm port placement.  Port is accessed and may be used immediately.  If port is not needed for access, please deaccess and load with the appropriate heparin solution (500u/cc).  Please call with questions.    Patient tolerated procedure well.    @SIG@

## 2017-02-15 NOTE — PROGRESS NOTES
[facility] Hospital Medicine  Progress Note    Patient Name: Kat Corley  MRN: [id number]  Patient Class: IP- Inpatient   Admission Date: 2/13/2017  Length of Stay: 2 days  Attending Physician: Jere Gracia MD  Primary Care Provider: NAMITA Guerin        Subjective:     Principal Problem:Vertebral compression fracture    HPI:  The patient is a 46 y.o. female with unknown cancer etiology who presents to the ED with an acute onset of mid back pain after falling down the stairs yesterday, bilateral LE swelling, and SOB. She had radiation therapy earlier this morning, whom the doctors said she should go to the ER to get it checked out. The patient takes Morphine and Oxycodone for her chronic pain. She also experienced 2 episodes of vomiting last night. The patient denies history of LE blood clot, chest pain, or any other symptoms at this time. No pertinent SHx noted. No known drug allergies noted    Hospital Course:  Had fall at home directly after discharge last week.  Now with severe back pain and pathologic fractures at T6 and T10  Yet to start chemo.  Port put in today  Pain better controlled, eating some      Interval History:     Review of Systems   Constitutional: Positive for activity change, appetite change and fatigue. Negative for chills, fever and unexpected weight change.   HENT: Negative for dental problem, ear pain and trouble swallowing.    Eyes: Negative.    Respiratory: Negative for cough, shortness of breath and wheezing.    Cardiovascular: Negative for chest pain and leg swelling.   Gastrointestinal: Positive for diarrhea. Negative for abdominal distention, constipation and vomiting.   Endocrine: Negative for polydipsia and polyphagia.   Genitourinary: Negative for dysuria and flank pain.   Musculoskeletal: Positive for back pain. Negative for arthralgias, myalgias, neck pain and neck stiffness.   Skin: Negative for rash.   Allergic/Immunologic: Negative.     Neurological: Negative for syncope, weakness and headaches.   Psychiatric/Behavioral: Negative for agitation and behavioral problems.   All other systems reviewed and are negative.    Objective:     Vital Signs (Most Recent):  Temp: 98.2 °F (36.8 °C) (02/15/17 1500)  Pulse: 84 (02/15/17 1500)  Resp: 18 (02/15/17 1500)  BP: (!) 140/79 (02/15/17 1500)  SpO2: 95 % (02/15/17 1500) Vital Signs (24h Range):  Temp:  [97.9 °F (36.6 °C)-98.3 °F (36.8 °C)] 98.2 °F (36.8 °C)  Pulse:  [] 84  Resp:  [12-29] 18  SpO2:  [93 %-99 %] 95 %  BP: (115-173)/() 140/79     Weight: 81.6 kg (180 lb)  Body mass index is 32.92 kg/(m^2).    Intake/Output Summary (Last 24 hours) at 02/15/17 1731  Last data filed at 02/15/17 0631   Gross per 24 hour   Intake          2729.17 ml   Output              200 ml   Net          2529.17 ml      Physical Exam   Constitutional: She appears well-developed.  Non-toxic appearance. She does not have a sickly appearance.   Ill appearing, moderate distress from back pain, nausea     HENT:   Head: Normocephalic and atraumatic.   Right Ear: Hearing normal.   Left Ear: Hearing normal.   Mouth/Throat: Oropharynx is clear and moist and mucous membranes are normal.   Eyes: Conjunctivae and EOM are normal. Pupils are equal, round, and reactive to light.   Neck: Normal range of motion. Neck supple. Carotid bruit is not present. No Brudzinski's sign and no Kernig's sign noted.   Cardiovascular: Normal rate, regular rhythm, S1 normal, S2 normal, normal heart sounds, intact distal pulses and normal pulses.  Exam reveals no gallop and no S3.    No murmur heard.  Pulmonary/Chest: Breath sounds normal. No accessory muscle usage. No respiratory distress. She has no wheezes. She has no rales.   Abdominal: Soft. Normal appearance and bowel sounds are normal. She exhibits no distension and no pulsatile midline mass. There is no hepatosplenomegaly. There is no tenderness.   Musculoskeletal: Normal range of motion.    Neurological: She is alert. She has normal strength and normal reflexes. No cranial nerve deficit or sensory deficit. GCS eye subscore is 4. GCS verbal subscore is 5. GCS motor subscore is 6.   Skin: Skin is warm, dry and intact. No rash noted.   Psychiatric: She has a normal mood and affect. Her speech is normal and behavior is normal. Judgment and thought content normal. Cognition and memory are normal.   Nursing note and vitals reviewed.      Significant Labs: All pertinent labs within the past 24 hours have been reviewed.    Significant Imaging: I have reviewed and interpreted all pertinent imaging results/findings within the past 24 hours.    Assessment/Plan:      * Vertebral compression fracture  Pathologic at T6 and T10.  TLSO brace ordered.  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  Does not want hospice.  Will get port in to start chemo asap, but she is so ill she may not tolerate it      Acute pulmonary embolism  Small in size.  On full dose lovenox, switch to apixaban today      VTE Risk Mitigation         Ordered     Medium Risk of VTE  Once      02/13/17 1445     Place KAREN hose  Until discontinued      02/13/17 1445     Place sequential compression device  Until discontinued      02/13/17 1445          Aston Amaya PA-C  Department of Hospital Medicine   [facility]

## 2017-02-15 NOTE — CONSULTS
Consult/H&P Note  Interventional Radiology    Consult Requested By: Jeff    Reason for Consult: metastatic breast cancer    SUBJECTIVE:     Chief Complaint: metastatic breast cancer    History of Present Illness: 47yo female with extensively metastatic breast cancer, recently readmitted with compression fractures and small PE    Past Medical History   Diagnosis Date    Cancer     Neuropathy      Past Surgical History   Procedure Laterality Date    Tonsillectomy       section      Tubal ligation       History reviewed. No pertinent family history.  Social History   Substance Use Topics    Smoking status: Current Every Day Smoker     Packs/day: 0.50     Years: 15.00     Types: Cigarettes    Smokeless tobacco: None    Alcohol use No       Review of Systems:  As per H&P      OBJECTIVE:     Vital Signs Range (Last 24H):  Temp:  [97.9 °F (36.6 °C)-98.2 °F (36.8 °C)]   Pulse:  []   Resp:  [18-20]   BP: (111-132)/(63-72)   SpO2:  [93 %-99 %]     Physical Exam:  General- Patient alert and oriented x3 in NAD  ENT- PERRLA,  Neck- No masses  CV- Regular rate and rhythm  Resp-  No increased WOB  GI- Non tender/non-distended  Extrem- back pain No cyanosis, clubbing, edema.   Derm- No rashes, masses, or lesions noted  Neuro-  No focal deficits noted.     Physical Exam  Body mass index is 32.92 kg/(m^2).    Scheduled Meds:    apixaban  5 mg Oral BID    cyanocobalamin  250 mcg Oral Daily    dexamethasone  8 mg Oral Q12H    fentaNYL  1 patch Transdermal Q72H    gabapentin  300 mg Oral TID    morphine  30 mg Oral Q12H    multivitamin  1 tablet Oral Daily    nicotine  1 patch Transdermal Daily    polyethylene glycol  17 g Oral Daily    pyridoxine (vitamin B6)  100 mg Oral Daily    senna-docusate 8.6-50 mg  1 tablet Oral Daily     Continuous Infusions:    PRN Meds:acetaminophen, HYDROmorphone, loperamide, metoclopramide HCl, ondansetron, oxycodone    Allergies: Review of patient's allergies  indicates:  No Known Allergies    Labs:  No results for input(s): INR in the last 168 hours.    Invalid input(s):  PT,  PTT    Recent Labs  Lab 02/15/17  0522   WBC 2.00*   HGB 7.8*   HCT 24.4*   MCV 86   *      Recent Labs  Lab 02/13/17  1301 02/14/17  0500   GLU 98 144*    140   K 3.4* 3.4*    103   CO2 28 26   BUN 6 6   CREATININE 0.6 0.5   CALCIUM 8.3* 7.3*   MG  --  1.5*   ALT 17  --    AST 21  --    ALBUMIN 2.2*  --    BILITOT 0.8  --        Vitals (Most Recent):  Temp: 97.9 °F (36.6 °C) (02/15/17 0705)  Pulse: 88 (02/15/17 0705)  Resp: 18 (02/15/17 0705)  BP: 115/63 (02/15/17 0705)  SpO2: 98 % (02/15/17 0705)    ASA: 3  Mallampati: 2    Consent obtained    ASSESSMENT/PLAN:     R arm port placement with moderate sedation.    Active Hospital Problems    Diagnosis  POA    *Vertebral compression fracture [M48.50XA]  Yes    Acute pulmonary embolism [I26.99]  Yes    Anemia [D64.9]  Yes    Cancer [C80.1]  Yes    COPD (chronic obstructive pulmonary disease) [J44.9]  Yes      Resolved Hospital Problems    Diagnosis Date Resolved POA   No resolved problems to display.           Ronaldo Brown MD

## 2017-02-15 NOTE — H&P
[facility] Hospital Medicine  History & Physical    Patient Name: Kat Corley  MRN: [id number]  Admission Date: 2017  Attending Physician: Jere Gracia MD   Primary Care Provider: NAMITA Guerin         Patient information was obtained from patient and ER records.     Subjective:     Principal Problem:Vertebral compression fracture    Chief Complaint:   Chief Complaint   Patient presents with    Fall     Patient had a trip and fall yesterday.  Patient c/o generalized body pain.  Denies LOC.  Patient feeling nauseated. No dizziness.     Leg Swelling     Patient c/o bilateral lower leg swelling for 2 weeks.         HPI: The patient is a 46 y.o. female with unknown cancer etiology who presents to the ED with an acute onset of mid back pain after falling down the stairs yesterday, bilateral LE swelling, and SOB. She had radiation therapy earlier this morning, whom the doctors said she should go to the ER to get it checked out. The patient takes Morphine and Oxycodone for her chronic pain. She also experienced 2 episodes of vomiting last night. The patient denies history of LE blood clot, chest pain, or any other symptoms at this time. No pertinent SHx noted. No known drug allergies noted    Past Medical History   Diagnosis Date    Cancer     Neuropathy        Past Surgical History   Procedure Laterality Date    Tonsillectomy       section      Tubal ligation         Review of patient's allergies indicates:  No Known Allergies    No current facility-administered medications on file prior to encounter.      Current Outpatient Prescriptions on File Prior to Encounter   Medication Sig    cyanocobalamin (VITAMIN B-12) 100 MCG tablet Take 100 mcg by mouth once daily.    dexamethasone (DECADRON) 4 MG Tab Take 2 tablets (8 mg total) by mouth every 12 (twelve) hours.    fentaNYL (DURAGESIC) 75 mcg/hr Place 1 patch onto the skin every 72 hours.    gabapentin (NEURONTIN) 300 MG  capsule Take 300 mg by mouth 3 (three) times daily.    morphine (MS CONTIN) 30 MG 12 hr tablet Take 1 tablet (30 mg total) by mouth every 12 (twelve) hours.    multivitamin (ONE DAILY MULTIVITAMIN) per tablet Take 1 tablet by mouth once daily.    nicotine (NICODERM CQ) 14 mg/24 hr Place 1 patch onto the skin once daily.    ondansetron (ZOFRAN-ODT) 4 MG TbDL Take 1 tablet (4 mg total) by mouth every 8 (eight) hours as needed.    oxycodone (ROXICODONE) 10 mg Tab immediate release tablet Take 1.5 tablets (15 mg total) by mouth every 4 (four) hours as needed for Pain.    polyethylene glycol (GLYCOLAX) 17 gram PwPk Take 17 g by mouth once daily.    pyridoxine, vitamin B6, (VITAMIN B-6) 100 MG Tab Take 100 mg by mouth once daily.    senna-docusate 8.6-50 mg (PERICOLACE) 8.6-50 mg per tablet Take 1 tablet by mouth once daily.     Family History     None        Social History Main Topics    Smoking status: Current Every Day Smoker     Packs/day: 0.50     Years: 15.00     Types: Cigarettes    Smokeless tobacco: Not on file    Alcohol use No    Drug use: Not on file    Sexual activity: Yes     Review of Systems   Constitutional: Positive for activity change, appetite change and fatigue. Negative for chills, fever and unexpected weight change.   HENT: Negative for dental problem, ear pain and trouble swallowing.    Eyes: Negative.    Respiratory: Negative for cough, shortness of breath and wheezing.    Cardiovascular: Negative for chest pain and leg swelling.   Gastrointestinal: Positive for diarrhea. Negative for abdominal distention, constipation and vomiting.   Endocrine: Negative for polydipsia and polyphagia.   Genitourinary: Negative for dysuria and flank pain.   Musculoskeletal: Positive for back pain. Negative for arthralgias, myalgias, neck pain and neck stiffness.   Skin: Negative for rash.   Allergic/Immunologic: Negative.    Neurological: Negative for syncope, weakness and headaches.    Psychiatric/Behavioral: Negative for agitation and behavioral problems.   All other systems reviewed and are negative.    Objective:     Vital Signs (Most Recent):  Temp: 97.9 °F (36.6 °C) (02/15/17 0700)  Pulse: 88 (02/15/17 0700)  Resp: 18 (02/15/17 0700)  BP: 115/63 (02/15/17 0700)  SpO2: 98 % (02/15/17 0700) Vital Signs (24h Range):  Temp:  [97.9 °F (36.6 °C)-98.2 °F (36.8 °C)] 97.9 °F (36.6 °C)  Pulse:  [] 88  Resp:  [18-20] 18  SpO2:  [93 %-99 %] 98 %  BP: (111-132)/(63-72) 115/63     Weight: 81.6 kg (180 lb)  Body mass index is 32.92 kg/(m^2).    Physical Exam   Constitutional: She appears well-developed.  Non-toxic appearance. She does not have a sickly appearance.   Ill appearing, moderate distress from back pain, nausea     HENT:   Head: Normocephalic and atraumatic.   Right Ear: Hearing normal.   Left Ear: Hearing normal.   Mouth/Throat: Oropharynx is clear and moist and mucous membranes are normal.   Eyes: Conjunctivae and EOM are normal. Pupils are equal, round, and reactive to light.   Neck: Normal range of motion. Neck supple. Carotid bruit is not present. No Brudzinski's sign and no Kernig's sign noted.   Cardiovascular: Normal rate, regular rhythm, S1 normal, S2 normal, normal heart sounds, intact distal pulses and normal pulses.  Exam reveals no gallop and no S3.    No murmur heard.  Pulmonary/Chest: Breath sounds normal. No accessory muscle usage. No respiratory distress. She has no wheezes. She has no rales.   Abdominal: Soft. Normal appearance and bowel sounds are normal. She exhibits no distension and no pulsatile midline mass. There is no hepatosplenomegaly. There is no tenderness.   Musculoskeletal: Normal range of motion.   Neurological: She is alert. She has normal strength and normal reflexes. No cranial nerve deficit or sensory deficit. GCS eye subscore is 4. GCS verbal subscore is 5. GCS motor subscore is 6.   Skin: Skin is warm, dry and intact. No rash noted.   Psychiatric: She  has a normal mood and affect. Her speech is normal and behavior is normal. Judgment and thought content normal. Cognition and memory are normal.   Nursing note and vitals reviewed.       Significant Labs:   Recent Lab Results       02/15/17  0522      Baso # 0.00     Basophil% 0.0     Differential Method Automated     Eos # 0.0     Eosinophil% 0.0     Gran # 1.6(L)     Gran% 81.0(H)     Hematocrit 24.4(L)     Hemoglobin 7.8(L)     Lymph # 0.2(L)     Lymph% 11.0(L)     MCH 27.4     MCHC 32.0     MCV 86     Mono # 0.1(L)     Mono% 7.0     MPV 10.6     Platelets 108(L)     RBC 2.85(L)     RDW 16.8(H)     WBC 2.00(L)             Significant Imaging: I have reviewed and interpreted all pertinent imaging results/findings within the past 24 hours.    Assessment/Plan:     * Vertebral compression fracture  Pathologic at T6 and T10.  TLSO brace ordered.  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  Does not want hospice.  Will get port in to start chemo asap, but she is so ill she may not tolerate it      Acute pulmonary embolism  Small in size.  On full dose lovenox, switch to apixaban today      Anemia  Likely chronic disease.  Will check stool for occult blood      VTE Risk Mitigation         Ordered     Medium Risk of VTE  Once      02/13/17 1445     Place KAREN hose  Until discontinued      02/13/17 1445     Place sequential compression device  Until discontinued      02/13/17 1445        Aston Amaya PA-C  Department of Hospital Medicine   [facility]

## 2017-02-15 NOTE — SUBJECTIVE & OBJECTIVE
Interval History:     Review of Systems   Constitutional: Positive for activity change, appetite change and fatigue. Negative for chills, fever and unexpected weight change.   HENT: Negative for dental problem, ear pain and trouble swallowing.    Eyes: Negative.    Respiratory: Negative for cough, shortness of breath and wheezing.    Cardiovascular: Negative for chest pain and leg swelling.   Gastrointestinal: Positive for diarrhea. Negative for abdominal distention, constipation and vomiting.   Endocrine: Negative for polydipsia and polyphagia.   Genitourinary: Negative for dysuria and flank pain.   Musculoskeletal: Positive for back pain. Negative for arthralgias, myalgias, neck pain and neck stiffness.   Skin: Negative for rash.   Allergic/Immunologic: Negative.    Neurological: Negative for syncope, weakness and headaches.   Psychiatric/Behavioral: Negative for agitation and behavioral problems.   All other systems reviewed and are negative.    Objective:     Vital Signs (Most Recent):  Temp: 98.2 °F (36.8 °C) (02/15/17 1500)  Pulse: 84 (02/15/17 1500)  Resp: 18 (02/15/17 1500)  BP: (!) 140/79 (02/15/17 1500)  SpO2: 95 % (02/15/17 1500) Vital Signs (24h Range):  Temp:  [97.9 °F (36.6 °C)-98.3 °F (36.8 °C)] 98.2 °F (36.8 °C)  Pulse:  [] 84  Resp:  [12-29] 18  SpO2:  [93 %-99 %] 95 %  BP: (115-173)/() 140/79     Weight: 81.6 kg (180 lb)  Body mass index is 32.92 kg/(m^2).    Intake/Output Summary (Last 24 hours) at 02/15/17 1731  Last data filed at 02/15/17 0631   Gross per 24 hour   Intake          2729.17 ml   Output              200 ml   Net          2529.17 ml      Physical Exam   Constitutional: She appears well-developed.  Non-toxic appearance. She does not have a sickly appearance.   Ill appearing, moderate distress from back pain, nausea     HENT:   Head: Normocephalic and atraumatic.   Right Ear: Hearing normal.   Left Ear: Hearing normal.   Mouth/Throat: Oropharynx is clear and moist and  mucous membranes are normal.   Eyes: Conjunctivae and EOM are normal. Pupils are equal, round, and reactive to light.   Neck: Normal range of motion. Neck supple. Carotid bruit is not present. No Brudzinski's sign and no Kernig's sign noted.   Cardiovascular: Normal rate, regular rhythm, S1 normal, S2 normal, normal heart sounds, intact distal pulses and normal pulses.  Exam reveals no gallop and no S3.    No murmur heard.  Pulmonary/Chest: Breath sounds normal. No accessory muscle usage. No respiratory distress. She has no wheezes. She has no rales.   Abdominal: Soft. Normal appearance and bowel sounds are normal. She exhibits no distension and no pulsatile midline mass. There is no hepatosplenomegaly. There is no tenderness.   Musculoskeletal: Normal range of motion.   Neurological: She is alert. She has normal strength and normal reflexes. No cranial nerve deficit or sensory deficit. GCS eye subscore is 4. GCS verbal subscore is 5. GCS motor subscore is 6.   Skin: Skin is warm, dry and intact. No rash noted.   Psychiatric: She has a normal mood and affect. Her speech is normal and behavior is normal. Judgment and thought content normal. Cognition and memory are normal.   Nursing note and vitals reviewed.      Significant Labs: All pertinent labs within the past 24 hours have been reviewed.    Significant Imaging: I have reviewed and interpreted all pertinent imaging results/findings within the past 24 hours.

## 2017-02-16 NOTE — TELEPHONE ENCOUNTER
----- Message from Lyla Ramírez RN sent at 2/16/2017  2:14 PM CST -----  Thank you so much betsy!!!!  ----- Message -----     From: Garrison Silva     Sent: 2/16/2017   1:30 PM       To: Lyla Ramírez RN    Done, Thanks   ----- Message -----     From: Lyla Ramírez RN     Sent: 2/16/2017   9:05 AM       To: Garrison Silva    Please put on Dr. Yeh's schedule for 9 (I blocked it for him) and add it to Dr. Rasmussen schedule for 9:30.  You can double book him then.

## 2017-02-16 NOTE — ASSESSMENT & PLAN NOTE
Widespread malignancy of unknown primary.  Grave prognosis.  Does not want hospice.  Will get port in to start chemo asap, but she is so ill she may not tolerate it    To start chemo Monday.  Too sick to go home today

## 2017-02-16 NOTE — PLAN OF CARE
Problem: Patient Care Overview  Goal: Plan of Care Review  Outcome: Ongoing (interventions implemented as appropriate)  Patient received on room air with adequate saturation.

## 2017-02-16 NOTE — NURSING
When patient return from port placement, nurse from specials called to report oozing at the site of port placement.

## 2017-02-16 NOTE — PLAN OF CARE
Problem: Patient Care Overview  Goal: Plan of Care Review  Outcome: Ongoing (interventions implemented as appropriate)  Patient in no apparent distress. Sat's  93 % on room air . Will continue to monitor.

## 2017-02-16 NOTE — SUBJECTIVE & OBJECTIVE
Interval History:     Review of Systems   Constitutional: Positive for activity change, appetite change and fatigue. Negative for chills, fever and unexpected weight change.   HENT: Negative for dental problem, ear pain and trouble swallowing.    Eyes: Negative.    Respiratory: Negative for cough, shortness of breath and wheezing.    Cardiovascular: Negative for chest pain and leg swelling.   Gastrointestinal: Positive for diarrhea. Negative for abdominal distention, constipation and vomiting.   Endocrine: Negative for polydipsia and polyphagia.   Genitourinary: Negative for dysuria and flank pain.   Musculoskeletal: Positive for back pain. Negative for arthralgias, myalgias, neck pain and neck stiffness.   Skin: Negative for rash.   Allergic/Immunologic: Negative.    Neurological: Negative for syncope, weakness and headaches.   Psychiatric/Behavioral: Negative for agitation and behavioral problems.   All other systems reviewed and are negative.    Objective:     Vital Signs (Most Recent):  Temp: 97.6 °F (36.4 °C) (02/16/17 1207)  Pulse: 66 (02/16/17 1207)  Resp: 18 (02/16/17 1207)  BP: 137/69 (02/16/17 1207)  SpO2: 96 % (02/16/17 1207) Vital Signs (24h Range):  Temp:  [97.6 °F (36.4 °C)-98.2 °F (36.8 °C)] 97.6 °F (36.4 °C)  Pulse:  [66-84] 66  Resp:  [18-20] 18  SpO2:  [93 %-96 %] 96 %  BP: (134-143)/(65-79) 137/69     Weight: 81.6 kg (180 lb)  Body mass index is 32.92 kg/(m^2).    Intake/Output Summary (Last 24 hours) at 02/16/17 1626  Last data filed at 02/15/17 1636   Gross per 24 hour   Intake              360 ml   Output                0 ml   Net              360 ml      Physical Exam   Constitutional: She appears well-developed.  Non-toxic appearance. She does not have a sickly appearance.   Ill appearing, moderate distress from back pain, nausea     HENT:   Head: Normocephalic and atraumatic.   Right Ear: Hearing normal.   Left Ear: Hearing normal.   Mouth/Throat: Oropharynx is clear and moist and mucous  membranes are normal.   Eyes: Conjunctivae and EOM are normal. Pupils are equal, round, and reactive to light.   Neck: Normal range of motion. Neck supple. Carotid bruit is not present. No Brudzinski's sign and no Kernig's sign noted.   Cardiovascular: Normal rate, regular rhythm, S1 normal, S2 normal, normal heart sounds, intact distal pulses and normal pulses.  Exam reveals no gallop and no S3.    No murmur heard.  Pulmonary/Chest: Breath sounds normal. No accessory muscle usage. No respiratory distress. She has no wheezes. She has no rales.   Abdominal: Soft. Normal appearance and bowel sounds are normal. She exhibits no distension and no pulsatile midline mass. There is no hepatosplenomegaly. There is no tenderness.   Musculoskeletal: Normal range of motion.   Neurological: She is alert. She has normal strength and normal reflexes. No cranial nerve deficit or sensory deficit. GCS eye subscore is 4. GCS verbal subscore is 5. GCS motor subscore is 6.   Skin: Skin is warm, dry and intact. No rash noted.   Psychiatric: She has a normal mood and affect. Her speech is normal and behavior is normal. Judgment and thought content normal. Cognition and memory are normal.   Nursing note and vitals reviewed.      Significant Labs: All pertinent labs within the past 24 hours have been reviewed.    Significant Imaging: I have reviewed and interpreted all pertinent imaging results/findings within the past 24 hours.

## 2017-02-16 NOTE — PLAN OF CARE
Problem: Patient Care Overview  Goal: Plan of Care Review  Outcome: Ongoing (interventions implemented as appropriate)  Patient free of trauma, falls, and injury, no skin breakdown. Vital signs stable and afebrile throughout shift. Dressing to RUE agreed to be changed by PM shift nurse, Gina, as discussed at bedside reporting. Pain has been mildly controlled by PO & IV pain meds and tolerated well. Constant  Tolerating PO intake and voiding spontaneously. Ambulating without difficulty. Call light is within reach, bed alarm on, bed in lowest position with brakes on, side rails up x2, family at bedside,  and nonskid socks on. Pt lying in bed in no distress.

## 2017-02-16 NOTE — PROGRESS NOTES
[facility] Hospital Medicine  Progress Note    Patient Name: Kat Corley  MRN: [id number]  Patient Class: IP- Inpatient   Admission Date: 2/13/2017  Length of Stay: 3 days  Attending Physician: Jere Gracia MD  Primary Care Provider: NAMITA Guerin        Subjective:     Principal Problem:Vertebral compression fracture    HPI:  The patient is a 46 y.o. female with unknown cancer etiology who presents to the ED with an acute onset of mid back pain after falling down the stairs yesterday, bilateral LE swelling, and SOB. She had radiation therapy earlier this morning, whom the doctors said she should go to the ER to get it checked out. The patient takes Morphine and Oxycodone for her chronic pain. She also experienced 2 episodes of vomiting last night. The patient denies history of LE blood clot, chest pain, or any other symptoms at this time. No pertinent SHx noted. No known drug allergies noted    Hospital Course:  Had fall at home directly after discharge last week.  Now with severe back pain and pathologic fractures at T6 and T10  Yet to start chemo.  Kenan elaine inyesterday  Spoke with Dr eYh from [facility] onc at [facility], chemo scheduled for Monday    Pain, nausea worse again today despite escalation in analgesics      Interval History:     Review of Systems   Constitutional: Positive for activity change, appetite change and fatigue. Negative for chills, fever and unexpected weight change.   HENT: Negative for dental problem, ear pain and trouble swallowing.    Eyes: Negative.    Respiratory: Negative for cough, shortness of breath and wheezing.    Cardiovascular: Negative for chest pain and leg swelling.   Gastrointestinal: Positive for diarrhea. Negative for abdominal distention, constipation and vomiting.   Endocrine: Negative for polydipsia and polyphagia.   Genitourinary: Negative for dysuria and flank pain.   Musculoskeletal: Positive for back pain. Negative for arthralgias,  myalgias, neck pain and neck stiffness.   Skin: Negative for rash.   Allergic/Immunologic: Negative.    Neurological: Negative for syncope, weakness and headaches.   Psychiatric/Behavioral: Negative for agitation and behavioral problems.   All other systems reviewed and are negative.    Objective:     Vital Signs (Most Recent):  Temp: 97.6 °F (36.4 °C) (02/16/17 1207)  Pulse: 66 (02/16/17 1207)  Resp: 18 (02/16/17 1207)  BP: 137/69 (02/16/17 1207)  SpO2: 96 % (02/16/17 1207) Vital Signs (24h Range):  Temp:  [97.6 °F (36.4 °C)-98.2 °F (36.8 °C)] 97.6 °F (36.4 °C)  Pulse:  [66-84] 66  Resp:  [18-20] 18  SpO2:  [93 %-96 %] 96 %  BP: (134-143)/(65-79) 137/69     Weight: 81.6 kg (180 lb)  Body mass index is 32.92 kg/(m^2).    Intake/Output Summary (Last 24 hours) at 02/16/17 1626  Last data filed at 02/15/17 1636   Gross per 24 hour   Intake              360 ml   Output                0 ml   Net              360 ml      Physical Exam   Constitutional: She appears well-developed.  Non-toxic appearance. She does not have a sickly appearance.   Ill appearing, moderate distress from back pain, nausea     HENT:   Head: Normocephalic and atraumatic.   Right Ear: Hearing normal.   Left Ear: Hearing normal.   Mouth/Throat: Oropharynx is clear and moist and mucous membranes are normal.   Eyes: Conjunctivae and EOM are normal. Pupils are equal, round, and reactive to light.   Neck: Normal range of motion. Neck supple. Carotid bruit is not present. No Brudzinski's sign and no Kernig's sign noted.   Cardiovascular: Normal rate, regular rhythm, S1 normal, S2 normal, normal heart sounds, intact distal pulses and normal pulses.  Exam reveals no gallop and no S3.    No murmur heard.  Pulmonary/Chest: Breath sounds normal. No accessory muscle usage. No respiratory distress. She has no wheezes. She has no rales.   Abdominal: Soft. Normal appearance and bowel sounds are normal. She exhibits no distension and no pulsatile midline mass.  There is no hepatosplenomegaly. There is no tenderness.   Musculoskeletal: Normal range of motion.   Neurological: She is alert. She has normal strength and normal reflexes. No cranial nerve deficit or sensory deficit. GCS eye subscore is 4. GCS verbal subscore is 5. GCS motor subscore is 6.   Skin: Skin is warm, dry and intact. No rash noted.   Psychiatric: She has a normal mood and affect. Her speech is normal and behavior is normal. Judgment and thought content normal. Cognition and memory are normal.   Nursing note and vitals reviewed.      Significant Labs: All pertinent labs within the past 24 hours have been reviewed.    Significant Imaging: I have reviewed and interpreted all pertinent imaging results/findings within the past 24 hours.    Assessment/Plan:      * Vertebral compression fracture  Pathologic at T6 and T10.  TLSO brace ordered.  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  Does not want hospice.  Will get port in to start chemo asap, but she is so ill she may not tolerate it    To start chemo Monday.  Too sick to go home today      Acute pulmonary embolism  Small in size.  On apixaban    Anemia  Likely chronic disease.  Will check stool for occult blood      VTE Risk Mitigation         Ordered     Medium Risk of VTE  Once      02/13/17 1445     Place KAREN hose  Until discontinued      02/13/17 1445     Place sequential compression device  Until discontinued      02/13/17 1445          Aston Amaya PA-C  Department of Hospital Medicine   [facility]

## 2017-02-17 NOTE — PLAN OF CARE
Problem: Patient Care Overview  Goal: Plan of Care Review  Outcome: Ongoing (interventions implemented as appropriate)  No changes made at this time. Will continue to monitor.

## 2017-02-17 NOTE — ASSESSMENT & PLAN NOTE
Widespread malignancy of unknown primary.  Grave prognosis.  Does not want hospice.    To start chemo Monday.  Too sick to go home today

## 2017-02-17 NOTE — PLAN OF CARE
VSS. Afebrile. Able to make wants and needs known to staff.  Voiding without difficulty. Tolerating PO without difficulty. IV dressing clean, dry, and intact. Free from falls, injury, and trauma during this shift. Purposeful hourly rounding completed.

## 2017-02-17 NOTE — PROGRESS NOTES
[facility] Hospital Medicine  Progress Note    Patient Name: Kat Corley  MRN: [id number]  Patient Class: IP- Inpatient   Admission Date: 2/13/2017  Length of Stay: 4 days  Attending Physician: Jere Gracia MD  Primary Care Provider: NAMITA Guerin        Subjective:     Principal Problem:Vertebral compression fracture    HPI:  The patient is a 46 y.o. female with unknown cancer etiology who presents to the ED with an acute onset of mid back pain after falling down the stairs yesterday, bilateral LE swelling, and SOB. She had radiation therapy earlier this morning, whom the doctors said she should go to the ER to get it checked out. The patient takes Morphine and Oxycodone for her chronic pain. She also experienced 2 episodes of vomiting last night. The patient denies history of LE blood clot, chest pain, or any other symptoms at this time. No pertinent SHx noted. No known drug allergies noted    Hospital Course:  Had fall at home directly after discharge last week.  Now with severe back pain and pathologic fractures at T6 and T10  Yet to start chemo.  Kenan henry inyesterday  Spoke with Dr Yeh from [facility] onc at [facility], chemo scheduled for Monday    Pain, nausea worse again today despite escalation in analgesics  Not eating  miserable but not willing to consider hospice      Interval History:     Review of Systems   Constitutional: Positive for activity change, appetite change and fatigue. Negative for chills, fever and unexpected weight change.   HENT: Negative for dental problem, ear pain and trouble swallowing.    Eyes: Negative.    Respiratory: Negative for cough, shortness of breath and wheezing.    Cardiovascular: Negative for chest pain and leg swelling.   Gastrointestinal: Positive for diarrhea. Negative for abdominal distention, constipation and vomiting.   Endocrine: Negative for polydipsia and polyphagia.   Genitourinary: Negative for dysuria and flank pain.    Musculoskeletal: Positive for back pain. Negative for arthralgias, myalgias, neck pain and neck stiffness.   Skin: Negative for rash.   Allergic/Immunologic: Negative.    Neurological: Negative for syncope, weakness and headaches.   Psychiatric/Behavioral: Negative for agitation and behavioral problems.   All other systems reviewed and are negative.    Objective:     Vital Signs (Most Recent):  Temp: 98.1 °F (36.7 °C) (02/17/17 1230)  Pulse: 67 (02/17/17 1230)  Resp: 16 (02/17/17 1230)  BP: 128/70 (02/17/17 1230)  SpO2: 95 % (02/17/17 1230) Vital Signs (24h Range):  Temp:  [97.1 °F (36.2 °C)-98.2 °F (36.8 °C)] 98.1 °F (36.7 °C)  Pulse:  [56-85] 67  Resp:  [16-17] 16  SpO2:  [94 %-98 %] 95 %  BP: (128-166)/(66-74) 128/70     Weight: 81.6 kg (180 lb)  Body mass index is 32.92 kg/(m^2).    Intake/Output Summary (Last 24 hours) at 02/17/17 1523  Last data filed at 02/17/17 1433   Gross per 24 hour   Intake             2640 ml   Output              200 ml   Net             2440 ml      Physical Exam   Constitutional: She appears well-developed.  Non-toxic appearance. She does not have a sickly appearance.   Ill appearing, moderate distress from back pain, nausea     HENT:   Head: Normocephalic and atraumatic.   Right Ear: Hearing normal.   Left Ear: Hearing normal.   Mouth/Throat: Oropharynx is clear and moist and mucous membranes are normal.   Eyes: Conjunctivae and EOM are normal. Pupils are equal, round, and reactive to light.   Neck: Normal range of motion. Neck supple. Carotid bruit is not present. No Brudzinski's sign and no Kernig's sign noted.   Cardiovascular: Normal rate, regular rhythm, S1 normal, S2 normal, normal heart sounds, intact distal pulses and normal pulses.  Exam reveals no gallop and no S3.    No murmur heard.  Pulmonary/Chest: Breath sounds normal. No accessory muscle usage. No respiratory distress. She has no wheezes. She has no rales.   Abdominal: Soft. Normal appearance and bowel sounds are  normal. She exhibits no distension and no pulsatile midline mass. There is no hepatosplenomegaly. There is no tenderness.   Musculoskeletal: Normal range of motion.   Neurological: She is alert. She has normal strength and normal reflexes. No cranial nerve deficit or sensory deficit. GCS eye subscore is 4. GCS verbal subscore is 5. GCS motor subscore is 6.   Skin: Skin is warm, dry and intact. No rash noted.   Psychiatric: She has a normal mood and affect. Her speech is normal and behavior is normal. Judgment and thought content normal. Cognition and memory are normal.   Nursing note and vitals reviewed.      Significant Labs: All pertinent labs within the past 24 hours have been reviewed.    Significant Imaging: I have reviewed and interpreted all pertinent imaging results/findings within the past 24 hours.    Assessment/Plan:      * Vertebral compression fracture  Pathologic at T6 and T10.  TLSO brace ordered.  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  Does not want hospice.    To start chemo Monday.  Too sick to go home today      Acute pulmonary embolism  Small in size.  On apixaban    Anemia  Likely chronic disease.  Will check stool for occult blood      VTE Risk Mitigation         Ordered     Medium Risk of VTE  Once      02/13/17 1445     Place KAREN hose  Until discontinued      02/13/17 1445     Place sequential compression device  Until discontinued      02/13/17 1445          Aston Amaya PA-C  Department of Hospital Medicine   [facility]

## 2017-02-17 NOTE — SUBJECTIVE & OBJECTIVE
Interval History:     Review of Systems   Constitutional: Positive for activity change, appetite change and fatigue. Negative for chills, fever and unexpected weight change.   HENT: Negative for dental problem, ear pain and trouble swallowing.    Eyes: Negative.    Respiratory: Negative for cough, shortness of breath and wheezing.    Cardiovascular: Negative for chest pain and leg swelling.   Gastrointestinal: Positive for diarrhea. Negative for abdominal distention, constipation and vomiting.   Endocrine: Negative for polydipsia and polyphagia.   Genitourinary: Negative for dysuria and flank pain.   Musculoskeletal: Positive for back pain. Negative for arthralgias, myalgias, neck pain and neck stiffness.   Skin: Negative for rash.   Allergic/Immunologic: Negative.    Neurological: Negative for syncope, weakness and headaches.   Psychiatric/Behavioral: Negative for agitation and behavioral problems.   All other systems reviewed and are negative.    Objective:     Vital Signs (Most Recent):  Temp: 98.1 °F (36.7 °C) (02/17/17 1230)  Pulse: 67 (02/17/17 1230)  Resp: 16 (02/17/17 1230)  BP: 128/70 (02/17/17 1230)  SpO2: 95 % (02/17/17 1230) Vital Signs (24h Range):  Temp:  [97.1 °F (36.2 °C)-98.2 °F (36.8 °C)] 98.1 °F (36.7 °C)  Pulse:  [56-85] 67  Resp:  [16-17] 16  SpO2:  [94 %-98 %] 95 %  BP: (128-166)/(66-74) 128/70     Weight: 81.6 kg (180 lb)  Body mass index is 32.92 kg/(m^2).    Intake/Output Summary (Last 24 hours) at 02/17/17 1523  Last data filed at 02/17/17 1433   Gross per 24 hour   Intake             2640 ml   Output              200 ml   Net             2440 ml      Physical Exam   Constitutional: She appears well-developed.  Non-toxic appearance. She does not have a sickly appearance.   Ill appearing, moderate distress from back pain, nausea     HENT:   Head: Normocephalic and atraumatic.   Right Ear: Hearing normal.   Left Ear: Hearing normal.   Mouth/Throat: Oropharynx is clear and moist and mucous  membranes are normal.   Eyes: Conjunctivae and EOM are normal. Pupils are equal, round, and reactive to light.   Neck: Normal range of motion. Neck supple. Carotid bruit is not present. No Brudzinski's sign and no Kernig's sign noted.   Cardiovascular: Normal rate, regular rhythm, S1 normal, S2 normal, normal heart sounds, intact distal pulses and normal pulses.  Exam reveals no gallop and no S3.    No murmur heard.  Pulmonary/Chest: Breath sounds normal. No accessory muscle usage. No respiratory distress. She has no wheezes. She has no rales.   Abdominal: Soft. Normal appearance and bowel sounds are normal. She exhibits no distension and no pulsatile midline mass. There is no hepatosplenomegaly. There is no tenderness.   Musculoskeletal: Normal range of motion.   Neurological: She is alert. She has normal strength and normal reflexes. No cranial nerve deficit or sensory deficit. GCS eye subscore is 4. GCS verbal subscore is 5. GCS motor subscore is 6.   Skin: Skin is warm, dry and intact. No rash noted.   Psychiatric: She has a normal mood and affect. Her speech is normal and behavior is normal. Judgment and thought content normal. Cognition and memory are normal.   Nursing note and vitals reviewed.      Significant Labs: All pertinent labs within the past 24 hours have been reviewed.    Significant Imaging: I have reviewed and interpreted all pertinent imaging results/findings within the past 24 hours.

## 2017-02-18 NOTE — SUBJECTIVE & OBJECTIVE
Interval History:     Review of Systems   Constitutional: Positive for activity change, appetite change and fatigue. Negative for chills, fever and unexpected weight change.   HENT: Negative for dental problem, ear pain and trouble swallowing.    Eyes: Negative.    Respiratory: Negative for cough, shortness of breath and wheezing.    Cardiovascular: Negative for chest pain and leg swelling.   Gastrointestinal: Positive for diarrhea. Negative for abdominal distention, constipation and vomiting.   Endocrine: Negative for polydipsia and polyphagia.   Genitourinary: Negative for dysuria and flank pain.   Musculoskeletal: Positive for back pain. Negative for arthralgias, myalgias, neck pain and neck stiffness.   Skin: Negative for rash.   Allergic/Immunologic: Negative.    Neurological: Negative for syncope, weakness and headaches.   Psychiatric/Behavioral: Negative for agitation and behavioral problems.   All other systems reviewed and are negative.    Objective:     Vital Signs (Most Recent):  Temp: 98.4 °F (36.9 °C) (02/18/17 1202)  Pulse: 78 (02/18/17 1202)  Resp: 18 (02/18/17 0749)  BP: (!) 145/65 (02/18/17 1202)  SpO2: (!) 94 % (02/18/17 1202) Vital Signs (24h Range):  Temp:  [97.7 °F (36.5 °C)-99.5 °F (37.5 °C)] 98.4 °F (36.9 °C)  Pulse:  [67-87] 78  Resp:  [16-20] 18  SpO2:  [94 %-95 %] 94 %  BP: (113-155)/(65-88) 145/65     Weight: 81.6 kg (180 lb)  Body mass index is 32.92 kg/(m^2).    Intake/Output Summary (Last 24 hours) at 02/18/17 1435  Last data filed at 02/18/17 0500   Gross per 24 hour   Intake              720 ml   Output             1260 ml   Net             -540 ml      Physical Exam    Significant Labs:   Recent Lab Results     None          Significant Imaging: I have reviewed and interpreted all pertinent imaging results/findings within the past 24 hours.

## 2017-02-18 NOTE — PROGRESS NOTES
[facility] Hospital Medicine  Progress Note    Patient Name: Kat Corley  MRN: [id number]  Patient Class: IP- Inpatient   Admission Date: 2/13/2017  Length of Stay: 5 days  Attending Physician: Jere Gracia MD  Primary Care Provider: NAMITA Guerin        Subjective:     Principal Problem:Vertebral compression fracture    HPI:  The patient is a 46 y.o. female with unknown cancer etiology who presents to the ED with an acute onset of mid back pain after falling down the stairs yesterday, bilateral LE swelling, and SOB. She had radiation therapy earlier this morning, whom the doctors said she should go to the ER to get it checked out. The patient takes Morphine and Oxycodone for her chronic pain. She also experienced 2 episodes of vomiting last night. The patient denies history of LE blood clot, chest pain, or any other symptoms at this time. No pertinent SHx noted. No known drug allergies noted    Hospital Course:  Had fall at home directly after discharge last week.  Now with severe back pain and pathologic fractures at T6 and T10  Yet to start chemo.  Kenan henry inyesterday  Spoke with Dr Yeh from [facility] onc at [facility], chemo scheduled for Monday    Pain, nausea worse again today despite escalation in analgesics  Not eating  miserable but not willing to consider hospice      Interval History:     Review of Systems   Constitutional: Positive for activity change, appetite change and fatigue. Negative for chills, fever and unexpected weight change.   HENT: Negative for dental problem, ear pain and trouble swallowing.    Eyes: Negative.    Respiratory: Negative for cough, shortness of breath and wheezing.    Cardiovascular: Negative for chest pain and leg swelling.   Gastrointestinal: Positive for diarrhea. Negative for abdominal distention, constipation and vomiting.   Endocrine: Negative for polydipsia and polyphagia.   Genitourinary: Negative for dysuria and flank pain.    Musculoskeletal: Positive for back pain. Negative for arthralgias, myalgias, neck pain and neck stiffness.   Skin: Negative for rash.   Allergic/Immunologic: Negative.    Neurological: Negative for syncope, weakness and headaches.   Psychiatric/Behavioral: Negative for agitation and behavioral problems.   All other systems reviewed and are negative.    Objective:     Vital Signs (Most Recent):  Temp: 98.4 °F (36.9 °C) (02/18/17 1202)  Pulse: 78 (02/18/17 1202)  Resp: 18 (02/18/17 0749)  BP: (!) 145/65 (02/18/17 1202)  SpO2: (!) 94 % (02/18/17 1202) Vital Signs (24h Range):  Temp:  [97.7 °F (36.5 °C)-99.5 °F (37.5 °C)] 98.4 °F (36.9 °C)  Pulse:  [67-87] 78  Resp:  [16-20] 18  SpO2:  [94 %-95 %] 94 %  BP: (113-155)/(65-88) 145/65     Weight: 81.6 kg (180 lb)  Body mass index is 32.92 kg/(m^2).    Intake/Output Summary (Last 24 hours) at 02/18/17 1435  Last data filed at 02/18/17 0500   Gross per 24 hour   Intake              720 ml   Output             1260 ml   Net             -540 ml      Physical Exam    Significant Labs:   Recent Lab Results     None          Significant Imaging: I have reviewed and interpreted all pertinent imaging results/findings within the past 24 hours.    Assessment/Plan:      * Vertebral compression fracture  Pathologic at T6 and T10.  TLSO brace ordered.  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  Does not want hospice.    To start chemo Monday.  Too sick to go home today      Acute pulmonary embolism  Small in size.  On apixaban    VTE Risk Mitigation         Ordered     Medium Risk of VTE  Once      02/13/17 1445     Place KAREN hose  Until discontinued      02/13/17 1445     Place sequential compression device  Until discontinued      02/13/17 1445          Aston Amaya PA-C  Department of Hospital Medicine   [facility]

## 2017-02-18 NOTE — PLAN OF CARE
VSS. Afebrile. Able to make wants and needs known to staff.  Voiding without difficulty. Tolerating PO without difficulty.  Port dressing clean, dry, and intact. Free from falls, injury, and trauma during this shift. Purposeful hourly rounding completed.

## 2017-02-18 NOTE — PLAN OF CARE
Problem: Patient Care Overview  Goal: Plan of Care Review  Outcome: Ongoing (interventions implemented as appropriate)  Patient in no apparent distress. Sat's  94 % on room air . Will continue to monitor.

## 2017-02-18 NOTE — PLAN OF CARE
Problem: Patient Care Overview  Goal: Plan of Care Review  Outcome: Ongoing (interventions implemented as appropriate)  Remains free from fall, injury, and skin breakdown. Ambulates independently to bathroom. VSS stable on RA and afebrile. Positions self independently.Pain mildly controlled with PO/IV PRN meds. Denied N&V/SOB. Tolerating ordered diet. TLSO brace PRN. IV site WNL. Plan of care reviewed with patient and all questions answered. Bed low, locked w/ bed alarm on. Call light within reach. Purposeful rounding performed. No other complaints at this time.

## 2017-02-18 NOTE — PLAN OF CARE
ATTN: TEAM   DC PLANNING     NO NEEDS @ THIS TIME - INDEPENDENT LIVES WITH ADULT CHILDREN     READMIT  WITH IN 30 DAYS   DME OWNS :  HOME O2   PHARMACY - [facility]     RECOMMENDATION PT /[place] (IN HOUSE )     Consult [facility] for TLSO brace -   PER MD TEAM - DONE @ BS PER PRIMARY RN     Matilde Choi, TONY  Case management 2/14/20171:11 PM  # [phone] (FAX) [phone]     02/18/17 1327   Discharge Assessment   Assessment Type Discharge Planning Reassessment

## 2017-02-18 NOTE — PLAN OF CARE
Problem: Patient Care Overview  Goal: Plan of Care Review  Outcome: Ongoing (interventions implemented as appropriate)  Remains free from fall, injury, and skin breakdown. Ambulates independently to bathroom. VSS stable on RA and afebrile. Positions self independently.Pain mildly controlled with PO/IV PRN meds. Tolerating ordered diet. TLSO brace PRN. IV site WNL. Plan of care reviewed with patient and all questions answered. Bed low, locked w/ bed alarm on. Call light within reach. Purposeful rounding performed. No other complaints at this time.

## 2017-02-19 NOTE — ASSESSMENT & PLAN NOTE
Widespread malignancy of unknown primary.  Grave prognosis.  Does not want hospice.    To start chemo Monday.

## 2017-02-19 NOTE — DISCHARGE SUMMARY
[facility] Hospital Medicine  Discharge Summary      Patient Name: Kat Corley  MRN: [id number]  Admission Date: 2/13/2017  Hospital Length of Stay: 6 days  Discharge Date and Time:  02/19/2017 9:42 AM  Attending Physician: Jere Gracia MD   Discharging Provider: Aston Amaya PA-C  Primary Care Provider: NAMITA Guerin      HPI:   The patient is a 46 y.o. female with unknown cancer etiology who presents to the ED with an acute onset of mid back pain after falling down the stairs yesterday, bilateral LE swelling, and SOB. She had radiation therapy earlier this morning, whom the doctors said she should go to the ER to get it checked out. The patient takes Morphine and Oxycodone for her chronic pain. She also experienced 2 episodes of vomiting last night. The patient denies history of LE blood clot, chest pain, or any other symptoms at this time. No pertinent SHx noted. No known drug allergies noted    Procedure(s) (LRB):  [id number] (N/A)      Indwelling Lines/Drains at time of discharge:   Lines/Drains/Airways     Central Venous Catheter Line                 Port A Cath Single Lumen 02/15/17 1400 other (see comments) 3 days              Hospital Course:   Had fall at home directly after discharge last week.  Now with severe back pain and pathologic fractures at T6 and T10  Yet to start chemo.  Port put in right arm  Spoke with Dr Yeh from [facility] onc at [facility], chemo scheduled for Monday    Pain, nausea much better today  Appetite improved    Ok to go  To start chemo monday       Consults:   Consults         Status Ordering Provider     Inpatient consult to Hematology  Once     Provider:  Barry Stratton MD    Completed MARC GAN     Inpatient consult to Interventional Radiology  Once     Provider:  (Not yet assigned)    Completed ASTON AMAYA     Inpatient consult to Orthopedic Surgery  Once     Provider:  Kieran Allan MD    Acknowledged  MARC GAN     Inpatient consult to Social Work  Once     Provider:  (Not yet assigned)    Completed SAMI ARITA          Significant Diagnostic Studies: Labs: CMP No results for input(s): NA, K, CL, CO2, GLU, BUN, CREATININE, CALCIUM, PROT, ALBUMIN, BILITOT, ALKPHOS, AST, ALT, ANIONGAP, ESTGFRAFRICA, EGFRNONAA in the last 48 hours. and CBC No results for input(s): WBC, HGB, HCT, PLT in the last 48 hours.    Pending Diagnostic Studies:     None        Final Active Diagnoses:    Diagnosis Date Noted POA    PRINCIPAL PROBLEM:  Vertebral compression fracture [M48.50XA] 02/13/2017 Yes    Acute pulmonary embolism [I26.99] 02/15/2017 Yes    Anemia [D64.9] 02/15/2017 Yes    Cancer [C80.1] 02/02/2017 Yes    COPD (chronic obstructive pulmonary disease) [J44.9] 01/10/2017 Yes      Problems Resolved During this Admission:    Diagnosis Date Noted Date Resolved POA      * Vertebral compression fracture  Pathologic at T6 and T10.  TLSO brace ordered.  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  Does not want hospice.    To start chemo Monday.     Acute pulmonary embolism  Small in size.  On apixaban      Discharged Condition: fair    Disposition: Home or Self Care    Follow Up:  Follow-up Information     Follow up with Sylvester Yeh MD In 1 day.    Specialty:  Oncology    Contact information:    [address] [place] [address]  [phone]          Patient Instructions:   No discharge procedures on file.  Medications:  Reconciled Home Medications:   Current Discharge Medication List      START taking these medications    Details   apixaban 5 mg Tab Take 1 tablet (5 mg total) by mouth 2 (two) times daily.  Qty: 60 tablet, Refills: 0      clotrimazole (MYCELEX) 10 mg tanja Take 1 tablet (10 mg total) by mouth 5 (five) times daily.  Qty: 35 tablet, Refills: 0         CONTINUE these  medications which have CHANGED    Details   morphine (MS CONTIN) 30 MG 12 hr tablet Take 2 tablets (60 mg total) by mouth every 12 (twelve) hours.  Qty: 60 tablet, Refills: 0      oxycodone (ROXICODONE) 10 mg Tab immediate release tablet Take 1 tablet (10 mg total) by mouth every 4 (four) hours as needed for Pain.  Qty: 60 tablet, Refills: 0    Associated Diagnoses: Primary cancer of unknown site; Pain, neoplasm-related         CONTINUE these medications which have NOT CHANGED    Details   cyanocobalamin (VITAMIN B-12) 100 MCG tablet Take 100 mcg by mouth once daily.      dexamethasone (DECADRON) 4 MG Tab Take 2 tablets (8 mg total) by mouth every 12 (twelve) hours.  Qty: 40 tablet, Refills: 0      fentaNYL (DURAGESIC) 75 mcg/hr Place 1 patch onto the skin every 72 hours.  Qty: 5 patch, Refills: 0      gabapentin (NEURONTIN) 300 MG capsule Take 300 mg by mouth 3 (three) times daily.      multivitamin (ONE DAILY MULTIVITAMIN) per tablet Take 1 tablet by mouth once daily.      nicotine (NICODERM CQ) 14 mg/24 hr Place 1 patch onto the skin once daily.  Refills: 0      ondansetron (ZOFRAN-ODT) 4 MG TbDL Take 1 tablet (4 mg total) by mouth every 8 (eight) hours as needed.  Qty: 30 tablet, Refills: 0      polyethylene glycol (GLYCOLAX) 17 gram PwPk Take 17 g by mouth once daily.  Qty: 30 each, Refills: 2      pyridoxine, vitamin B6, (VITAMIN B-6) 100 MG Tab Take 100 mg by mouth once daily.      senna-docusate 8.6-50 mg (PERICOLACE) 8.6-50 mg per tablet Take 1 tablet by mouth once daily.           Time spent on the discharge of patient: 60 minutes    Aston Amaya PA-C  Department of Hospital Medicine  [facility]

## 2017-02-19 NOTE — DISCHARGE SUMMARY
[facility] Hospital Medicine  Discharge Summary      Patient Name: Kat Corley  MRN: [id number]  Admission Date: 2/13/2017  Hospital Length of Stay: 6 days  Discharge Date and Time:  02/19/2017 9:40 AM  Attending Physician: Jere Gracia MD   Discharging Provider: Aston Amaya PA-C  Primary Care Provider: NAMITA Guerin      HPI:   The patient is a 46 y.o. female with unknown cancer etiology who presents to the ED with an acute onset of mid back pain after falling down the stairs yesterday, bilateral LE swelling, and SOB. She had radiation therapy earlier this morning, whom the doctors said she should go to the ER to get it checked out. The patient takes Morphine and Oxycodone for her chronic pain. She also experienced 2 episodes of vomiting last night. The patient denies history of LE blood clot, chest pain, or any other symptoms at this time. No pertinent SHx noted. No known drug allergies noted    Procedure(s) (LRB):  [id number] (N/A)      Indwelling Lines/Drains at time of discharge:   Lines/Drains/Airways     Central Venous Catheter Line                 Port A Cath Single Lumen 02/15/17 1400 other (see comments) 3 days              Hospital Course:   Had fall at home directly after discharge last week.  Now with severe back pain and pathologic fractures at T6 and T10  Yet to start chemo.  Port put in right arm  Spoke with Dr Yeh from [facility] onc at [facility], chemo scheduled for Monday    Pain, nausea much better today  Appetite improved    Ok to go  To start chemo monday       Consults:   Consults         Status Ordering Provider     Inpatient consult to Hematology  Once     Provider:  Barry Stratton MD    Completed MARC GAN     Inpatient consult to Interventional Radiology  Once     Provider:  (Not yet assigned)    Completed ASTON AMAYA     Inpatient consult to Orthopedic Surgery  Once     Provider:  Kieran Allan MD    Acknowledged  MARC GAN     Inpatient consult to Social Work  Once     Provider:  (Not yet assigned)    SAMI Gutierrez          Significant Diagnostic Studies: Labs: BMP: No results for input(s): GLU, NA, K, CL, CO2, BUN, CREATININE, CALCIUM, MG in the last 48 hours., CMP No results for input(s): NA, K, CL, CO2, GLU, BUN, CREATININE, CALCIUM, PROT, ALBUMIN, BILITOT, ALKPHOS, AST, ALT, ANIONGAP, ESTGFRAFRICA, EGFRNONAA in the last 48 hours. and CBC No results for input(s): WBC, HGB, HCT, PLT in the last 48 hours.    Pending Diagnostic Studies:     None        Final Active Diagnoses:    Diagnosis Date Noted POA    PRINCIPAL PROBLEM:  Vertebral compression fracture [M48.50XA] 02/13/2017 Yes    Acute pulmonary embolism [I26.99] 02/15/2017 Yes    Anemia [D64.9] 02/15/2017 Yes    Cancer [C80.1] 02/02/2017 Yes    COPD (chronic obstructive pulmonary disease) [J44.9] 01/10/2017 Yes      Problems Resolved During this Admission:    Diagnosis Date Noted Date Resolved POA      No new Assessment & Plan notes have been filed under this hospital service since the last note was generated.  Service: Hospital Medicine      Discharged Condition: fair    Disposition: Home or Self Care    Follow Up:  Follow-up Information     Follow up with Sylvester Yeh MD In 1 day.    Specialty:  Oncology    Contact information:    [address] [place] [id number]  [phone]          Patient Instructions:   No discharge procedures on file.  Medications:  Reconciled Home Medications:   Current Discharge Medication List      START taking these medications    Details   apixaban 5 mg Tab Take 1 tablet (5 mg total) by mouth 2 (two) times daily.  Qty: 60 tablet, Refills: 0      clotrimazole (MYCELEX) 10 mg tanja Take 1 tablet (10 mg total) by mouth 5 (five) times daily.  Qty: 35 tablet, Refills: 0         CONTINUE these medications which have CHANGED    Details   morphine (MS CONTIN) 30 MG 12 hr tablet Take 2 tablets (60 mg  total) by mouth every 12 (twelve) hours.  Qty: 60 tablet, Refills: 0      oxycodone (ROXICODONE) 10 mg Tab immediate release tablet Take 1 tablet (10 mg total) by mouth every 4 (four) hours as needed for Pain.  Qty: 60 tablet, Refills: 0    Associated Diagnoses: Primary cancer of unknown site; Pain, neoplasm-related         CONTINUE these medications which have NOT CHANGED    Details   cyanocobalamin (VITAMIN B-12) 100 MCG tablet Take 100 mcg by mouth once daily.      dexamethasone (DECADRON) 4 MG Tab Take 2 tablets (8 mg total) by mouth every 12 (twelve) hours.  Qty: 40 tablet, Refills: 0      fentaNYL (DURAGESIC) 75 mcg/hr Place 1 patch onto the skin every 72 hours.  Qty: 5 patch, Refills: 0      gabapentin (NEURONTIN) 300 MG capsule Take 300 mg by mouth 3 (three) times daily.      multivitamin (ONE DAILY MULTIVITAMIN) per tablet Take 1 tablet by mouth once daily.      nicotine (NICODERM CQ) 14 mg/24 hr Place 1 patch onto the skin once daily.  Refills: 0      ondansetron (ZOFRAN-ODT) 4 MG TbDL Take 1 tablet (4 mg total) by mouth every 8 (eight) hours as needed.  Qty: 30 tablet, Refills: 0      polyethylene glycol (GLYCOLAX) 17 gram PwPk Take 17 g by mouth once daily.  Qty: 30 each, Refills: 2      pyridoxine, vitamin B6, (VITAMIN B-6) 100 MG Tab Take 100 mg by mouth once daily.      senna-docusate 8.6-50 mg (PERICOLACE) 8.6-50 mg per tablet Take 1 tablet by mouth once daily.           Time spent on the discharge of patient: 50 minutes    Aston Amaya PA-C  Department of Hospital Medicine  [facility]

## 2017-02-19 NOTE — PLAN OF CARE
Problem: Patient Care Overview  Goal: Plan of Care Review  Outcome: Ongoing (interventions implemented as appropriate)  Patient remains free from injury or falls. Vital signs stable throughout night on room air. Positions self independently and ambulates in room. TLSO brace at bedside but pt does not wear it. Pain mildly managed with IV and PO medications consistently throughout shift. Bed in low locked position and call light within reach. Will continue to monitor.

## 2017-02-19 NOTE — PLAN OF CARE
Problem: Patient Care Overview  Goal: Plan of Care Review  Outcome: Outcome(s) achieved Date Met:  02/19/17  Remains free from fall, injury, and skin breakdown. Ambulates independently to bathroom. VSS stable on RA and afebrile. Positions self independently.Pain mildly controlled with PO/IV PRN meds. Denied N&V/SOB. Tolerating ordered diet. TLSO brace PRN. IV site WNL. Plan of care reviewed with patient and all questions answered. Bed low, locked w/ bed alarm on. Call light within reach. Purposeful rounding performed. No other complaints at this time.    EARLENE Clancy rounded Pt this morning.     Eager & in agreement w/ DC. VU of DC instructions--paperwork & prescriptions passed & explained. Power port flushed; needle removed. DC power port manual instructions and bracelet  given.

## 2017-02-22 NOTE — ED AVS SNAPSHOT
[facility] [address] [place] [address]               Kat Corley   2017  6:40 PM   ED    Description:  Female : 1970   Department:  [facility]           Your Care was Coordinated By:     Provider Role From To    Artur Vora MD Attending Provider [phone] --      Reason for Visit     droopy R eyelid           Diagnoses this Visit        Comments    Ptosis of eyelid, right    -  Primary       ED Disposition     None           To Do List           Follow-up Information     Schedule an appointment as soon as possible for a visit with NAMITA Guerin.    Specialty:  Family Medicine    Contact information:    [address] [place] [address]  [phone]          Follow up with [facility].    Specialty:  Emergency Medicine    Why:  If symptoms worsen    Contact information:    [address] [place] [id number] [phone]      [facility]     [facility] Care [facility] -  Assistance  Registered nurses in the [facility] provide clinical advisement, health education, appointment booking, and other advisory services.  Call for this free service at [phone].             Medications           Message regarding Medications     Verify the changes and/or additions to your medication regime listed below are the same as discussed with your clinician today.  If any of these changes or additions are incorrect, please notify your healthcare provider.             Verify that the below list of medications is an accurate representation of the medications you are currently taking.  If none reported, the list may be blank. If incorrect, please contact your healthcare provider. Carry this list with you in case of emergency.           Current Medications     apixaban 5 mg Tab Take 1 tablet (5 mg total) by mouth 2 (two) times daily.    clotrimazole (MYCELEX) 10 mg  "tanja Take 1 tablet (10 mg total) by mouth 5 (five) times daily.    cyanocobalamin (VITAMIN B-12) 100 MCG tablet Take 100 mcg by mouth once daily.    fentaNYL (DURAGESIC) 75 mcg/hr Place 1 patch onto the skin every 72 hours.    gabapentin (NEURONTIN) 300 MG capsule Take 300 mg by mouth 3 (three) times daily.    morphine (MS CONTIN) 30 MG 12 hr tablet Take 2 tablets (60 mg total) by mouth every 12 (twelve) hours.    multivitamin (ONE DAILY MULTIVITAMIN) per tablet Take 1 tablet by mouth once daily.    nicotine (NICODERM CQ) 14 mg/24 hr Place 1 patch onto the skin once daily.    ondansetron (ZOFRAN-ODT) 4 MG TbDL Take 1 tablet (4 mg total) by mouth every 8 (eight) hours as needed.    oxycodone (ROXICODONE) 10 mg Tab immediate release tablet Take 1 tablet (10 mg total) by mouth every 4 (four) hours as needed for Pain.    polyethylene glycol (GLYCOLAX) 17 gram PwPk Take 17 g by mouth once daily.    pyridoxine, vitamin B6, (VITAMIN B-6) 100 MG Tab Take 100 mg by mouth once daily.    senna-docusate 8.6-50 mg (PERICOLACE) 8.6-50 mg per tablet Take 1 tablet by mouth once daily.           Clinical Reference Information           Your Vitals Were     BP Pulse Temp Resp Height Weight    130/83 (BP Location: Left arm, Patient Position: Sitting) 115 98.3 °F (36.8 °C) (Oral) 16 5' 2" (1.575 m) 81.6 kg (180 lb)    Last Period SpO2 BMI          01/23/2017 93% 32.92 kg/m2        Allergies as of 2/22/2017     No Known Allergies      Immunizations Administered on Date of Encounter - 2/22/2017     None      ED Micro, Lab, POCT     Start Ordered       Status Ordering Provider    02/22/17 1945 02/22/17 1944  Paraneoplastic Autoantibody Evaulation, Serum  Once      In process     02/22/17 1945 02/22/17 1944  Myasthenia Gravis Eval With Musk Reflex  Once      In process     02/22/17 1945 02/22/17 1945  T4, free  Once      In process     02/22/17 1944 02/22/17 1944  Hemoglobin A1c  Once      In process     02/22/17 1944 02/22/17 1944  " Acetylcholine receptor, binding  Once      In process     02/22/17 1943 02/22/17 1944  TSH  STAT      Final result     02/22/17 1930 02/22/17 1944  Comprehensive Metabolic Panel (CMP)  STAT      Final result     02/22/17 1930 02/22/17 1944  Complete Blood Count (CBC)  STAT      Final result       ED Imaging Orders     None        Discharge Instructions         Ptosis (Blepharoptosis)  Ptosis is when the upper eyelid droops down more than it should. If the ptosis is severe, it may block vision. The main muscle that opens the eyelid is called the levator palpebralis. Another muscle that helps the eye open is called the superior tarsal. If there is a problem with either of these muscles or their nerves, it can cause ptosis. Ptosis is not very common.  What causes ptosis?  A child may be born with ptosis. This is most often because of poor growth of the levator palpebralis muscle. Ptosis may also be caused by problems with the tendon attached to the superior tarsal muscle. It may be caused by problems with nerves, such as the third cranial nerve. Or it may be caused by a rare disorder. Some kinds of ptosis may run in families. If another family member has ptosis, you or your child may be more likely to have it.  Diagnosing ptosis  Diagnosis starts with a medical history and physical exam by an eye doctor (ophthalmologist). This will include a complete eye exam. Your child may have tests to help diagnose the cause. These may include imaging tests such as:  · CT scan  · MRI  · Magnetic resonance angiography (MRA)  Treatment for ptosis  If your child has ptosis at birth, the eye doctor may choose to watch your child for vision problems. In some cases, ptosis gets better over time. If the ptosis doesnt get better, your child may need surgery.  If your childs ptosis is severe, your doctor may advise surgery right away. This is more likely if the eyelid is blocking your childs vision. Severe ptosis may delay visual  development in a child.  Your child may also need treatment for the other symptoms that can occur with ptosis. Your child may need to wear glasses to correct a common eyesight problem called astigmatism. If your child develops lazy eye (amblyopia), he or she may need to wear an eye patch over the normal eye.  Follow-up care  Work with your childs doctor to make sure the ptosis does not affect your childs vision over time.  Date Last Reviewed: 2/6/2015  © 2758-8607 [employer]. [address] [place] [address], [place] [address]. All rights reserved. This information is not intended as a substitute for professional medical care. Always follow your healthcare professional's instructions.          Your Scheduled Appointments     Mar 10, 2017  8:30 AM CST   Infusion 300 Min with [facility], CHEMO   [facility]    [address] [place] [id number] [phone]              [facility] Sign-Up     Activating your [facility] account is as easy as 1-2-3!     1) Visit [email or web address], select Sign Up Now, enter this activation code and your date of birth, then select Next.  [email or web address]  Expires: 2/27/2017  1:08 PM      2) Create a username and password to use when you visit [facility] in the future and select a security question in case you lose your password and select Next.    3) Enter your e-mail address and click Sign Up!    Additional Information  If you have questions, please e-mail [email or web address] or call [phone] to talk to our [facility] staff. Remember, [facility] is NOT to be used for urgent needs. For medical emergencies, dial 911.         Smoking Cessation     If you would like to quit smoking:   You may be eligible for free services if you are a [place] resident and started smoking cigarettes before September 1, 1988.  Call the Smoking Cessation Trust (SCT) toll free at (251) 013-6943 or (833) 357-6222.   Call 9-800-QUIT-NOW if you do not meet  the above criteria.             [facility] complies with applicable Federal civil rights laws and does not discriminate on the basis of race, color, national origin, age, disability, or sex.        Language Assistance Services     ATTENTION: Language assistance services are available, free of charge. Please call [phone].      ATENCIÓN: Si habla español, tiene a bryant disposición servicios gratuitos de asistencia lingüística. Llame al [phone].     CHÚ Ý: N?u b?n nói Ti?ng Vi?t, có các d?ch v? h? tr? ngôn ng? mi?n phí dành cho b?n. G?i s? [phone].

## 2017-02-22 NOTE — TELEPHONE ENCOUNTER
Patient missed Medical Oncology clinic visit on 2/20 with Dr. Rasmussen and I to start chemotherapy. Called back patient again at the number listed in demographics to schedule follow up and start treatment, however, no answer. Left message for call back.     Sylvester Yeh MD  Hematology/Oncology Fellow

## 2017-02-23 NOTE — ED PROVIDER NOTES
Encounter Date: 2017    SCRIBE #1 NOTE: I, Nikki Lizarraga, am scribing for, and in the presence of,  Dr. Vora. I have scribed the entire note.       History     Chief Complaint   Patient presents with    droopy R eyelid     since Monday.     Review of patient's allergies indicates:  No Known Allergies  HPI Comments: 2017  7:25 PM     Chief Complaint: droopy eye lid      The patient is a 46 y.o. female who is presenting with acute onset of droopy right eye lid which began 3 days ago. She has applied hot compresses to the area, however it has not alleviated her sx's. She states she recently was diagnosed with cancer and is scheduled to begin chemotherapy, although she is not sure which type of cancer she has. She reports she does not have any visual disturbance and has no other complaints or associated sx's. She reports she has normal strength in her arms and legs, and has been able to eat and drink appropriately. She was [facility] the [facility]. There are no other complaints at this time. She has a past medical history of Cancer and Neuropathy.  has a past surgical history that includes Tonsillectomy;  section; and Tubal ligation.           The history is provided by the patient.     Past Medical History   Diagnosis Date    Cancer     Neuropathy      No past medical history pertinent negatives.  Past Surgical History   Procedure Laterality Date    Tonsillectomy       section      Tubal ligation       History reviewed. No pertinent family history.  Social History   Substance Use Topics    Smoking status: Current Every Day Smoker     Packs/day: 0.50     Years: 15.00     Types: Cigarettes    Smokeless tobacco: None    Alcohol use No     Review of Systems   Constitutional: Negative for fever.   HENT: Negative for sore throat.    Eyes: Negative for visual disturbance.        Eyelid droop.    Respiratory: Negative for shortness of breath.     Cardiovascular: Negative for chest pain.   Gastrointestinal: Negative for nausea.   Genitourinary: Negative for dysuria.   Musculoskeletal: Negative for back pain.   Skin: Negative for rash.   Neurological: Negative for weakness.   Hematological: Does not bruise/bleed easily.   Psychiatric/Behavioral: Negative for confusion.       Physical Exam   Initial Vitals   BP Pulse Resp Temp SpO2   02/22/17 1705 02/22/17 1705 02/22/17 1705 02/22/17 1705 02/22/17 1705   130/83 115 16 98.3 °F (36.8 °C) 93 %     Physical Exam    Nursing note and vitals reviewed.  Constitutional: She appears well-developed and well-nourished. No distress.   HENT:   Head: Normocephalic and atraumatic.   Eyes: Conjunctivae are normal.   Right eye is down and out. Miosis in the right pupil.    Cardiovascular: Normal rate, regular rhythm, normal heart sounds and intact distal pulses. Exam reveals no gallop and no friction rub.    No murmur heard.  Pulmonary/Chest: Breath sounds normal. No respiratory distress. She has no wheezes. She has no rhonchi. She has no rales. She exhibits no tenderness.   Abdominal: Soft. Bowel sounds are normal. She exhibits no distension and no mass. There is no tenderness. There is no rebound and no guarding.   Neurological: She is alert and oriented to person, place, and time. She has normal strength. No sensory deficit.   Drooping right eye lid with forehead sparing, and no right facial droop.      Skin: Skin is warm.   Psychiatric: She has a normal mood and affect.         ED Course   Procedures  Labs Reviewed - No data to display          Medical Decision Making:   Initial Assessment:   Patient was interviewed and examined and does appear be progressing with evidence of new drooping eyelid.  She does have a significant underlying history of metastatic cancer of unknown origin.  I did speak with the on-call neurologist, Dr. Sergio casey, who indicated that multiple potential causes could underlie this disease process.   These include myasthenia gravis, thyroid disorder, Jodi syndrome, diabetes mellitus, Lambert-Eaton or neoplastic syndrome.  He did recommend the patient have multiple send out antibody tests obtained.  These test available on formulary were ordered.  Additional screening blood work was obtained.  Differential Diagnosis:   ,  ED Management:  Blood work today indicates findings consistent with pancytopenia.  I did review her past CTA within the past 2 weeks is indicates multiple areas of significant metastatic disease.  No focal apical tumor noted.  Patient is mildly hypokalemic at a value of 3.1.  At this time patient is requested to follow up with a neurologist she is in contact with within the week to assess for resolution and progression and also to follow-up the antibody test.  She additionally asked to return the emergency Department for any new, concerning, or worsening symptoms.    Patient is progressing with isolated cranial nerve palsies and is not indicating findings consistent with a Bell's palsy or acute CVA.            Scribe Attestation:   Scribe #1: I performed the above scribed service and the documentation accurately describes the services I performed. I attest to the accuracy of the note.    Attending Attestation:           Physician Attestation for Scribe:  Physician Attestation Statement for Scribe #1: I, Dr. Vora, reviewed documentation, as scribed by Nikki Delarosa in my presence, and it is both accurate and complete.                 ED Course     Clinical Impression:   The encounter diagnosis was Ptosis of eyelid, right.         Disposition:   Disposition: Discharged  Condition: Stable       Artur Vora MD  02/23/17 8541

## 2017-02-23 NOTE — TELEPHONE ENCOUNTER
Forwarded to .  Will let Dr. Yeh know that the patient is calling back to reschedule and I ask that she be put on anyone's schedule with him as long as chemo is involved also.

## 2017-02-23 NOTE — DISCHARGE INSTRUCTIONS
Ptosis (Blepharoptosis)  Ptosis is when the upper eyelid droops down more than it should. If the ptosis is severe, it may block vision. The main muscle that opens the eyelid is called the levator palpebralis. Another muscle that helps the eye open is called the superior tarsal. If there is a problem with either of these muscles or their nerves, it can cause ptosis. Ptosis is not very common.  What causes ptosis?  A child may be born with ptosis. This is most often because of poor growth of the levator palpebralis muscle. Ptosis may also be caused by problems with the tendon attached to the superior tarsal muscle. It may be caused by problems with nerves, such as the third cranial nerve. Or it may be caused by a rare disorder. Some kinds of ptosis may run in families. If another family member has ptosis, you or your child may be more likely to have it.  Diagnosing ptosis  Diagnosis starts with a medical history and physical exam by an eye doctor (ophthalmologist). This will include a complete eye exam. Your child may have tests to help diagnose the cause. These may include imaging tests such as:  · CT scan  · MRI  · Magnetic resonance angiography (MRA)  Treatment for ptosis  If your child has ptosis at birth, the eye doctor may choose to watch your child for vision problems. In some cases, ptosis gets better over time. If the ptosis doesnt get better, your child may need surgery.  If your childs ptosis is severe, your doctor may advise surgery right away. This is more likely if the eyelid is blocking your childs vision. Severe ptosis may delay visual development in a child.  Your child may also need treatment for the other symptoms that can occur with ptosis. Your child may need to wear glasses to correct a common eyesight problem called astigmatism. If your child develops lazy eye (amblyopia), he or she may need to wear an eye patch over the normal eye.  Follow-up care  Work with your childs doctor to make  sure the ptosis does not affect your childs vision over time.  Date Last Reviewed: 2/6/2015  © 5636-8778 [employer]. [address] [place] [address], [place] [address]. All rights reserved. This information is not intended as a substitute for professional medical care. Always follow your healthcare professional's instructions.

## 2017-02-23 NOTE — TELEPHONE ENCOUNTER
----- Message from Dimple Montague sent at 2/23/2017  2:56 PM CST -----  Contact: Pt  Pt is returning call to r/s appt.   Pt can be reached at [phone].  Thank you

## 2017-02-24 NOTE — PLAN OF CARE
Problem: Patient Care Overview  Goal: Plan of Care Review  Outcome: Ongoing (interventions implemented as appropriate)  Pt free of trauma, falls, and injury. Pt VSS and afebrile throughout shift. Pt free of skin breakdown. Pt pain has been moderately controlled by IV pain meds and tolerated well. Pt has been voiding adequately throughout shift. Port dressing changed. Purposeful rounding done. Pt has call light in reach, bed brakes on, side rails up x2, bed in low position, pt refused SCDs,/TEDs,and nonskid socks on. Pt lying in bed in no distress. Will continue to monitor.           - - -

## 2017-02-24 NOTE — TELEPHONE ENCOUNTER
----- Message from Sylvester Yeh MD sent at 2/24/2017  8:23 AM CST -----  Good Morning Garrison,    Can you please schedule Ms. Corley for lab work (CBC, CMP), clinic visit with me and Dr. Rasmussen at 9 am on Monday 2/27 and also for chemotherapy (carbo/taxol). Thank you,    Sylvester

## 2017-02-25 NOTE — ED NOTES
"Reports that phlebotomist told her that her new port-a-cath was "infected".  Area noted to be well healed without redness, swelling, or drainage.  No complaints of pain.  ROS WDL.    "

## 2017-02-25 NOTE — DISCHARGE INSTRUCTIONS
Wound Check, No Infection  Your wound is healing as expected. There are no signs of infection.   Home care  Continue to care for your wound as directed.  · Cover your wound with a bandage unless your healthcare provider tells you not to.  · Gently clean your wound with soap and water when you shower.   · Unless told otherwise, avoid swimming and taking tub baths until your wound has healed.  Follow-up care  Follow up with your healthcare provider as advised.  · If you have sutures or staples, return as directed to have them removed. If they are not taken out on time, they may be harder to remove and scarring may be worse. Infection may develop.  · If surgical tape strips were used, you can remove them yourself if they have not fallen off by 10 days after they were applied.   When to seek medical advice  Call your healthcare provider right away if any of these occur:  · Fever of 100.4ºF (38ºC) or higher, or as directed by your health care provider  · Symptoms of a wound infection, including:  ¨ Redness or swelling around the wound  ¨ Warmth coming from the wound  ¨ New or worsening pain  ¨ Red streaks around the wound  ¨ Draining pus  Date Last Reviewed: 8/24/2015  © 0328-0317 [employer]. [address] [place] [address], [place] [address]. All rights reserved. This information is not intended as a substitute for professional medical care. Always follow your healthcare professional's instructions.

## 2017-02-25 NOTE — ED NOTES
Affected area cleaned with chlorhexadine, areas of adhesive residue removed with adhesive remover pads.  Telfa applied and held in place with coban. No other complaints.  Scheduled for outpatient CT this morning.

## 2017-02-25 NOTE — ED PROVIDER NOTES
Encounter Date: 2017       History     Chief Complaint   Patient presents with    IV access problem     Review of patient's allergies indicates:  No Known Allergies  HPI Comments: 46-year-old female presents requesting evaluation of her port on her right upper arm.  Port was placed last week and she is noticed some drainage on her bandage.  Patient denies fevers she denies redness or pain.  Patient has no other acute complaints at this time.  Patient being evaluated for likely cancer and Bell's palsy, patient has outpatient CT scheduled later today, she reports she does not wish acute evaluation of any of those conditions she just wants her port site evaluated    The history is provided by the patient.     Past Medical History:   Diagnosis Date    Cancer     Neuropathy      Past Surgical History:   Procedure Laterality Date     SECTION      TONSILLECTOMY      TUBAL LIGATION       History reviewed. No pertinent family history.  Social History   Substance Use Topics    Smoking status: Current Every Day Smoker     Packs/day: 0.50     Years: 15.00     Types: Cigarettes    Smokeless tobacco: None    Alcohol use No     Review of Systems   Constitutional: Negative for activity change, appetite change, chills, diaphoresis, fatigue and fever.   HENT: Negative for congestion, rhinorrhea, sore throat, trouble swallowing and voice change.    Eyes: Negative for visual disturbance.   Respiratory: Negative for cough, choking, chest tightness, shortness of breath, wheezing and stridor.    Cardiovascular: Negative for chest pain, palpitations and leg swelling.   Gastrointestinal: Negative for abdominal distention, abdominal pain, blood in stool, constipation, diarrhea, nausea and vomiting.   Genitourinary: Negative for difficulty urinating, dysuria, flank pain and frequency.   Musculoskeletal: Negative for arthralgias, back pain, joint swelling, myalgias and neck pain.   Skin: Negative for color change and  rash.        Mild serous ooze from right upper extremity chemotherapy port site   Neurological: Negative for dizziness, syncope, speech difficulty, weakness, numbness and headaches.   Hematological: Negative for adenopathy. Does not bruise/bleed easily.   All other systems reviewed and are negative.      Physical Exam   Initial Vitals   BP Pulse Resp Temp SpO2   02/25/17 0655 02/25/17 0655 02/25/17 0655 02/25/17 0655 02/25/17 0655   120/83 110 18 97.7 °F (36.5 °C) 97 %     Physical Exam    Nursing note and vitals reviewed.  Constitutional: She appears well-developed and well-nourished. She is not diaphoretic. No distress.   HENT:   Head: Normocephalic and atraumatic.   Right Ear: External ear normal.   Left Ear: External ear normal.   Nose: Nose normal.   Mouth/Throat: Oropharynx is clear and moist. No oropharyngeal exudate.   Eyes: Conjunctivae and EOM are normal. Pupils are equal, round, and reactive to light. Right eye exhibits no discharge. Left eye exhibits no discharge. No scleral icterus.   Neck: Normal range of motion. Neck supple. No thyromegaly present. No tracheal deviation present. No JVD present.   Cardiovascular: Regular rhythm, normal heart sounds and intact distal pulses. Exam reveals no gallop and no friction rub.    No murmur heard.  Pulmonary/Chest: Breath sounds normal. No stridor. No respiratory distress. She has no wheezes. She has no rhonchi. She has no rales. She exhibits no tenderness.   Abdominal: Soft. Bowel sounds are normal. She exhibits no distension and no mass. There is no tenderness. There is no rebound and no guarding.   Musculoskeletal: Normal range of motion. She exhibits edema. She exhibits no tenderness.   Patient has edema to bilateral lower extremities this is something that has been going on for several months   Lymphadenopathy:     She has no cervical adenopathy.   Neurological: She is alert and oriented to person, place, and time.   Patient has right-sided facial droop  which has been present for the past week, this is being evaluated by her doctors and patient has a CT scheduled today which she is here for an outpatient visit   Skin: Skin is warm and dry. No rash and no abscess noted. No erythema. No pallor.   Right upper arm port insertion site appears clean dry and intact there is some drainage which has dried onto her bandage, bandage removed and area cleaned there is no palpable warmth there is no palpable fluctuance, I am unable to express any drainage from the wound site to deep palpation, there is no erythema or abnormal bruising there is no rash, there does not appear to be any skin breakdown.  There are 2 sutures at port insertion site which appeared to be intact, wound appears to be healing well.  I see no evidence of acute infection.         ED Course   Procedures  Labs Reviewed - No data to display          Medical Decision Making:   History:   Old Medical Records: I decided to obtain old medical records.  Initial Assessment:   Patient here for evaluation of her port, I see no signs of infection at this time, port has not been used yet and thus I feel chances of infection are fairly low, patient blood work reviewed from previous visit last week and I see no signs of neutropenia, patient noted to be tachycardic however review of previous visits shows tachycardia then as well.  Patient offered blood work and further evaluation via imaging however she declined stating she was here only for quick evaluation of her port and then she needs to go get her outpatient CT done.  I see no signs of acute infection the area was cleaned and re-bandaged patient is to follow-up with her oncologist on Monday and is cautioned to return immediately to the ER for any worsening or any further concerns.                   ED Course     Clinical Impression:   The encounter diagnosis was Visit for wound check.          Camilo Donnelly MD  02/25/17 9608

## 2017-02-25 NOTE — ED AVS SNAPSHOT
[facility] [address] [place] [address]               Kat Corley   2017  6:58 AM   ED    Description:  Female : 1970   Department:  [facility]           Your Care was Coordinated By:     Provider Role From To    Camilo Donnelly MD Attending Provider 17 0720 --      Reason for Visit     IV access problem           Diagnoses this Visit        Comments    Visit for wound check    -  Primary       ED Disposition     None           To Do List           Follow-up Information     Follow up with your oncologist In 2 days.        Follow up with [facility].    Specialty:  Emergency Medicine    Why:  If symptoms worsen    Contact information:    [address] [place] [address] [phone] [phone]      [facility]     [facility] Nurse Care Line -  Assistance  Registered nurses in the [facility] provide clinical advisement, health education, appointment booking, and other advisory services.  Call for this free service at [phone].             Medications           Message regarding Medications     Verify the changes and/or additions to your medication regime listed below are the same as discussed with your clinician today.  If any of these changes or additions are incorrect, please notify your healthcare provider.             Verify that the below list of medications is an accurate representation of the medications you are currently taking.  If none reported, the list may be blank. If incorrect, please contact your healthcare provider. Carry this list with you in case of emergency.           Current Medications     apixaban 5 mg Tab Take 1 tablet (5 mg total) by mouth 2 (two) times daily.    clotrimazole (MYCELEX) 10 mg tanja Take 1 tablet (10 mg total) by mouth 5 (five) times daily.    cyanocobalamin (VITAMIN B-12) 100 MCG tablet Take 100 mcg by mouth once daily.     fentaNYL (DURAGESIC) 75 mcg/hr Place 1 patch onto the skin every 72 hours.    gabapentin (NEURONTIN) 300 MG capsule Take 300 mg by mouth 3 (three) times daily.    morphine (MS CONTIN) 30 MG 12 hr tablet Take 2 tablets (60 mg total) by mouth every 12 (twelve) hours.    multivitamin (ONE DAILY MULTIVITAMIN) per tablet Take 1 tablet by mouth once daily.    nicotine (NICODERM CQ) 14 mg/24 hr Place 1 patch onto the skin once daily.    ondansetron (ZOFRAN-ODT) 4 MG TbDL Take 1 tablet (4 mg total) by mouth every 8 (eight) hours as needed.    oxycodone (ROXICODONE) 10 mg Tab immediate release tablet Take 1 tablet (10 mg total) by mouth every 4 (four) hours as needed for Pain.    polyethylene glycol (GLYCOLAX) 17 gram PwPk Take 17 g by mouth once daily.    pyridoxine, vitamin B6, (VITAMIN B-6) 100 MG Tab Take 100 mg by mouth once daily.    senna-docusate 8.6-50 mg (PERICOLACE) 8.6-50 mg per tablet Take 1 tablet by mouth once daily.           Clinical Reference Information           Your Vitals Were     BP                   120/83           Allergies as of 2/25/2017     No Known Allergies      Immunizations Administered on Date of Encounter - 2/25/2017     None      ED Micro, Lab, POCT     None      ED Imaging Orders     None        Discharge Instructions         Wound Check, No Infection  Your wound is healing as expected. There are no signs of infection.   Home care  Continue to care for your wound as directed.  · Cover your wound with a bandage unless your healthcare provider tells you not to.  · Gently clean your wound with soap and water when you shower.   · Unless told otherwise, avoid swimming and taking tub baths until your wound has healed.  Follow-up care  Follow up with your healthcare provider as advised.  · If you have sutures or staples, return as directed to have them removed. If they are not taken out on time, they may be harder to remove and scarring may be worse. Infection may develop.  · If surgical tape  strips were used, you can remove them yourself if they have not fallen off by 10 days after they were applied.   When to seek medical advice  Call your healthcare provider right away if any of these occur:  · Fever of 100.4ºF (38ºC) or higher, or as directed by your health care provider  · Symptoms of a wound infection, including:  ¨ Redness or swelling around the wound  ¨ Warmth coming from the wound  ¨ New or worsening pain  ¨ Red streaks around the wound  ¨ Draining pus  Date Last Reviewed: 8/24/2015  © 3332-9285 [email or web address]. [address] [place] [address]. All rights reserved. This information is not intended as a substitute for professional medical care. Always follow your healthcare professional's instructions.          Your Scheduled Appointments     Feb 25, 2017  8:15 AM CST   Ct Head Non Contrast with [facility] CT1 LIMIT 400 LBS   [facility] ([place] [facility]) [address] [place] [id number] [phone]            Feb 27, 2017  9:00 AM CST   Established Patient Visit with Hung Rasmussen DO, FACP   [facility]ology Oncology ([facility] [[email or web address]])    [facility]  [place] [address]   [phone]            Feb 27, 2017 10:30 AM CST   Infusion 300 Min with [facility], CHEMO   [facility] ([facility] [[email or web address]])    [facility] [facility] [address]   [phone]              [facility] Sign-Up     Activating your [facility] account is as easy as 1-2-3!     1) Visit [email or web address], select Sign Up Now, enter this activation code and your date of birth, then select Next.  [email or web address]  Expires: 2/27/2017  1:08 PM      2) Create a username and password to use when you visit [facility] in the future and select a security question in case you lose your password and select Next.    3) Enter your e-mail address and click Sign Up!    Additional Information  If you have questions, please e-mail  [email or web address] or call [phone] to talk to our [facility] staff. Remember, [employer] is NOT to be used for urgent needs. For medical emergencies, dial 911.         Smoking Cessation     If you would like to quit smoking:   You may be eligible for free services if you are a [place] resident and started smoking cigarettes before September 1, 1988.  Call the Smoking Cessation Trust (SCT) toll free at (790) 964-0021 or (890) 392-6071.   Call 1-800-QUIT-NOW if you do not meet the above criteria.             [facility] complies with applicable Federal civil rights laws and does not discriminate on the basis of race, color, national origin, age, disability, or sex.        Language Assistance Services     ATTENTION: Language assistance services are available, free of charge. Please call [phone].      ATENCIÓN: Si habla español, tiene a bryant disposición servicios gratuitos de asistencia lingüística. Llame al [phone].     CHÚ Ý: N?u b?n nói Ti?ng Vi?t, có các d?ch v? h? tr? ngôn ng? mi?n phí dành cho b?n. G?i s? [phone].

## 2017-02-27 NOTE — Clinical Note
Follow up with Dr. Yeh and Dr. Rasmussen with lab work (CBC, CMP, Mg) and chemotherapy (Carbo/Taxol) on same day with Xgeva infusion. Thank you!

## 2017-02-27 NOTE — MR AVS SNAPSHOT
Patient Information     Patient Name Sex Kat Hankins Female 1970      Visit Information        Provider Department Dept Phone Center    2017 10:30 AM [facility], CHEMO [facility] Chemotherapy Infusion [phone] Isidro Genao      Patient Instructions     None      Your Current Medications Are     apixaban 5 mg Tab    cyanocobalamin (VITAMIN B-12) 100 MCG tablet    dexamethasone (DECADRON) 4 MG Tab    fentaNYL (DURAGESIC) 75 mcg/hr    gabapentin (NEURONTIN) 300 MG capsule    multivitamin (ONE DAILY MULTIVITAMIN) per tablet    nicotine (NICODERM CQ) 14 mg/24 hr    ondansetron (ZOFRAN-ODT) 4 MG TbDL    oxycodone (ROXICODONE) 10 mg Tab immediate release tablet    polyethylene glycol (GLYCOLAX) 17 gram PwPk    pyridoxine, vitamin B6, (VITAMIN B-6) 100 MG Tab    senna-docusate 8.6-50 mg (PERICOLACE) 8.6-50 mg per tablet    dexamethasone (DECADRON) 4 MG Tab (Discontinued)    fentaNYL (DURAGESIC) 75 mcg/hr (Discontinued)    morphine (MS CONTIN) 30 MG 12 hr tablet (Discontinued)    ondansetron (ZOFRAN-ODT) 4 MG TbDL (Discontinued)    (Magic mouthwash) 1:1:1 Benadryl 12.5mg/5ml liq, aluminum & magnesium hydroxide-simehticone (Maalox), lidocaine viscous 2%    magnesium oxide (MAGOX) 400 mg tablet    promethazine (PHENERGAN) 12.5 MG Tab      Facility-Administered Medications     carboplatin (PARAPLATIN) 900 mg in sodium chloride 0.9% 250 mL chemo infusion    diphenhydrAMINE injection 50 mg    diphenhydramine IVPB 50 mg    epinephrine (EPIPEN) 0.3 mg/0.3 mL pen injection 0.3 mg    famotidine (PF) 20 mg/2 mL injection 20 mg    heparin, porcine (PF) 100 unit/mL injection flush 500 Units    hydrocortisone sodium succinate injection 100 mg    paclitaxel (TAXOL) 175 mg/m2 = 312 mg in sodium chloride 0.9% 500 mL chemo infusion    palonosetron 0.25mg/dexamethasone 20mg IVPB    sodium chloride 0.9% 250 mL flush bag    sodium chloride 0.9% flush 10 mL      Appointments for Next Year     2017  5:15 PM MRI BRAIN CONT (45  min.) [facility] MRI1    Magnetic Resonance Imaging- You will not be allowed to have the MRI if you have a cardiac pace-maker, implantable defibrillator, neurostimulator, biostimulator, or if you have anuerysm clips in your brain (from many years ago).  WHAT YOUR DOCTOR NEEDS TO KNOW: You should tell your doctor if you have any metal in your body either from surgery or an injury. This includes metal pins, clips, plates, screws, shrapnel, ear implants, or permanent eyeliner. If female, you should tell your doctor if there is a chance you might be pregnant. Please bring with you any papers your doctor has given you to sign. Please bring with you a list of medications you are currently taking!  PLEASE REPORT TO THE MAGNETIC RESONANCE CENTER 30 MINUTES PRIOR TO YOUR SCHEDULED APPOINTMENT TIME.  WHAT IS THE PURPOSE OF THIS TEST: An MRI is a painless test that takes pictures of the inside of your body. The pictures are taken in slices which show only a few layers of body tissue at a time. Pictures taken this way can help your doctor find and see problems in the body more easily. The MRI uses a magnetic field and radio waves instead of X-RAYS.  WHERE WILL YOUR TEST BE DONE AND BY WHOM? The test will be done in the MRI building. It will be done under the supervision of a radiologist and a radiologic technologist. The person giving you these instructions will tell you where to report for this test. It usually takes 15 minutes to one hour.  HOW TO PREPARE FOR YOUR TEST: Wear comfortable clothing with no metal buttons or zippers. Do not wear jewelry including rings, earrings, necklaces, bracelets, or watches. Take all metal objects out of your hair. You will be asked to answer yes or no on a questionaire form regarding the possibility of any metal in your body. Please bring someone with you if you are unable to answer the questions. A parent must accompany any child having an MRI. Tell your doctor if  you are afraid of being in a closed or cramped space. Your doctor will decide if you need medicine to help you relax.  A small needle may be placed in a vein in your arm or hand so that you may receive a contrast solution. THIS IS NOT A DYE AND DOES NOT CONTAIN IODINE. NO special preparation for any MRI exam EXCEPT for a Magnetic Resonance Cholangiopancreatogram which the patient should fast 4 hours prior to the scheduled  appointment time. You may take your usual medication with a small sip of water.  WHAT TO EXPECT DURING YOUR TEST: The radiologic technologist will check to make sure that you have removed all metal objects. You will be asked to lie down on the table of the MRI machine. To allow for the best quality exam, it is VERY IMPORTANT that you LIE VERY STILL during your exam. When the test starts, the table will move into the open tunnel of the machine.  Once the machine starts taking pictures, you will hear loud hammering or grinding noises. You may be able to wear headphones and hear music to block out the loud noise of the machine. If your test requires the use of contrast material, a small needle will be placed in a vein of your arm or hand. The contrast material will be pushed through the IV tube that has been placed in your arm or hand. The skin around your IV may feel warm or cold. You should not have any changes in the way you feel. If you do, please tell the technologist.  WHAT TO EXPECT AFTER THE EXAM: If you were given medication to relax you, someone else will need to drive you home. DO NOT DRIVE OR USE HEAVY EQUIPMENT IF YOU TAKE MEDICATION THAT MAKES YOU DROWSY. You may resume normal daily activity if you did not receive sedation.  WHAT YOU NEED TO KNOW ABOUT YOUR APPOINTMENT: If you are unable to follow the above instructions or have questions or if you are delayed or unable to keep your scheduled appointment, please notify the following: In [place], call the Magnetic Resonance Center at  [phone] In [place], call the Radiology Department at [phone]. On the [facility], call the Radiology Department at [phone]. On the [facility], call the Radiology Department at [phone].    MRI Building         Default Flowsheet Data (last 24 hours)      Amb Complex Vitals Lele        02/27/17 1522 02/27/17 1318 02/27/17 1302 02/27/17 1239 02/27/17 1222    Measurements    /69 112/62 123/78   taxol 30 minutes 126/72   taxol 15 minutes 127/79   taxol start    Temp 97.8 °F (36.6 °C) 98 °F (36.7 °C) 98 °F (36.7 °C) 98.2 °F (36.8 °C) 97.8 °F (36.6 °C)    Pulse 101 72 71 71 100    Resp 18 18 16 16 18       02/27/17 1059 02/27/17 0824             Measurements    Weight  65.2 kg (143 lb 11.8 oz)       Height  5' 2" (1.575 m)       BSA (Calculated - sq m)  1.69 sq meters       BMI (Calculated)  26.3       /74 115/75       Temp 97.6 °F (36.4 °C) 97.7 °F (36.5 °C)       Pulse 85 110       Resp 18 18       SpO2  97 %       Pain Assessment    Pain Score Three Zero       Pain Loc LEG                Allergies     No Known Allergies      Medications You Received from 02/26/2017 1636 to 02/27/2017 1636        Date/Time Order Dose Route Action     02/27/2017 1532 carboplatin (PARAPLATIN) 900 mg in sodium chloride 0.9% 250 mL chemo infusion 900 mg Intravenous New Bag     02/27/2017 1133 diphenhydramine IVPB 50 mg 50 mg Intravenous New Bag     02/27/2017 1133 famotidine (PF) 20 mg/2 mL injection 20 mg 20 mg Intravenous Given     02/27/2017 1220 paclitaxel (TAXOL) 175 mg/m2 = 312 mg in sodium chloride 0.9% 500 mL chemo infusion 312 mg Intravenous New Bag     02/27/2017 1112 palonosetron 0.25mg/dexamethasone 20mg IVPB 0.25 mg Intravenous New Bag     02/27/2017 1105 sodium chloride 0.9% 250 mL flush bag   Intravenous New Bag      Current Discharge Medication List     Cannot display discharge medications since this is not an admission.      "

## 2017-02-27 NOTE — PLAN OF CARE
Problem: Patient Care Overview (Adult)  Goal: Individualization & Mutuality  Outcome: Ongoing (interventions implemented as appropriate)  1100-Labs , hx, and medications reviewed, patient seen by MD earlier today and consented for chemo treatment. Patient education on side effects of chemotherapy performed.  Assessment completed. Discussed plan of care with patient. Patient in agreement. Chair reclined and warm blanket and snack offered.

## 2017-02-27 NOTE — PROGRESS NOTES
PATIENT: Kat Corley  MRN: [id number]  DATE: 2/27/2017    Diagnosis:   1. Metastatic breast cancer    2. Encounter for antineoplastic chemotherapy    3. Edema, unspecified type    4. Hypomagnesemia    5. Nausea    6. Pain, neoplasm-related    7. Mucositis    8. Gaze palsy        Oncologic History:      Oncologic History Metastatic Adenocarcinoma of Breast (Triple Negative) - 1/10/17    Oncologic Treatment Zometa - 1/25/17  Radiation Treatment - 1/31/17- 2/13/17 : Spine T3-T10 (30 Gy) and L3 to Sacrum and B/L Femurs (30 Gy)  Carboplatin/Paclitaxel - 2/27/17      Pathology LEFT ANTERIOR ABDOMINAL MASS (NEEDLE BIOPSY) - 1/10/17:   -Positive for malignancy - Adenocarcinoma  -GCDFP (breast marker) is positive which is suspicious for breast origin. ER, DE, Her2 are negative.     Left adrenal, mass, cytology - 1/12/17:  - Positive for malignant cells, adenocarcinoma,     MANUEL HEPATIS LYMPH NODE (EUS FINE-NEEDLE ASPIRATION WITH PATHOLOGIST ADEQUACY) - 1/12/17:  -Negative for malignant cells  -Mixed population of lymphocytes       Chief Complaint: Metastatic Cancer of Breast    Interval Hx:  Mrs. Corley is a 45 y/o F with Triple Negative Metastatic Adenocarcinoma of Breast to skeleton, subcutaneous tissue, liver, and adrenal glands presents today for start of chemotherapy. Since her initial visit, she had undergo radiation for pain in the sacrum and low lumbar spine and in the thoracic spine. She had multiple admissions since her initial consult visit with regards to pain and weakness. Pain is better controlled now and on Fentanyl Patch and PRN Oxycodone. Today, she presents in a wheelchair, however, she states she is able to walk around at home and able to do ADLs without any issues. She feels weak, but better since hospital discharge. Appetite somewhat same. Denies any significant shortness of breath at rest, chest pain, or abdominal pain. Of note she does report having right eye ptosis which started end of last week,  she presented to local ER, but no imaging were done. Given concern for metastatic disease or vascular event. I was able to get her a CT of Head done over the weekend, which was unremarkable. She did not feel ER was necessary as she felt her symptoms were improving. However, today she still has ptosis and double vision when right eye kept open.          Oncologic Hx:  Mrs. Corley is a 47 y/o F who is referred to Medical Oncology Clinic for management of newly diagnosed Metastatic Adenocarcinoma of Unknown Primary. Her history dates back to early January 2017, when she presented to an OSH for evaluation of chest pain. She describes the pain as a burning sensation to her LEFT chest which is non-radiating and intermittent. She denies shortness of breath. Associated with 30 pounds weight loss in the past 4 months. She was evaluated in the ED and was found to have elevated lipase, liver enzymes and bilirubin. A CT of the abdomen revealed pancreatitis, dilated CBD, and extensive metastatic lesions in vertebral spine and abdomen.  CT brain, CT chest does not reveal any primary source of malignancy. CEA and CA 19-9 significantly elevated (5400) and (11874), respectively. Ultrasound guided biopsy of abd subcutaneous fat pad was consistent with adenocarcinoma. She was transferred to [facility] for AES evaluation for hyperbilirubinemia. EUS with FNA biopsy of lymph node and adrenal gland were performed, of which the adrenal mass consistent with adenocarcinoma. ERCP with a metal stent performed as well with resultant decrease in bilirubin upon discharge. Since discharge, she primarily has lower back and hip pain. Pain is somewhat alleviated with current pain regimen. Denies any yellowing of eyes or skin.     Review of Systems   Constitutional: Positive for activity change, appetite change, fatigue and unexpected weight change.   HENT: Negative for trouble swallowing.    Eyes: Positive for visual disturbance. Negative for photophobia,  "pain, discharge and redness.   Respiratory: Positive for wheezing. Negative for cough and shortness of breath.    Cardiovascular: Negative for chest pain and palpitations.   Gastrointestinal: Negative for abdominal distention, abdominal pain, diarrhea, nausea and vomiting.   Musculoskeletal: Positive for back pain.   Skin: Positive for wound.   Neurological: Positive for weakness. Negative for dizziness, seizures, facial asymmetry, numbness and headaches.   Hematological: Positive for adenopathy.   Psychiatric/Behavioral: Negative for agitation and confusion.       ECOG Performance Status: 2  Objective:      Vitals:   Vitals:    02/27/17 0824   BP: 115/75   Pulse: 110   Resp: 18   Temp: 97.7 °F (36.5 °C)   TempSrc: Oral   SpO2: 97%   Weight: 65.2 kg (143 lb 11.8 oz)   Height: 5' 2" (1.575 m)     BMI: Body mass index is 26.29 kg/(m^2).    Physical Exam   Constitutional: She is oriented to person, place, and time. No distress.   Appears weak, sitting in wheelchair   HENT:   Head: Normocephalic and atraumatic.   Mouth/Throat: Oropharynx is clear and moist. No oropharyngeal exudate.   Eyes: Conjunctivae are normal.   Right eye ptosis, decreased pupillary reflex on right, leftward gaze palsy.    Neck: Normal range of motion.   Cardiovascular: Regular rhythm and normal heart sounds.    Pulmonary/Chest: Effort normal.   Mild expiratory wheezing at bases.    Abdominal: Soft. Bowel sounds are normal. She exhibits no distension. There is no tenderness.   Musculoskeletal: Normal range of motion. She exhibits no edema.   Lymphadenopathy:     She has cervical adenopathy.   Neurological: She is alert and oriented to person, place, and time. A cranial nerve deficit is present.   Skin: Skin is warm and dry. She is not diaphoretic.   Psychiatric: Her behavior is normal. Judgment and thought content normal.   Vitals reviewed.      Laboratory Data:     WBC 9.10   RBC 3.53 (L)   Hemoglobin 10.0 (L)   Hematocrit 31.1 (L)   MCV 88   MCH " 28.3   MCHC 32.2   RDW 15.7 (H)   Platelets 385 (H)   MPV 10.6   Gran # 6.0     Sodium 141   Potassium 3.8   Chloride 97   CO2 33 (H)   Glucose 155 (H)   BUN, Bld 10   Creatinine 1.1   Calcium 11.1 (H)   Total Protein 6.9   Albumin 2.3 (L)   Total Bilirubin 1.6 (H)   Alkaline Phosphatase 338 (H)   AST 33   ALT 36   Anion Gap 11   eGFR if  >60.0   eGFR if non  >60.0       - : >60,000    - : >7770    - CEA: 5485    Imaging:     CT of Chest/Abdomen/Pelvis Jan, 2017:    Extensive rounded osteolytic bone metastases are identified in the sacrum lumbar and thoracic spines.  Bilateral adrenal masses are consistent with metastasis as well.  Dilation of the common bile duct and bulkiness of the pancreas without a focal mass defined yet.  A 14 mm mass between the stomach and spleen may represent a third accessory spleen or a metastasis.  A gallbladder is not identified. No primary malignant lesion is identified within the chest.    Pattern of metastatic disease:  -Bilateral adrenal gland masses  -Multiple metastatic lesions involving the visualized axial and appendicular skeleton including nondisplaced pathologic fractures involving the right posterior 12th and right posterior eighth ribs  -Mild mediastinal lymphadenopathy, favored to be reactive but less likely metastatic in origin  -Multiple small soft tissue masses involving the subcutaneous fat of the chest and upper abdomen, concerning for soft tissue metastasis.    MRI of Thoracic/Lumbar Spine 1/13/17:    Innumerable osseous metastatic lesions throughout the visualized thoracic and lumbar spine.  No fractures. Abnormal enlargement and enhancement of the exiting left L2 nerve root, findings that are suggestive of neural spread of disease possibly from direct contact from the adjacent osseous lesion.    Abnormal dilatation of the common bile duct and pancreatic duct without discrete mass at the level of the pancreas noted  limitations due to technique. Bilateral adrenal masses, likely metastatic in nature.    Bone Scan 1/13/17:  Diffuse bone metastases.    Assessment:       1. Triple Negative Metastatic Adenocarcinoma of the Breast  - discussed with patient about disease condition, management, and prognosis. Reviewed imaging and Pathology Reports. Explained to her that disease is advanced stage and is not curable but treatable.  - she has completed radiation therapy to thoracic spine, sacrum/femur due to progressively worsening lower back pain and concern for pathologic fractures  - received first treatment of Zometa on 1/25/17, advised to take Vitamin D/Ca++  - given her extensive tumor burden she will benefit from starting Carboplatin/Paclitaxel.   - chemotherapy regimen and adverse reaction explained to the patient   - will start C1 today and plan to start Xgeva with subsequent cycle and monthly thereafter.   - she would like to transfer her care to [facility] for convenience, will make arrangement for transfer of care.   - supportive care: antiemetics, [facility] soln prescribed    2. Right Eye Ptosis  - abnormal eye exam with decreased pupillary reflex and leftward gaze palsy in the right eye  - initial CT of Head w/o contrast unremarkable  - however, our concern is for metastatic lesions to the brain or leptomeningeal carcinomatosis  - discussed with patient of the seriousness of the situation  - given the significant tumor burden of her disease and subsequent delays in treatment due to multiple hospital admissions, we will proceed with chemotherapy today and have scheduled for an urgent MRI of Brain at 5 pm today.  - if results are concerning then will ask to visit for subsequent management, likely Neurosurgery evaluation.   - if imaging is unremarkable, then will refer her to Ophthalmology, although suspicion remains very high for metastatic brain lesions/leptomeningeal disease.     3. Pain related to Neoplasm  - completed  palliative RT  - currently on Fentanyl and Oxycodone. She is satisfied with current regimen, therefore, will adjust if needed in future.  - advised her to take stool softners PRN.     4. Small Subsegmental PE  - on eliquis    Follow up MRI of Brain  Will make arrangement for transfer of care to [facility]  Will tentatively schedule for follow up here in 3 weeks with lab work and C2 of Carbo/Taxol/Xgeva.     Case discussed with Dr. Grady Yeh MD  Hematology/Oncology Fellow         Attending Addendum:  The patient was seen, examined and discussed in the clinic with the fellow.  I agree with the assessment and plan as outlined above for Kat Corley.  Patient will proceed on with chemotherapy today.  Check MRI the brain today.  She understands that she may have progressive disease in the brain and will notify her once we have results.  She does wish to follow-up in [place] and will transfer care.  All questions were answered and she is agreeable with this plan.    Hung Rasmussen DO, FACP  Hematology & Oncology  [facility] [place] [address]  ph. [phone]  Fax. [phone]    25 minutes were spent in coordination of patient's care, record review and counseling.  More than 50% of the time was face-to-face.

## 2017-02-27 NOTE — PLAN OF CARE
Problem: Patient Care Overview (Adult)  Goal: Plan of Care Review  Outcome: Ongoing (interventions implemented as appropriate)  1648-Patient tolerated treatment well though she does report pain and discomfort from sitting in chair for so long.  Discharged without complaints or S/S of adverse event. AVS given.  Instructed to call provider for any questions or concerns. Patient left the unit in a wheelchair pushed by partner, headed to MRI.  Patient verbalized undersanding signs or symptoms for which to contact MD.

## 2017-03-01 NOTE — TELEPHONE ENCOUNTER
----- Message from Sylvester Yeh MD sent at 3/1/2017  7:54 AM CST -----  Zenia Jamison,    I am in my in training exam today, so will be away from my computer till the mid afternoon, but can you please go ahead an schedule Ms. Corley's Ophthalmology appointment either at [facility] or at [facility] if she prefers as ASAP.     Thank you,    Sylvester

## 2017-03-01 NOTE — TELEPHONE ENCOUNTER
----- Message from Sylvester Yeh MD sent at 2/27/2017  1:59 PM CST -----  Good Afternoon Lyla,    Can you help arrange for initial consult/transfer of care for Ms. Corley for next week the earliest with an Oncologist at [facility], given she wants to be closer to home.    Please let me know what I need to do.     Sylvester

## 2017-03-01 NOTE — TELEPHONE ENCOUNTER
left a voice message regarding patient's scheduled appointment with ophthalmology.  Patient is scheduled on 3/3 at 9:00am  Patient was asked to callback Dr. Rasmussen's office to confirm appointment.

## 2017-03-01 NOTE — TELEPHONE ENCOUNTER
Message has been sent to Elias Arriola and [facility] to call and set up an appointment with this patient.   A referral may be needed to transfer care to [place]  I will let Dr. Rasmussen and Dr. Yeh know.

## 2017-03-10 PROBLEM — E86.1 INTRAVASCULAR VOLUME DEPLETION: Status: ACTIVE | Noted: 2017-01-01

## 2017-03-10 NOTE — IP AVS SNAPSHOT
[address] [place] [address]  Phone: [phone]           Patient Discharge Instructions     Our goal is to set you up for success. This packet includes information on your condition, medications, and your home care. It will help you to care for yourself so you don't get sicker and need to go back to the hospital.     Please ask your nurse if you have any questions.        There are many details to remember when preparing to leave the hospital. Here is what you will need to do:    1. Take your medicine. If you are prescribed medications, review your Medication List in the following pages. You may have new medications to  at the pharmacy and others that you'll need to stop taking. Review the instructions for how and when to take your medications. Talk with your doctor or nurses if you are unsure of what to do.     2. Go to your follow-up appointments. Specific follow-up information is listed in the following pages. Your may be contacted by a transition nurse or clinical provider about future appointments. Be sure we have all of the phone numbers to reach you, if needed. Please contact your provider's office if you are unable to make an appointment.     3. Watch for warning signs. Your doctor or nurse will give you detailed warning signs to watch for and when to call for assistance. These instructions may also include educational information about your condition. If you experience any of warning signs to your health, call your doctor.               [facility]  Unless otherwise directed by your provider, please contact [facility], our nurse care line that is available for 24/7 assistance.     [phone] (toll-free)    Registered nurses in the [facility] provide clinical advisement, health education, appointment booking, and other advisory services.                    ** Verify the list of medication(s) below is accurate and up to date.  Carry this with you in case of emergency. If your medications have changed, please notify your healthcare provider.             Medication List      START taking these medications        Additional Info                      amoxicillin-clavulanate 875-125mg 875-125 mg per tablet   Commonly known as:  AUGMENTIN   Quantity:  14 tablet   Refills:  0   Dose:  1 tablet    Instructions:  Take 1 tablet by mouth 2 (two) times daily.     Begin Date    AM    Noon    PM    Bedtime       levoFLOXacin 750 MG tablet   Commonly known as:  LEVAQUIN   Quantity:  5 tablet   Refills:  0   Dose:  750 mg    Instructions:  Take 1 tablet (750 mg total) by mouth once daily.     Begin Date    AM    Noon    PM    Bedtime       predniSONE 20 MG tablet   Commonly known as:  DELTASONE   Quantity:  20 tablet   Refills:  0    Instructions:  Take 3 pills daily for 3 days, then 2 pills daily for 3 days, then 1 pill daily for 3 days, then 1/2 pill daily for 4 days.     Begin Date    AM    Noon    PM    Bedtime         CONTINUE taking these medications        Additional Info                      (MAGIC MOUTHWASH) 1:1:1 BENADRYL 12.5MG/5ML LIQ, ALUMINUM & MAGNESIUM   Quantity:  360 mL   Refills:  0   Dose:  10 mL    Instructions:  Swish and spit 10 mLs every 4 (four) hours as needed (mucositis, mouth sores). Mix equal amounts of benadryl, maalox, 2% viscous lidocaine  Swish and swallow 10 ml 4 times a day     Begin Date    AM    Noon    PM    Bedtime       fentaNYL 75 mcg/hr   Commonly known as:  DURAGESIC   Quantity:  5 patch   Refills:  0   Dose:  1 patch    Last time this was given:  1 patch on 3/11/2017  2:31 AM   Instructions:  Place 1 patch onto the skin every 72 hours.     Begin Date    AM    Noon    PM    Bedtime       gabapentin 300 MG capsule   Commonly known as:  NEURONTIN   Refills:  0   Dose:  300 mg    Last time this was given:  300 mg on 3/13/2017  5:20 AM   Instructions:  Take 300 mg by mouth 3 (three) times daily.     Begin Date    AM     Noon    PM    Bedtime       magnesium oxide 400 mg tablet   Commonly known as:  MAGOX   Quantity:  10 tablet   Refills:  1   Dose:  400 mg    Instructions:  Take 1 tablet (400 mg total) by mouth 2 (two) times daily.     Begin Date    AM    Noon    PM    Bedtime       nicotine 14 mg/24 hr   Commonly known as:  NICODERM CQ   Refills:  0   Dose:  1 patch    Instructions:  Place 1 patch onto the skin once daily.     Begin Date    AM    Noon    PM    Bedtime       ondansetron 4 MG Tbdl   Commonly known as:  ZOFRAN-ODT   Quantity:  60 tablet   Refills:  3   Dose:  4 mg    Instructions:  Take 1 tablet (4 mg total) by mouth every 8 (eight) hours as needed.     Begin Date    AM    Noon    PM    Bedtime       ONE DAILY MULTIVITAMIN per tablet   Refills:  0   Dose:  1 tablet   Generic drug:  multivitamin    Instructions:  Take 1 tablet by mouth once daily.     Begin Date    AM    Noon    PM    Bedtime       oxycodone 10 mg Tab immediate release tablet   Commonly known as:  ROXICODONE   Quantity:  60 tablet   Refills:  0   Dose:  10 mg    Last time this was given:  10 mg on 3/13/2017 10:21 AM   Instructions:  Take 1 tablet (10 mg total) by mouth every 4 (four) hours as needed for Pain.     Begin Date    AM    Noon    PM    Bedtime       polyethylene glycol 17 gram Pwpk   Commonly known as:  GLYCOLAX   Quantity:  30 each   Refills:  2   Dose:  17 g    Instructions:  Take 17 g by mouth once daily.     Begin Date    AM    Noon    PM    Bedtime       promethazine 12.5 MG Tab   Commonly known as:  PHENERGAN   Quantity:  60 tablet   Refills:  3   Dose:  12.5 mg    Instructions:  Take 1 tablet (12.5 mg total) by mouth every 6 (six) hours as needed.     Begin Date    AM    Noon    PM    Bedtime       senna-docusate 8.6-50 mg 8.6-50 mg per tablet   Commonly known as:  PERICOLACE   Refills:  0   Dose:  1 tablet    Instructions:  Take 1 tablet by mouth once daily.     Begin Date    AM    Noon    PM    Bedtime       VITAMIN B-12 100 MCG  tablet   Refills:  0   Dose:  100 mcg   Generic drug:  cyanocobalamin    Instructions:  Take 100 mcg by mouth once daily.     Begin Date    AM    Noon    PM    Bedtime       VITAMIN B-6 100 MG Tab   Refills:  0   Dose:  100 mg   Generic drug:  pyridoxine (vitamin B6)    Instructions:  Take 100 mg by mouth once daily.     Begin Date    AM    Noon    PM    Bedtime         STOP taking these medications     apixaban 5 mg Tab       dexamethasone 4 MG Tab   Commonly known as:  DECADRON            Where to Get Your Medications      These medications were sent to [facility]/pharmacy #[id number] - [place] - [address]  [address], [place] [address]     Phone:  [phone]     amoxicillin-clavulanate 875-125mg 875-125 mg per tablet    levoFLOXacin 750 MG tablet    predniSONE 20 MG tablet                  Please bring to all follow up appointments:    1. A copy of your discharge instructions.  2. All medicines you are currently taking in their original bottles.  3. Identification and insurance card.    Please arrive 15 minutes ahead of scheduled appointment time.    Please call 24 hours in advance if you must reschedule your appointment and/or time.        Your Scheduled Appointments     Mar 13, 2017  2:20 PM CDT   Consult with MD [place] Montalvo - Hematology Oncology ([facility] - Building [facility])    [address] [place] [address] [address]  [facility] [address] [phone]              Follow-up Information     Follow up with NAMITA Guerin In 1 week.    Specialty:  Family Medicine    Why:  hosptial follow up on 3-20-17 @ 11 a.m.    Contact information:    [address] [facility] [address]  [phone]          Follow up with [facility]    Specialty:  OLAMIDE Provider    Why:  OLAMIDE-rolling walker, 3-1 commode    Contact information:    [address]  [facility]  [facility] [address]  [phone]        Referrals     Future Orders    Referral to Home health         Discharge Instructions     Future  "Orders    3 IN 1 COMMODE FOR HOME USE     Questions:    Type:  Standard    Height:  5' 2" (1.575 m)    Weight:  72.6 kg (160 lb)    Does patient have medical equipment at home?:      Length of need (1-99 months):  99    Activity as tolerated     Diet general     Questions:    Total calories:      Fat restriction, if any:      Protein restriction, if any:      Na restriction, if any:      Fluid restriction:      Additional restrictions:      WALKER FOR HOME USE     Questions:    Type of Walker:  Adult (5'4"-6'6")    With wheels?:  Yes    Height:  5' 2" (1.575 m)    Weight:  72.6 kg (160 lb)    Length of need (1-99 months):  99    Platform attachment:      Accessories/Other:      Assistance needed:      Does patient have medical equipment at home?:      Please check all that apply:  Patient's condition impairs ambulation.    Patient needs help to get in and out of chair.    Patient is unable to safely ambulate without equipment.        Discharge Instructions       Thank you for choosing [facility] for your medical care. The primary doctor who is taking care of you at the time of your discharge is Shahrzad Plasencia MD.     You were admitted to the hospital with Intravascular volume depletion.     Please note your discharge instructions, including diet/activity restrictions, follow-up appointments, and medication changes.  If you have any questions about your medical issues, prescriptions, or any other questions, please feel free to contact the [facility] Medicine Dept at [phone] and we will help.    If you are previously with Home health, outpatient PT/OT or under a therapy program, you are cleared to return to those programs.    Please direct all long term medication refills and follow up to your primary care provider, NAMITA Guerin. Thank you again for letting us take care of your health care needs.    Please note the following discharge instructions per your discharging " "physician-  Take all meds as prescribed.         Primary Diagnosis     Your primary diagnosis was:  Decreased Plasma Volume      Admission Information     Date & Time Provider Department CSN    3/10/2017  8:42 PM Shahrzad Plasencia MD [facility] [id number]      Care Providers     Provider Role Specialty Primary office phone    Shahrzad Plasencia MD Attending Provider Family Medicine [phone]    Herlinda Arriola MD Consulting Physician  Hematology and Oncology [phone]      Your Vitals Were     BP Pulse Temp Resp Height Weight    132/86 86 97.7 °F (36.5 °C) 18 5' 2" (1.575 m) 72.6 kg (160 lb)    Last Period SpO2 BMI          01/23/2017 96% 29.26 kg/m2        Recent Lab Values        1/9/2017 2/22/2017                        5:45 AM  7:53 PM          A1C 6.4 (H) 6.3 (H)          Comment for A1C at  5:45 AM on 1/9/2017:  According to ADA guidelines, hemoglobin A1C <7.0% represents  optimal control in non-pregnant diabetic patients.  Different  metrics may apply to specific populations.   Standards of Medical Care in Diabetes - 2016.  For the purpose of screening for the presence of diabetes:  <5.7%     Consistent with the absence of diabetes  5.7-6.4%  Consistent with increasing risk for diabetes   (prediabetes)  >or=6.5%  Consistent with diabetes  Currently no consensus exists for use of hemoglobin A1C  for diagnosis of diabetes for children.      Comment for A1C at  7:53 PM on 2/22/2017:  According to ADA guidelines, hemoglobin A1C <7.0% represents  optimal control in non-pregnant diabetic patients.  Different  metrics may apply to specific populations.   Standards of Medical Care in Diabetes - 2016.  For the purpose of screening for the presence of diabetes:  <5.7%     Consistent with the absence of diabetes  5.7-6.4%  Consistent with increasing risk for diabetes   (prediabetes)  >or=6.5%  Consistent with diabetes  Currently no consensus exists for use of hemoglobin A1C  for diagnosis of " diabetes for children.        Pending Labs     Order Current Status    Prepare Platelets 1 Dose Preliminary result    Prepare RBC 2 Units; symptomatic anemia Preliminary result      Allergies as of 3/13/2017     No Known Allergies      Advance Directives     An advance directive is a document which, in the event you are no longer able to make decisions for yourself, tells your healthcare team what kind of treatment you do or do not want to receive, or who you would like to make those decisions for you.  If you do not currently have an advance directive, [facility] encourages you to create one.  For more information call:  [phone]), [phone] ([phone]),  or log on to [email or web address].        Language Assistance Services     ATTENTION: Language assistance services are available, free of charge. Please call [phone].      ATENCIÓN: Si habla español, tiene a bryant disposición servicios gratuitos de asistencia lingüística. Llame al [phone].     [facility] Ý: N?u b?n nói Ti?ng Vi?t, có các d?ch v? h? tr? ngôn ng? mi?n phí dành cho b?n. G?i s? [phone].        Blood Transfusion Reaction Signs and Symptoms     The blood you have received has been matched for you as carefully as possible. Most patients who receive a blood transfusion do not experience problems. However, there can be a delayed reaction that happens a few weeks after your blood transfusion. Contact your physician immediately if you experience any NEW SYMPTOMS listed below:     Fever greater than 100.4 degrees    Chills   Yellow color to your skin or eyes(Jaundice)   Back pain, chest pain, or pain at the infusion site   Weakness (more than usual)   Discomfort or uneasiness more than usual (Malaise)   Nausea or vomiting   Shortness of breath, wheezing, or coughing   Higher or lower blood pressure than normal   Skin rash, itching, skin redness, or localized swelling (example: hands or feet)   Urinating less than  normal   Urine appears reddish or orange and is darker than normal      Remember that some these signs may already exist for you--such as having chronic back pain or high blood pressure. You only need to look for and report to your doctor any new occurrences since your blood transfusion that are of concern.        Pneumonmia Discharge Instructions                 [facility] complies with applicable Federal civil rights laws and does not discriminate on the basis of race, color, national origin, age, disability, or sex.

## 2017-03-11 PROBLEM — D70.1 CHEMOTHERAPY-INDUCED NEUTROPENIA: Status: ACTIVE | Noted: 2017-01-01

## 2017-03-11 PROBLEM — C79.51 PAIN DUE TO MALIGNANT NEOPLASM METASTATIC TO BONE: Status: ACTIVE | Noted: 2017-01-01

## 2017-03-11 PROBLEM — D75.839 THROMBOCYTOSIS: Status: RESOLVED | Noted: 2017-01-01 | Resolved: 2017-01-01

## 2017-03-11 PROBLEM — T45.1X5A CHEMOTHERAPY-INDUCED NEUTROPENIA: Status: ACTIVE | Noted: 2017-01-01

## 2017-03-11 PROBLEM — E87.1 HYPONATREMIA: Status: ACTIVE | Noted: 2017-01-01

## 2017-03-11 PROBLEM — R73.03 PREDIABETES: Status: RESOLVED | Noted: 2017-01-01 | Resolved: 2017-01-01

## 2017-03-11 PROBLEM — G89.3 PAIN DUE TO MALIGNANT NEOPLASM METASTATIC TO BONE: Status: ACTIVE | Noted: 2017-01-01

## 2017-03-11 PROBLEM — Z86.711 HISTORY OF PULMONARY EMBOLISM: Status: ACTIVE | Noted: 2017-01-01

## 2017-03-11 PROBLEM — K85.90 PANCREATITIS, ACUTE: Status: RESOLVED | Noted: 2017-01-01 | Resolved: 2017-01-01

## 2017-03-11 PROBLEM — R11.2 INTRACTABLE VOMITING WITH NAUSEA: Status: RESOLVED | Noted: 2017-01-01 | Resolved: 2017-01-01

## 2017-03-11 PROBLEM — D61.811 DRUG-INDUCED PANCYTOPENIA: Status: ACTIVE | Noted: 2017-01-01

## 2017-03-11 PROBLEM — R11.2 INTRACTABLE NAUSEA AND VOMITING: Status: RESOLVED | Noted: 2017-01-01 | Resolved: 2017-01-01

## 2017-03-11 NOTE — CONSULTS
Nutrition education for neutropenic (low bacteria) diet provided.  Pt accepted instruction. See education tab.  Handout provided.

## 2017-03-11 NOTE — ASSESSMENT & PLAN NOTE
Severe, intractable pain of back and left femur  Pt declined re-imaging to follow up on bone mets since XRT  Started fentanyl patch at home dose for pt had not had it filled  Ordered dilaudid PCA with C02 monitoring  Add steroids   Pt may benefit from additional XRT, will defer to oncology to make that decision and referral

## 2017-03-11 NOTE — ED NOTES
"Patient identifiers for Kat Corley checked and correct.  LOC: Patient is awake, alert, and aware of environment with an appropriate affect. Patient is oriented x 3 and speaking appropriately.  APPEARANCE: Patient presents to ER with c/o generalized weakness which has been ongoing x 2 days. Pt recently began chemotherapy "a few weeks ago" and intermittently experiences nausea and vomiting following. She states she ran out of her antiemetic medications yesterday. Pt also has complaints of lower abdominal pain and states her last BM was yesterday.  Patient is clean and well groomed, patient's clothing is properly fastened. Cachectic. SKIN: The skin is warm and dry. Patient has normal skin turgor and moist mucus membrances. Bruising to bilat knees r/t fall. MUSCULOSKELETAL: Patient is moving all extremities well, no obvious deformities noted. Pulses intact.   RESPIRATORY: Airway is open and patent. Respirations are spontaneous and non-labored with normal effort and rate. Pt placed on continuous pulse ox.  CARDIAC: Patient has a tachycardic rate and rhythm. No peripheral edema noted. Capillary refill < 3 seconds. Pt placed on continuous cardiac monitor.  ABDOMEN: No distention noted. Bowel sounds active in all 4 quadrants. Generalized tenderness to abd. Pt c/o nausea.   NEUROLOGICAL: PERRL. Facial expression is symmetrical. Hand grasps are equal bilaterally. Normal sensation in all extremities when touched with finger.  Allergies reported: Review of patient's allergies indicates:  No Known Allergies  Bed locked, lowest position. Call light within easy reach. Side rails up x2.      "

## 2017-03-11 NOTE — PLAN OF CARE
Straight cath done per order after bladder scan showed greater than 465 of urine. Pt. Has milky, white odorus discharge. Sterile technique followed. Drained 925ml, dark yellow, odorus urine. UA collected at this time. Will cont. To monitor.

## 2017-03-11 NOTE — PLAN OF CARE
Critical labs called. Amber Forrester NP notified. WBC 0.9. H&H 6.3/18.9, PLT 14. Awaiting new orders.

## 2017-03-11 NOTE — PLAN OF CARE
Pt. Received to  via stretcher. Pt. Wearing mask. Pt. To be on neutropenic precautions.  Pt. Transferred to bed with assist from staff. Pt. Crying, pain 10/10. VSS taken. Tele monitor placed. Oriented to room and use of call light. Amber Forrester NP notified of pt. Arrival and need for pain meds. Will cont. To monitor.

## 2017-03-11 NOTE — ASSESSMENT & PLAN NOTE
C1 D12 of carbo/taxol, still shy of keagan counts  Neutropenia precautions  Start colony stimulating factor if ok with oncology who has been consulted  Trend counts

## 2017-03-11 NOTE — ED PROVIDER NOTES
"Encounter Date: 3/10/2017    SCRIBE #1 NOTE: I, Kathi Thomas, am scribing for, and in the presence of, Dr Vora.       History     Chief Complaint   Patient presents with    Weakness     X 2 days     Emesis     X 2 days      Review of patient's allergies indicates:  No Known Allergies  HPI Comments: 03/10/2017  8:55 PM     Chief Complaint: Weakness      Kat Corley is a 46 y.o. female with a pmhx of Cancer and Neuropathy presenting to the E.D. with an acute onset of generalized weakness which has been ongoing x 2 days. Pt recently began chemotherapy "a few weeks ago" and intermittently experiences nausea and vomiting following. She states she ran out of her antiemetic medications yesterday. Pt also has complaints of lower abdominal pain and states her last BM was yesterday. No exacerbating factors. Pt has a past surgical history that includes Tonsillectomy;  section; and Tubal ligation.      The history is provided by the patient.     Past Medical History:   Diagnosis Date    Cancer     Neuropathy      Past Surgical History:   Procedure Laterality Date     SECTION      TONSILLECTOMY      TUBAL LIGATION       No family history on file.  Social History   Substance Use Topics    Smoking status: Current Every Day Smoker     Packs/day: 0.50     Years: 15.00     Types: Cigarettes    Smokeless tobacco: Not on file    Alcohol use No     Review of Systems   Constitutional: Negative for fever.   HENT: Negative for sore throat.    Eyes: Negative for visual disturbance.   Respiratory: Negative for cough.    Cardiovascular: Negative for chest pain.   Gastrointestinal: Positive for abdominal pain, nausea and vomiting. Negative for diarrhea.   Genitourinary: Negative for difficulty urinating and pelvic pain.   Musculoskeletal: Negative for arthralgias.   Skin: Negative for rash.   Neurological: Positive for weakness (generalized ).       Physical Exam   Initial Vitals   BP Pulse Resp Temp SpO2 "   03/10/17 1927 03/10/17 1927 03/10/17 1927 03/10/17 1927 03/10/17 1927   110/64 133 16 97.2 °F (36.2 °C) 93 %     Physical Exam    Nursing note and vitals reviewed.  Constitutional: She appears well-developed. She appears distressed.   HENT:   Head: Normocephalic and atraumatic.   Dry mucous membranes   Eyes: Conjunctivae are normal.   Ptosis to right eye.    Neck: Neck supple.   Cardiovascular: Normal rate, normal heart sounds and intact distal pulses. Exam reveals no gallop and no friction rub.    No murmur heard.  Tachycardic and regular   Pulmonary/Chest: Breath sounds normal. She has no wheezes. She has no rhonchi. She has no rales.   Abdominal: Soft. She exhibits no distension. There is tenderness.   Tenderness in lower abdomen, abdomen is nonrigid nondistended   Musculoskeletal: Normal range of motion. She exhibits no edema.   Neurological: She is alert and oriented to person, place, and time. No cranial nerve deficit.   Skin: No rash noted. No erythema. There is pallor.   Psychiatric: She has a normal mood and affect.         ED Course   Procedures  Labs Reviewed - No data to display  EKG Readings: (Independently Interpreted)   Sinus tachycardia, 126 bpm, left axis deviation, normal intervals, no acute ST segment elevation or depression concerning for ischemia, no significant change from prior study a month ago          Medical Decision Making:   Initial Assessment:   Patient was interviewed and examined and found to be tachycardic and moderate to severely dehydrated on clinical examination.  She reports she has been out of her antiemetic medication for multiple days now this having diarrhea and difficulty holding anything down.  She will be provided multiple boluses of saline hydration.  Screening lab work and imaging , including a CT scan of the abdomen and pelvis with IV dye will be obtained rule out evidence of focal infectious process.  Differential Diagnosis:   DDX include, but are not limited to,  chemotherapy nausea, colitis, appendicitis, small bowel obstructive versus ileus, pancreatitis, gastroenteritis, cystitis  ED Management:  Patient did have some improvement in symptoms and pulse rate after medication here.  Screening lab work is remarkable for pancytopenia with a new thrombocytopenia down to 20,000.  Patient is markedly hypomagnesemic, hyponatremic, hypochloremic and hypokalemic, with a mild anion gap elevation.  She was provided 1 g of IV magnesium and oral potassium supplementation.  CT scan of the abdomen and pelvis indicates mild wall thickening the right hemicolon that is new and may represent colitis.  Multiple metastatic lesions are seen diffusely, as well as mets noted in subcutaneous fat and bilateral adrenals.  She does warrant admission for stabilization of her acute severe dehydration and I did speak to Mrs. Forrester, regarding admission which he accepted.  She was transferred to telemetry bed in fair condition.             Scribe Attestation:   Scribe #1: I performed the above scribed service and the documentation accurately describes the services I performed. I attest to the accuracy of the note.    Attending Attestation:           Physician Attestation for Scribe:  Physician Attestation Statement for Scribe #1: I, Dr Vora, reviewed documentation, as scribed by Kathi Thomas in my presence, and it is both accurate and complete.                 ED Course     Clinical Impression:   The primary encounter diagnosis was Intractable vomiting with nausea, unspecified vomiting type. Diagnoses of Intravascular volume depletion, Hypomagnesemia, and Metastatic disease were also pertinent to this visit.      Disposition:   Disposition: Admitted  Condition: Fair         Artur Vora MD  03/11/17 0012

## 2017-03-11 NOTE — ASSESSMENT & PLAN NOTE
Transfuse 2 units today  No bleeding with platelets at 14 K, will prepare one unit to have on hand  Recheck CBC tonight

## 2017-03-11 NOTE — SUBJECTIVE & OBJECTIVE
Past Medical History:   Diagnosis Date    Cancer     Neuropathy        Past Surgical History:   Procedure Laterality Date     SECTION      TONSILLECTOMY      TUBAL LIGATION         Review of patient's allergies indicates:  No Known Allergies    No current facility-administered medications on file prior to encounter.      Current Outpatient Prescriptions on File Prior to Encounter   Medication Sig    apixaban 5 mg Tab Take 1 tablet (5 mg total) by mouth 2 (two) times daily.    dexamethasone (DECADRON) 4 MG Tab Take 2 tablets (8 mg total) by mouth every 12 (twelve) hours.    gabapentin (NEURONTIN) 300 MG capsule Take 300 mg by mouth 3 (three) times daily.    magnesium oxide (MAGOX) 400 mg tablet Take 1 tablet (400 mg total) by mouth 2 (two) times daily.    multivitamin (ONE DAILY MULTIVITAMIN) per tablet Take 1 tablet by mouth once daily.    nicotine (NICODERM CQ) 14 mg/24 hr Place 1 patch onto the skin once daily.    oxycodone (ROXICODONE) 10 mg Tab immediate release tablet Take 1 tablet (10 mg total) by mouth every 4 (four) hours as needed for Pain.    (Magic mouthwash) 1:1:1 Benadryl 12.5mg/5ml liq, aluminum & magnesium hydroxide-simehticone (Maalox), lidocaine viscous 2% Swish and spit 10 mLs every 4 (four) hours as needed (mucositis, mouth sores). Mix equal amounts of benadryl, maalox, 2% viscous lidocaine    Swish and swallow 10 ml 4 times a day    cyanocobalamin (VITAMIN B-12) 100 MCG tablet Take 100 mcg by mouth once daily.    fentaNYL (DURAGESIC) 75 mcg/hr Place 1 patch onto the skin every 72 hours.    ondansetron (ZOFRAN-ODT) 4 MG TbDL Take 1 tablet (4 mg total) by mouth every 8 (eight) hours as needed.    polyethylene glycol (GLYCOLAX) 17 gram PwPk Take 17 g by mouth once daily.    promethazine (PHENERGAN) 12.5 MG Tab Take 1 tablet (12.5 mg total) by mouth every 6 (six) hours as needed.    pyridoxine, vitamin B6, (VITAMIN B-6) 100 MG Tab Take 100 mg by mouth once daily.     senna-docusate 8.6-50 mg (PERICOLACE) 8.6-50 mg per tablet Take 1 tablet by mouth once daily.     Family History     None        Social History Main Topics    Smoking status: Current Every Day Smoker     Packs/day: 0.50     Years: 15.00     Types: Cigarettes    Smokeless tobacco: Not on file    Alcohol use No    Drug use: Not on file    Sexual activity: Yes     Review of Systems   Constitutional: Positive for activity change, appetite change, fatigue and unexpected weight change. Negative for chills, diaphoresis and fever.   HENT: Positive for mouth sores. Negative for congestion, facial swelling and nosebleeds.    Eyes: Negative for redness and itching.   Respiratory: Negative for cough, chest tightness, shortness of breath and wheezing.    Cardiovascular: Negative for chest pain, palpitations and leg swelling.   Gastrointestinal: Positive for nausea and vomiting. Negative for abdominal pain, anal bleeding, blood in stool, constipation and diarrhea.   Endocrine: Negative for cold intolerance and heat intolerance.   Genitourinary: Positive for difficulty urinating. Negative for dysuria, flank pain, frequency, hematuria, urgency and vaginal bleeding.   Musculoskeletal: Positive for arthralgias and back pain. Negative for joint swelling, myalgias and neck pain.   Skin: Positive for pallor.   Neurological: Negative for dizziness, syncope, weakness and headaches.   Hematological: Does not bruise/bleed easily.   Psychiatric/Behavioral: Negative for agitation, confusion and hallucinations. The patient is not nervous/anxious.      Objective:     Vital Signs (Most Recent):  Temp: 98.4 °F (36.9 °C) (03/11/17 0500)  Pulse: 104 (03/11/17 0500)  Resp: (!) 28 (03/11/17 0800)  BP: 119/77 (03/11/17 0500)  SpO2: 97 % (03/11/17 0800) Vital Signs (24h Range):  Temp:  [97.2 °F (36.2 °C)-98.7 °F (37.1 °C)] 98.4 °F (36.9 °C)  Pulse:  [104-133] 104  Resp:  [16-28] 28  SpO2:  [93 %-100 %] 97 %  BP: (110-123)/(64-77) 119/77      Weight: 72.6 kg (160 lb)  Body mass index is 29.26 kg/(m^2).    Physical Exam   Constitutional: She is oriented to person, place, and time. No distress.   Appears acutely ill    HENT:   Head: Normocephalic and atraumatic.   Mouth/Throat: Oropharyngeal exudate present.   Eyes: Conjunctivae are normal. Pupils are equal, round, and reactive to light. Right eye exhibits no discharge. Left eye exhibits no discharge. Scleral icterus is present.   Neck: No JVD present. No thyromegaly present.   Cardiovascular: Regular rhythm and normal heart sounds.  Tachycardia present.  Exam reveals no gallop and no friction rub.    No murmur heard.  Pulmonary/Chest: Effort normal and breath sounds normal. No respiratory distress. She has no wheezes. She has no rales. She exhibits no tenderness.   Abdominal: Soft. Bowel sounds are normal. She exhibits no distension. There is no tenderness. There is no rebound and no guarding.   Musculoskeletal: Normal range of motion. She exhibits no edema or tenderness.   Lymphadenopathy:     She has no cervical adenopathy.   Neurological: She is alert and oriented to person, place, and time. No cranial nerve deficit.   Skin: Skin is warm and dry. She is not diaphoretic.   Psychiatric: She has a normal mood and affect. Her behavior is normal. Judgment and thought content normal.   Vitals reviewed.       Significant Labs:   CBC:   Recent Labs  Lab 03/10/17  2127 03/11/17  0615   WBC 2.00* 0.90*   HGB 7.9* 6.3*   HCT 24.8* 18.9*   PLT 20* 14*     CMP:   Recent Labs  Lab 03/10/17  2127 03/11/17  0522   * 129*   K 2.9* 3.7   CL 87* 96   CO2 23 22*    107   BUN 8 6   CREATININE 0.5 0.4*   CALCIUM 9.2 8.0*   PROT 6.1  --    ALBUMIN 1.9* 1.5*   BILITOT 0.9  --    ALKPHOS 236*  --    AST 17  --    ALT 26  --    ANIONGAP 17* 11   EGFRNONAA >60 >60       Significant Imaging: reviewed all recent imaging

## 2017-03-11 NOTE — H&P
[facility] Hospital Medicine  History & Physical    Patient Name: Kat Corley  MRN: [id number]  Admission Date: 3/10/2017  Attending Physician: Shahrzad Plasencia MD   Primary Care Provider: NAMITA Guerin         Patient information was obtained from patient, past medical records and ER records.     Subjective:     Principal Problem:Intravascular volume depletion    Chief Complaint:   Chief Complaint   Patient presents with    Weakness     X 2 days     Emesis     X 2 days         HPI: Ms. Corley is an unfortunate 45 yo  admitted to the services of hospital medicine via the ER for pancytopenia, hypomagnesemia and intractable pain 2 to metastatic breast CA.  Diagnosed in January of this year. C1 D12 of carboplatin/taxol. Has metastatic disease to liver, SQ tissue, spine and femur. Did receive one treatment of XRT for bone mets. Has pathological fx of L spine. Pt not receptive to hospice at this time. Denies any bloody stools, hemoptysis, hematuria or any other spontaneous bleeding. Denies any fever or chills.     Past Medical History:   Diagnosis Date    Cancer     Neuropathy        Past Surgical History:   Procedure Laterality Date     SECTION      TONSILLECTOMY      TUBAL LIGATION         Review of patient's allergies indicates:  No Known Allergies    No current facility-administered medications on file prior to encounter.      Current Outpatient Prescriptions on File Prior to Encounter   Medication Sig    apixaban 5 mg Tab Take 1 tablet (5 mg total) by mouth 2 (two) times daily.    dexamethasone (DECADRON) 4 MG Tab Take 2 tablets (8 mg total) by mouth every 12 (twelve) hours.    gabapentin (NEURONTIN) 300 MG capsule Take 300 mg by mouth 3 (three) times daily.    magnesium oxide (MAGOX) 400 mg tablet Take 1 tablet (400 mg total) by mouth 2 (two) times daily.    multivitamin (ONE DAILY MULTIVITAMIN) per tablet Take 1 tablet by mouth once daily.    nicotine (NICODERM CQ)  14 mg/24 hr Place 1 patch onto the skin once daily.    oxycodone (ROXICODONE) 10 mg Tab immediate release tablet Take 1 tablet (10 mg total) by mouth every 4 (four) hours as needed for Pain.    (Magic mouthwash) 1:1:1 Benadryl 12.5mg/5ml liq, aluminum & magnesium hydroxide-simehticone (Maalox), lidocaine viscous 2% Swish and spit 10 mLs every 4 (four) hours as needed (mucositis, mouth sores). Mix equal amounts of benadryl, maalox, 2% viscous lidocaine    Swish and swallow 10 ml 4 times a day    cyanocobalamin (VITAMIN B-12) 100 MCG tablet Take 100 mcg by mouth once daily.    fentaNYL (DURAGESIC) 75 mcg/hr Place 1 patch onto the skin every 72 hours.    ondansetron (ZOFRAN-ODT) 4 MG TbDL Take 1 tablet (4 mg total) by mouth every 8 (eight) hours as needed.    polyethylene glycol (GLYCOLAX) 17 gram PwPk Take 17 g by mouth once daily.    promethazine (PHENERGAN) 12.5 MG Tab Take 1 tablet (12.5 mg total) by mouth every 6 (six) hours as needed.    pyridoxine, vitamin B6, (VITAMIN B-6) 100 MG Tab Take 100 mg by mouth once daily.    senna-docusate 8.6-50 mg (PERICOLACE) 8.6-50 mg per tablet Take 1 tablet by mouth once daily.     Family History     None        Social History Main Topics    Smoking status: Current Every Day Smoker     Packs/day: 0.50     Years: 15.00     Types: Cigarettes    Smokeless tobacco: Not on file    Alcohol use No    Drug use: Not on file    Sexual activity: Yes     Review of Systems   Constitutional: Positive for activity change, appetite change, fatigue and unexpected weight change. Negative for chills, diaphoresis and fever.   HENT: Positive for mouth sores. Negative for congestion, facial swelling and nosebleeds.    Eyes: Negative for redness and itching.   Respiratory: Negative for cough, chest tightness, shortness of breath and wheezing.    Cardiovascular: Negative for chest pain, palpitations and leg swelling.   Gastrointestinal: Positive for nausea and vomiting. Negative for  abdominal pain, anal bleeding, blood in stool, constipation and diarrhea.   Endocrine: Negative for cold intolerance and heat intolerance.   Genitourinary: Positive for difficulty urinating. Negative for dysuria, flank pain, frequency, hematuria, urgency and vaginal bleeding.   Musculoskeletal: Positive for arthralgias and back pain. Negative for joint swelling, myalgias and neck pain.   Skin: Positive for pallor.   Neurological: Negative for dizziness, syncope, weakness and headaches.   Hematological: Does not bruise/bleed easily.   Psychiatric/Behavioral: Negative for agitation, confusion and hallucinations. The patient is not nervous/anxious.      Objective:     Vital Signs (Most Recent):  Temp: 98.4 °F (36.9 °C) (03/11/17 0500)  Pulse: 104 (03/11/17 0500)  Resp: (!) 28 (03/11/17 0800)  BP: 119/77 (03/11/17 0500)  SpO2: 97 % (03/11/17 0800) Vital Signs (24h Range):  Temp:  [97.2 °F (36.2 °C)-98.7 °F (37.1 °C)] 98.4 °F (36.9 °C)  Pulse:  [104-133] 104  Resp:  [16-28] 28  SpO2:  [93 %-100 %] 97 %  BP: (110-123)/(64-77) 119/77     Weight: 72.6 kg (160 lb)  Body mass index is 29.26 kg/(m^2).    Physical Exam   Constitutional: She is oriented to person, place, and time. No distress.   Appears acutely ill    HENT:   Head: Normocephalic and atraumatic.   Mouth/Throat: Oropharyngeal exudate present.   Eyes: Conjunctivae are normal. Pupils are equal, round, and reactive to light. Right eye exhibits no discharge. Left eye exhibits no discharge. Scleral icterus is present.   Neck: No JVD present. No thyromegaly present.   Cardiovascular: Regular rhythm and normal heart sounds.  Tachycardia present.  Exam reveals no gallop and no friction rub.    No murmur heard.  Pulmonary/Chest: Effort normal and breath sounds normal. No respiratory distress. She has no wheezes. She has no rales. She exhibits no tenderness.   Abdominal: Soft. Bowel sounds are normal. She exhibits no distension. There is no tenderness. There is no rebound  and no guarding.   Musculoskeletal: Normal range of motion. She exhibits no edema or tenderness.   Lymphadenopathy:     She has no cervical adenopathy.   Neurological: She is alert and oriented to person, place, and time. No cranial nerve deficit.   Skin: Skin is warm and dry. She is not diaphoretic.   Psychiatric: She has a normal mood and affect. Her behavior is normal. Judgment and thought content normal.   Vitals reviewed.       Significant Labs:   CBC:   Recent Labs  Lab 03/10/17  2127 03/11/17  0615   WBC 2.00* 0.90*   HGB 7.9* 6.3*   HCT 24.8* 18.9*   PLT 20* 14*     CMP:   Recent Labs  Lab 03/10/17  2127 03/11/17  0522   * 129*   K 2.9* 3.7   CL 87* 96   CO2 23 22*    107   BUN 8 6   CREATININE 0.5 0.4*   CALCIUM 9.2 8.0*   PROT 6.1  --    ALBUMIN 1.9* 1.5*   BILITOT 0.9  --    ALKPHOS 236*  --    AST 17  --    ALT 26  --    ANIONGAP 17* 11   EGFRNONAA >60 >60       Significant Imaging: reviewed all recent imaging    Assessment/Plan:     * Intravascular volume depletion  Aggressive hydration  Po intake poor  Associated with tachycardia  Telemetry   Will trend      Hypomagnesemia  Repleted but remains low  Will order 3 gms today  Recheck tonight      Hypokalemia  Repleted   Recheck tonight      Vertebral compression fracture  Pathological fx  See above      History of pulmonary embolism, February 2017  Hold Apixaban due to thrombocytopenia      Chemotherapy-induced neutropenia  C1 D12 of carbo/taxol, still shy of keagan counts  Neutropenia precautions  Start colony stimulating factor if ok with oncology who has been consulted  Trend counts      Drug-induced pancytopenia  Transfuse 2 units today  No bleeding with platelets at 14 K, will prepare one unit to have on hand  Recheck CBC tonight    Hyponatremia  NS  Check urine sodium   Worrisome for SIADH  Asymptomatic at this time  Will trend       Pain due to malignant neoplasm metastatic to bone  Severe, intractable pain of back and left femur  Pt  declined re-imaging to follow up on bone mets since XRT  Started fentanyl patch at home dose for pt had not had it filled  Ordered dilaudid PCA with C02 monitoring  Add steroids   Pt may benefit from additional XRT, will defer to oncology to make that decision and referral           VTE Risk Mitigation         Ordered     High Risk of VTE  Once      03/11/17 0021     Place KAREN hose  Until discontinued      03/11/17 0021     Reason for No Pharmacological VTE Prophylaxis  Once      03/11/17 0021        Amber Forrester NP  Department of Hospital Medicine   [facility]

## 2017-03-11 NOTE — CONSULTS
Consult Note    Consult Requested by: NP  Reason for Consult:  Anticoagulation in a pt with thrombocytopenia and history of PE  SUBJECTIVE:     History of Present Illness:  Patient is a 46 y.o. female presents with widespread metastatic adenocarcinoma of unknown etiology. She is receiving chemo under direction of Dr Rasmussen with last dose approx 2 weeks ago. She has severe pancytopenia. She received platelets this am and 2 units of blood have been ordered. Apixiban is on hold given her low platelet count although she had a PE found recentlyReview of patient's allergies indicates:  No Known Allergies    Past Medical History:   Diagnosis Date    Cancer     Neuropathy      Past Surgical History:   Procedure Laterality Date     SECTION      TONSILLECTOMY      TUBAL LIGATION       History reviewed. No pertinent family history.  Social History   Substance Use Topics    Smoking status: Current Every Day Smoker     Packs/day: 0.50     Years: 15.00     Types: Cigarettes    Smokeless tobacco: None    Alcohol use No       Review of Systems:  Positive for pain, weakness, SOB  CONSTITUTIONAL: No fevers, chills, night sweats  SKIN: no rashes or itching  ENT: No headaches  LYMPH NODES: None noticed   CV: No chest pain, palpitations.   RESP: +dyspnea on exertion  GI: No nausea, emesis, diarrhea  : No dysuria, hematuria, urgency, or frequency   NEURO: No seizures, memory loss      OBJECTIVE:     Vital Signs:  Temp:  [97.5 °F (36.4 °C)-98.4 °F (36.9 °C)]   Pulse:  []   Resp:  [18-30]   BP: ()/(62-77)   SpO2:  [94 %-99 %]     Physical Exam:    Gen: NAD   Psych: pleasant affect, normal thought process  Nose: Nares patent  OP clear, mucosa patent  Neck: suppple, no JVD  Lungs: CTAB, no wheezes  CV: S1S2 with RRR, No mrg  Abd: soft, NTND  Extr: No CCE  Skin: no lesions noted  Rheum: No joint swelling    Laboratory:  Lab Results   Component Value Date    WBC 0.90 (LL) 2017    HGB 6.3 (L) 2017     HCT 18.9 (LL) 03/11/2017    MCV 79 (L) 03/11/2017    PLT 14 (LL) 03/11/2017       Diagnostic Results:    noted    ASSESSMENT/PLAN:     Imaging studies reveal progression of metastatic disease  Pt would qualify for Hospice but prefers to cont therapy at this point in time  For anemia and thrombocytopenia due to chemotherapy Cont transfusions of RBCs and platelets prn  Goal is Hb greater than 7 and platelets greater than 20  Would start lovenox 1 mg per kg bid when platelets greater than 50  Consider IVC filter placement in future  For neutropenia due to chemo Start neupogen daily to help stimulate marrrow  Stop neupogen once anc greater than 1000    Pt may follow up with Dr Rasmussen once discharged  Thank you

## 2017-03-12 PROBLEM — E87.6 HYPOKALEMIA: Status: RESOLVED | Noted: 2017-01-01 | Resolved: 2017-01-01

## 2017-03-12 NOTE — PROGRESS NOTES
[facility] Hospital Medicine  Progress Note    Patient Name: Kat Corley  MRN: [id number]  Patient Class: IP- Inpatient   Admission Date: 3/10/2017  Length of Stay: 2 days  Attending Physician: Shahrzad Plasencia MD  Primary Care Provider: NAMITA Guerin    Subjective:     Principal Problem:Intravascular volume depletion    HPI:  Ms. Corley is an unfortunate 47 yo  admitted to the services of hospital medicine via the ER for pancytopenia, hypomagnesemia and intractable pain 2 to metastatic breast CA.  Diagnosed in January of this year. C1 D12 of carboplatin/taxol. Has metastatic disease to liver, SQ tissue, spine and femur. Did receive one treatment of XRT for bone mets. Has pathological fx of L spine. Pt not receptive to hospice at this time. Denies any bloody stools, hemoptysis, hematuria or any other spontaneous bleeding. Denies any fever or chills.     Hospital Course:  Patient given 1 unit platelets and 2 U PRBCs. PCA ordered for pain control. Oncology evaluated patient.     Interval History: uneventful night. Requiring PCA mainly at night.     Review of Systems   Constitutional: Negative for chills and fever.   Respiratory: Negative for shortness of breath.    Cardiovascular: Negative for chest pain.   Gastrointestinal: Negative for abdominal pain.   Genitourinary: Negative for dysuria.   Musculoskeletal: Positive for back pain.   Neurological: Negative for headaches.   Psychiatric/Behavioral: Negative for confusion.     Objective:     Vital Signs (Most Recent):  Temp: 97.4 °F (36.3 °C) (03/12/17 1200)  Pulse: 81 (03/12/17 1200)  Resp: 18 (03/12/17 1200)  BP: 112/74 (03/12/17 1200)  SpO2: 97 % (03/12/17 1200) Vital Signs (24h Range):  Temp:  [97.4 °F (36.3 °C)-98.2 °F (36.8 °C)] 97.4 °F (36.3 °C)  Pulse:  [] 81  Resp:  [16-28] 18  SpO2:  [92 %-100 %] 97 %  BP: ()/(69-80) 112/74     Weight: 72.6 kg (160 lb)  Body mass index is 29.26 kg/(m^2).    Intake/Output Summary (Last  24 hours) at 03/12/17 1301  Last data filed at 03/12/17 0600   Gross per 24 hour   Intake          5942.91 ml   Output              700 ml   Net          5242.91 ml      Physical Exam   Constitutional: She appears well-developed and well-nourished.   HENT:   Head: Normocephalic and atraumatic.   Eyes: Conjunctivae are normal.   R ptosis   Neck: Neck supple. No JVD present.   Cardiovascular: Normal rate, regular rhythm and normal heart sounds.    Abdominal: Soft. Bowel sounds are normal. She exhibits no distension. There is no tenderness.   Musculoskeletal: She exhibits no edema.   Neurological: She is alert.   Psychiatric: She has a normal mood and affect. Her behavior is normal.   Vitals reviewed.      Significant Labs: All pertinent labs within the past 24 hours have been reviewed.    Significant Imaging: none    Assessment/Plan:      * Intravascular volume depletion  Aggressive hydration  Po intake improved   Telemetry     Hypomagnesemia  Latest electrolyte panel reviewed  - Will replace and continue to monitor daily electrolytes.    Vertebral compression fracture  Pathological fx 2/2 met ca to vertebrae. PCA pump- wean as tolerated.    History of pulmonary embolism, February 2017  Hold Apixaban due to thrombocytopenia. Will resume once counts over 50.    Anemia  Drug induced. Stable after 2 U PRBCs.     Chemotherapy-induced neutropenia  Improved. Neupogen initiated.   C1 D12 of carbo/taxol, still shy of keagan counts  Trend counts    Drug-induced pancytopenia  Stable after 1 U platelets and 2 U PRBCs    Hyponatremia  Latest electrolyte panel reviewed BMP  Lab Results   Component Value Date     (L) 03/11/2017    K 3.6 03/11/2017     03/11/2017    CO2 20 (L) 03/11/2017    BUN 4 (L) 03/11/2017    CREATININE 0.4 (L) 03/11/2017    CALCIUM 8.1 (L) 03/11/2017    ANIONGAP 11 03/11/2017    ESTGFRAFRICA >60 03/11/2017    EGFRNONAA >60 03/11/2017   - Will replace and continue to monitor daily  electrolytes.    Pain due to malignant neoplasm metastatic to bone  Severe, intractable pain of back and left femur- significantly improved since PCA  Steroid taper    VTE Risk Mitigation         Ordered     High Risk of VTE  Once      03/11/17 0021     Place KAREN hose  Until discontinued      03/11/17 0021     Reason for No Pharmacological VTE Prophylaxis  Once      03/11/17 0021        Wean PCA to prn injections.     Shahrzad Plasencia MD  Department of Hospital Medicine   [facility]

## 2017-03-12 NOTE — SUBJECTIVE & OBJECTIVE
Interval History: uneventful night. Requiring PCA mainly at night.     Review of Systems   Constitutional: Negative for chills and fever.   Respiratory: Negative for shortness of breath.    Cardiovascular: Negative for chest pain.   Gastrointestinal: Negative for abdominal pain.   Genitourinary: Negative for dysuria.   Musculoskeletal: Positive for back pain.   Neurological: Negative for headaches.   Psychiatric/Behavioral: Negative for confusion.     Objective:     Vital Signs (Most Recent):  Temp: 97.4 °F (36.3 °C) (03/12/17 1200)  Pulse: 81 (03/12/17 1200)  Resp: 18 (03/12/17 1200)  BP: 112/74 (03/12/17 1200)  SpO2: 97 % (03/12/17 1200) Vital Signs (24h Range):  Temp:  [97.4 °F (36.3 °C)-98.2 °F (36.8 °C)] 97.4 °F (36.3 °C)  Pulse:  [] 81  Resp:  [16-28] 18  SpO2:  [92 %-100 %] 97 %  BP: ()/(69-80) 112/74     Weight: 72.6 kg (160 lb)  Body mass index is 29.26 kg/(m^2).    Intake/Output Summary (Last 24 hours) at 03/12/17 1301  Last data filed at 03/12/17 0600   Gross per 24 hour   Intake          5942.91 ml   Output              700 ml   Net          5242.91 ml      Physical Exam   Constitutional: She appears well-developed and well-nourished.   HENT:   Head: Normocephalic and atraumatic.   Eyes: Conjunctivae are normal.   R ptosis   Neck: Neck supple. No JVD present.   Cardiovascular: Normal rate, regular rhythm and normal heart sounds.    Abdominal: Soft. Bowel sounds are normal. She exhibits no distension. There is no tenderness.   Musculoskeletal: She exhibits no edema.   Neurological: She is alert.   Psychiatric: She has a normal mood and affect. Her behavior is normal.   Vitals reviewed.      Significant Labs: All pertinent labs within the past 24 hours have been reviewed.    Significant Imaging: none

## 2017-03-12 NOTE — PLAN OF CARE
SW verified pt info.  Pt does not currently receive services, requires assistance at home w/ dressing, bathing.       03/12/17 4118   Discharge Assessment   Assessment Type Discharge Planning Assessment   Assessment information obtained from? Patient   Type of Healthcare Directive Received (none)   Prior to hospitilization cognitive status: Alert/Oriented   Prior to hospitalization functional status: Needs Assistance   Current cognitive status: Alert/Oriented   Current Functional Status: Needs Assistance  (assitance w/ dressing, bathing, getting into/out of bed)   Arrived From home or self-care   Lives With significant other;child(konrad), adult   Able to Return to Prior Arrangements yes   Is patient able to care for self after discharge? Unable to determine at this time (comments)   Does the patient have family/friends to help with healtcare needs after discharge? yes   How many people do you have in your home that can help with your care after discharge? 1   Who are your caregiver(s) and their phone number(s)? Louie Gill [phone]   Patient's perception of discharge disposition home or selfcare   Patient currently being followed by outpatient case management? No   Patient currently receives home health services? No   Does the patient currently use HME? No   Patient currently receives private duty nursing? No   Patient currently receives any other outside agency services? No   Equipment Currently Used at Home (pt stated none)   Do you have any problems affording any of your prescribed medications? No   Is the patient taking medications as prescribed? yes   Do you have any financial concerns preventing you from receiving the healthcare you need? No   Does the patient have transportation to healthcare appointments? Yes   Transportation Available family or friend will provide   On Dialysis? No   Does the patient receive services at the [facility]? No   Are there any open cases? No   Discharge Plan A Home with  family   Discharge Plan B Home with family   Patient/Family In Agreement With Plan yes

## 2017-03-12 NOTE — PLAN OF CARE
03/12/17 1802   Readmission Questionnaire   At the time of your discharge, did someone talk to you about what your health problems were? Yes   At the time of discharge, did someone talk to you about what to watch out for regarding worsening of your health problem? Yes   At the time of discharge, did someone talk to you about what to do if you experienced worsening of your health problem? Yes   At the time of discharge, did someone talk to you about which medication to take when you left the hospital and which ones to stop taking? Yes   At the time of discharge, did someone talk to you about when and where to follow up with a doctor after you left the hospital? Yes   How often do you need to have someone help you when you read instructions, pamphlets, or other written material from your doctor or pharmacy? Rarely   Do you have problems taking your medications as prescribed? No   Do you have any problems affording any of  your prescribed medications? No   Do you have problems obtaining/receiving your medications? No   Does the patient have transportation to healthcare appointments? Yes   Lives With child(konrad), adult;significant other   Living Arrangements mobile home   Does the patient have family/friends to help with healtcare needs after discharge? yes   Who are your caregiver(s) and their phone number(s)? Louie Gill [phone]   Does your caregiver provide all the help you need? Yes   Are you currently feeling confused? No   Are you currently having problems thinking? No   Are you currently having memory problems? No   Have you felt down, depressed, or hopeless? 1  (dueto health)   Have you felt little interest or pleasure in doing things? 2  (due to health)   In the last 7 days, my sleep quality was: poor

## 2017-03-12 NOTE — PLAN OF CARE
Problem: Patient Care Overview  Goal: Plan of Care Review  Outcome: Ongoing (interventions implemented as appropriate)  Pt. Awake and alert but drowsy. Right eyelid droops. Right port in upper arm patent with NS infusing at 50cc/hr. Dilaudid PCA infusing 0.2 mg 6 min LO and 2mg/hr. Maintaining pain. Pt does c/o of pain to back. Reverse isolation in progress. Tolerating clear liquid diet. Ruiz cath patent with yellow urine noted, instructed pt. not to get out with out assistance. Pt voiced understanding. Call light in reach. O2 at 3 liters per NC cont. Pulse ox in use. Heplock to right ac. Pt. Received 2 units of blood  And had 2 bowel movements in the last 24 hours. Will cont. To monitor.safety maintained. Side rails up x2 bed in low position.

## 2017-03-12 NOTE — ASSESSMENT & PLAN NOTE
Latest electrolyte panel reviewed BMP  Lab Results   Component Value Date     (L) 03/11/2017    K 3.6 03/11/2017     03/11/2017    CO2 20 (L) 03/11/2017    BUN 4 (L) 03/11/2017    CREATININE 0.4 (L) 03/11/2017    CALCIUM 8.1 (L) 03/11/2017    ANIONGAP 11 03/11/2017    ESTGFRAFRICA >60 03/11/2017    EGFRNONAA >60 03/11/2017   - Will replace and continue to monitor daily electrolytes.

## 2017-03-12 NOTE — PLAN OF CARE
Problem: Fall Risk (Adult)  Intervention: Monitor/Assist with Self Care  Pt knows to call and has been calling for all needs. toileting offered during hourly rounding. Pt with fracisco      Problem: Pain, Chronic (Adult)  Intervention: Mutually Develop/Implement Persistent Pain Management Plan  Pt has PCA pump. Also applying warm compresses to back and neck.      Problem: Fluid Volume Deficit (Adult)  Intervention: Monitor/Manage Hypovolemia  Iv fluids infusing as ordered.

## 2017-03-13 PROBLEM — J18.9 CAP (COMMUNITY ACQUIRED PNEUMONIA): Status: ACTIVE | Noted: 2017-01-01

## 2017-03-13 PROBLEM — E86.1 INTRAVASCULAR VOLUME DEPLETION: Status: RESOLVED | Noted: 2017-01-01 | Resolved: 2017-01-01

## 2017-03-13 PROBLEM — E87.1 HYPONATREMIA: Status: RESOLVED | Noted: 2017-01-01 | Resolved: 2017-01-01

## 2017-03-13 NOTE — PROGRESS NOTES
PIV removed per MD order for discharge. Intact and no redness or edema noted to site.   Written and verbal discharge instructions given to patient. Patient verbalized understanding of all instructions.  Patient awaiting significant other for oxygen tank and  ride home.  Will continue to monitor.

## 2017-03-13 NOTE — DISCHARGE SUMMARY
[facility] Medicine  Discharge Summary      Patient Name: Kat Corley  MRN: [id number]  Admission Date: 3/10/2017  Hospital Length of Stay: 3 days  Discharge Date and Time:  03/13/2017 10:22 AM  Attending Physician: No att. providers found   Discharging Provider: Shahrzad Plasencia MD  Primary Care Provider: NAMITA Guerin      HPI:   Ms. Corley is an unfortunate 45 yo  admitted to the services of hospital medicine via the ER for pancytopenia, hypomagnesemia and intractable pain 2 to metastatic breast CA.  Diagnosed in January of this year. C1 D12 of carboplatin/taxol. Has metastatic disease to liver, SQ tissue, spine and femur. Did receive one treatment of XRT for bone mets. Has pathological fx of L spine. Pt not receptive to hospice at this time. Denies any bloody stools, hemoptysis, hematuria or any other spontaneous bleeding. Denies any fever or chills.         Indwelling Lines/Drains at time of discharge:   Lines/Drains/Airways     Central Venous Catheter Line                 Port A Cath Single Lumen other (see comments) -- days         Port A Cath Single Lumen 02/15/17 1400 other (see comments) 25 days          Drain                 Urethral Catheter 03/11/17 1808 16 Fr. 1 day              Hospital Course:   Patient given 1 unit platelets and 2 U PRBCs. PCA ordered for pain control. Oncology evaluated patient. Pt no longer required PCA for pain. Transitioned back to her oral narcotics with good results. Her blood counts significantly improved. CXR revealed possible PNA. She will go home with oral course of antibiotics. Patient examined at bedside on day of discharge. Exam findings within normal limits. She was deemed safe for discharge.       Consults:   Consults         Status Ordering Provider     Inpatient consult to Oncology  Once     Provider:  Herlinda Arriola MD    Completed STEPHANIE PAN consult to case management  Once     Provider:  (Not yet assigned)     Completed STEPHANIE PAN     IP consult to dietary  Once     Provider:  (Not yet assigned)    Completed STEPHANIE PAN          Significant Diagnostic Studies: Radiology: CT scan: CT ABDOMEN PELVIS WITH CONTRAST:   Results for orders placed or performed during the hospital encounter of 03/10/17   CT Abdomen Pelvis With Contrast    Narrative    Comparison: CT of the abdomen-1/8/2017.  CT of the lumbar spine-2/13/2017.    Technique: Following the intravenous administration of 75 cc of Omnipaque 350 contrast material, 5-mm contiguous axial images were acquired through the abdomen and pelvis.    Findings:    The tip of a central venous catheter is visible near the union of the SVC and right atrium.  There are coronary artery calcifications present.  No pericardial fluid.  The distal esophagus appears dilated.  There is left basilar airspace consolidative change present with associated air bronchograms.  There is a mosaic lung pattern throughout the visualized left and right chest suggesting either small airways disease or pulmonary edema.  There is linear atelectasis versus pneumonitis present within the left lingula and at the right lung base.  There are multiple noncalcified pulmonary nodular opacities visible within the right chest.  One nodule measures 4 mm in the right lower lobe (series 2 image 5).  Another noncalcified nodule measures 4.5 mm in the right lower lobe and shows possible central cavitation no (series 2 image 6).  No significant volume of pleural fluid.  There are multiple soft tissue nodules present over the soft tissues of the chest wall, most notably in the anterior chest wall (series 2 image 13-16, and adjacent to the right scapula (series 2 image 1) which are highly concerning for soft tissue metastases.  These appear more pronounced in size and number than at the time of the patient's prior CT scan.    There is pneumobilia present.  There is a biliary stent present within the common bile  duct which extends into the 2nd portion of the duodenum and which demonstrates an associated air-fluid level distally.  There are multiple small ill-defined hypodensities present within the liver which are concerning for hepatic metastatic disease.  The largest visualized hypodensity measures approximately 10 mm (series 2 image 23) within the lateral segment of the left hepatic lobe.  A previously measured soft tissue nodule within the midline ventral abdominal wall and anteriorly measures 12 mm (series 2 image 30), similar to the prior exam.  A previously noted soft tissue nodule at the lower inner aspect of the left breast measures 16 mm on today's examination (series 2 image 19), larger than at the time of the prior study.  The portal vein is patent.  The spleen is unremarkable.  The duodenal C-loop is unremarkable.  There is a 3.2 cm soft tissue density observed in the vicinity of the pancreatic head.  A pancreatic head mass cannot be entirely excluded.  This are enlarged and heterogeneous measuring approximately 5.3 x 2.4 cm in maximal dimension on the right and 4.5 x 3.3 cm in maximal dimension on the left.  These findings are compatible with adrenal metastasis which have increased in size when compared with the prior examination.  There is an enlarged portacaval lymph node which measures 14 mm in short axis (series 2 image 32), larger than at the time of the prior study.  The abdomin  al aorta is non-aneurysmal.  There is atherosclerotic calcification present within the abdominal aorta and iliac vasculature.  No pathologically enlarged retroperitoneal lymph nodes.    There is a large volume of stool in the left colon and rectum.  Please correlate for symptoms of constipation.  There is mucosal thickening and enhancement over a relatively long segment of the descending colon and transverse colon which could relate to infectious colitis.  Ischemic colitis is not entirely excluded but is considered less likely  given the patient's young age.  Please correlate clinically.  There are several scattered noncalcified nodular densities within the ventral peritoneum along the greater omentum (series 2 image 38-40 and along the anterior margin of the descending colon (series 2 image 48-54) which are most likely related to peritoneal metastatic disease, new when compared with the previous study.  The kidneys show no definite abnormalities.    The urinary bladder is unremarkable.  The uterus is unremarkable.  No inguinal lymphadenopathy or angle hernia.  There are multiple soft tissue nodules scattered over the subcutaneous soft tissues of the lumbar region and buttocks.    There is diffuse osseous metastatic disease visible, more pronounced than at the time of the prior examination.  There is a central L2 compression fracture and central T9 compression fracture.  The L2 compression fracture shows mild retropulsion at its posterior inferior aspect resulting in severe central canal stenosis (series 2 image 37), similar to the prior CT of the lumbar spine.  Tumor extension into the ventral epidural space is suspected.    Impression    1.  Imaging findings which are most compatible with progression of metastatic disease within the visualized chest, abdomen, pelvis, and axial skeleton.    2.  Apparent bowel wall thickening and mucosal enhancement involving the ascending colon and transverse colon which could relate to infectious colitis.    3.  Diffuse osseous metastatic disease with central compression fractures noted at T9 and L2.  There is very likely epidural tumor burden noted at L2, similar to the prior CT of the lumbar spine performed 2/13/2017.    4.  Mosaic lung pattern at the lung bases and patchy areas of segmental airspace opacity in the lower lung zones with differential considerations provided above.        Electronically signed by: Rich Fernandes MD  Date:     03/11/17  Time:    08:15        Pending Diagnostic Studies:      None        Final Active Diagnoses:    Diagnosis Date Noted POA    CAP (community acquired pneumonia) [J18.9] 03/13/2017 Yes    Chemotherapy-induced neutropenia [D70.1] 03/11/2017 Yes    Drug-induced pancytopenia [D61.811] 03/11/2017 Yes    Pain due to malignant neoplasm metastatic to bone [G89.3, C79.51] 03/11/2017 Yes    History of pulmonary embolism, February 2017 [Z86.711] 02/15/2017 Yes    Anemia [D64.9] 02/15/2017 Yes    Vertebral compression fracture [M48.50XA] 02/13/2017 Yes    Hypomagnesemia [E83.42] 01/10/2017 Yes      Problems Resolved During this Admission:    Diagnosis Date Noted Date Resolved POA    PRINCIPAL PROBLEM:  Intravascular volume depletion [E86.1] 03/10/2017 03/13/2017 Yes    Hyponatremia [E87.1] 03/11/2017 03/13/2017 Yes    Hypokalemia [E87.6] 02/07/2017 03/12/2017 Yes      Hypomagnesemia  Chronic. Resume routine home oral replacement.     Vertebral compression fracture  Pathological fx 2/2 met ca to vertebrae. Resume oral narcotics prn. Follow up with pcp.     History of pulmonary embolism, February 2017  Hold Apixaban due to thrombocytopenia. Will resume once counts over 50.    Anemia  Drug induced. Stable after 2 U PRBCs. Follow up with oncology.    Chemotherapy-induced neutropenia  Improved. Treated with neupogen. Follow up with oncology.     Drug-induced pancytopenia  Stable after 1 U platelets and 2 U PRBCs    CAP (community acquired pneumonia)  CXR looks worse when compared to admission imaging. Pt clinically asymptomatic. Will send home with course of oral Abx.       * Intravascular volume depletion, resolved as of 3/13/2017  Aggressive hydration  Po intake improved   Telemetry     Hyponatremia, resolved as of 3/13/2017  Latest electrolyte panel reviewed BMP  Lab Results   Component Value Date     03/13/2017    K 3.2 (L) 03/13/2017     03/13/2017    CO2 23 03/13/2017    BUN 4 (L) 03/13/2017    CREATININE 0.4 (L) 03/13/2017    CALCIUM 7.9 (L) 03/13/2017     "ANIONGAP 8 03/13/2017    ESTGFRAFRICA >60 03/13/2017    EGFRNONAA >60 03/13/2017   - Will replace and continue to monitor daily electrolytes.      Discharged Condition: stable    Disposition: Home or Self Care    Follow Up:  Follow-up Information     Follow up with NAMITA Guerin In 1 week.    Specialty:  Family Medicine    Why:  hosptial follow up on 3-20-17 @ 11 a.m.    Contact information:    [address] [place] [address]  [phone]          Follow up with [facility].    Specialty:  DME Provider    Why:  DME-rolling walker, 3-1 commode    Contact information:    [address]  [facility] [address]  [phone]          Follow up with Racquel Rome MD On 3/29/2017.    Specialty:  Hematology and Oncology    Why:  appointment on 3-29-17 @ 2 p.m.    Contact information:    [address] [place] [address]  [phone]          Patient Instructions:     WALKER FOR HOME USE   Order Specific Question Answer Comments   Type of Walker: Adult (5'4"-6'6")    With wheels? Yes    Height: 5' 2" (1.575 m)    Weight: 72.6 kg (160 lb)    Length of need (1-99 months): 99    Please check all that apply: Patient's condition impairs ambulation.    Please check all that apply: Patient needs help to get in and out of chair.    Please check all that apply: Patient is unable to safely ambulate without equipment.      3 IN 1 COMMODE FOR HOME USE   Order Specific Question Answer Comments   Type: Standard    Height: 5' 2" (1.575 m)    Weight: 72.6 kg (160 lb)    Length of need (1-99 months): 99      Referral to Home health   Referral Priority: Routine Referral Type: Home Health   Referral Reason: Specialty Services Required    Requested Specialty: Home Health Services    Number of Visits Requested: 1      Diet general     Activity as tolerated       Medications:  Reconciled Home Medications:   Discharge Medication List as of 3/13/2017  2:03 PM      START taking these medications    Details "   amoxicillin-clavulanate 875-125mg (AUGMENTIN) 875-125 mg per tablet Take 1 tablet by mouth 2 (two) times daily., Starting 3/13/2017, Until Mon 3/20/17, Normal      levoFLOXacin (LEVAQUIN) 750 MG tablet Take 1 tablet (750 mg total) by mouth once daily., Starting 3/13/2017, Until Sat 3/18/17, Normal      predniSONE (DELTASONE) 20 MG tablet Take 3 pills daily for 3 days, then 2 pills daily for 3 days, then 1 pill daily for 3 days, then 1/2 pill daily for 4 days., Normal         CONTINUE these medications which have NOT CHANGED    Details   gabapentin (NEURONTIN) 300 MG capsule Take 300 mg by mouth 3 (three) times daily., Until Discontinued, Historical Med      magnesium oxide (MAGOX) 400 mg tablet Take 1 tablet (400 mg total) by mouth 2 (two) times daily., Starting 2/27/2017, Until Tue 2/27/18, Normal      multivitamin (ONE DAILY MULTIVITAMIN) per tablet Take 1 tablet by mouth once daily., Until Discontinued, Historical Med      nicotine (NICODERM CQ) 14 mg/24 hr Place 1 patch onto the skin once daily., Starting 1/13/2017, Until Discontinued, OTC      oxycodone (ROXICODONE) 10 mg Tab immediate release tablet Take 1 tablet (10 mg total) by mouth every 4 (four) hours as needed for Pain., Starting 2/19/2017, Until Discontinued, Print      (Magic mouthwash) 1:1:1 Benadryl 12.5mg/5ml liq, aluminum & magnesium hydroxide-simehticone (Maalox), lidocaine viscous 2% Swish and spit 10 mLs every 4 (four) hours as needed (mucositis, mouth sores). Mix equal amounts of benadryl, maalox, 2% viscous lidocaine    Swish and swallow 10 ml 4 times a day, Starting 2/27/2017, Until Discontinued, Print      cyanocobalamin (VITAMIN B-12) 100 MCG tablet Take 100 mcg by mouth once daily., Until Discontinued, Historical Med      fentaNYL (DURAGESIC) 75 mcg/hr Place 1 patch onto the skin every 72 hours., Starting 2/27/2017, Until Discontinued, Print      ondansetron (ZOFRAN-ODT) 4 MG TbDL Take 1 tablet (4 mg total) by mouth every 8 (eight) hours  as needed., Starting 2/27/2017, Until Discontinued, Normal      polyethylene glycol (GLYCOLAX) 17 gram PwPk Take 17 g by mouth once daily., Starting 1/13/2017, Until Discontinued, Normal      promethazine (PHENERGAN) 12.5 MG Tab Take 1 tablet (12.5 mg total) by mouth every 6 (six) hours as needed., Starting 2/27/2017, Until Discontinued, Normal      pyridoxine, vitamin B6, (VITAMIN B-6) 100 MG Tab Take 100 mg by mouth once daily., Until Discontinued, Historical Med      senna-docusate 8.6-50 mg (PERICOLACE) 8.6-50 mg per tablet Take 1 tablet by mouth once daily., Starting 1/13/2017, Until Discontinued, OTC         STOP taking these medications       apixaban 5 mg Tab Comments:   Reason for Stopping:         dexamethasone (DECADRON) 4 MG Tab Comments:   Reason for Stopping:             Time spent on the discharge of patient: 40 minutes    Has appt with oncology this afternoon.     Shahrzad Plasencia MD  Department of Hospital Medicine  [facility]

## 2017-03-13 NOTE — PROGRESS NOTES
Met with pt to discuss her discharge.  Pt did say that she recently got home oxygen from [facility] and verified that it was at home.  Pt stated that she received a text on her phone last week regarding an appointment she had this afternoon however she does not know who it is with or where it is.  Pt stated that her phone is not charged so she can't retrieve the information and no one is at home to assist her.  Her sig other Black is at work and she does not know her daughters phone number.  She said that Black had her daughters' number.  Obtained signature for the disclosure form for home health and DME.   Attempted to speak to pt's sig other Black, [phone].   Left a message on his voice mail requesting that he call pt and me regarding pt' discharge today.  Spoke with Dr. Plasencia who stated pt's appointment was in pt's chart and pt is scheduled to see Dr. MARINA Rome with oncology at 2:20 p.m. Today.    11:10 a.m. - spoke to Richardson at [facility] for Medicaid who stated that they had a 3 hours window when they could  and could not guarantee that they would be able to get pt to her appointment in time.  Discussed with DIANA Ayala director option of cab for pt to make it to her appointment.     11:25 p.m. - Lucy verified with pt that her ride would not pick her up until 6 p.m.  Pt unable to stay in the doctors office until then.  Plan is to reschedule pt's appointment with Dr. Rome.  Updated Dr. Plasencia.      11:35 p.m - Called to cancel pt's appointment with Dr. Rome.  Attempted to reschedule the appointment.  Plan for Dr. Rome's nurse to call me with the appointment time as pt's phone is not charged.

## 2017-03-13 NOTE — PLAN OF CARE
Problem: Patient Care Overview  Goal: Plan of Care Review  Outcome: Ongoing (interventions implemented as appropriate)  Pt with complaints of neck/L arm pain. 1x dose dilaudid given. Pt on PCA pump at this time. ETCO2 in place. Fentanyl patch noted to L arm.  Heat applied to neck. Tele in place-sinus tach/sinus rhythm. NS infusing @ 150mls/hr per MD order. Ruiz in place- draining yellow urine output. O2 3L per NC. HOB 30 degrees. Pt with good appetite-tolerating well. Safety maintained, hourly rounding performed. Pt able to call and voice needs/wants. Bed in lowest position, wheels locked, SR upx2, call light in easy reach. Will continue to monitor.

## 2017-03-13 NOTE — PROGRESS NOTES
Per Abran with [facility], ok to pull equipment from DME closet.  Patient signed delivery ticket.  [facility] informed patient's nurse of delivery of equipment.  [facility] placed completed paperwork in tray in DME closet.  SSC sent patient information to [facility] through [facility] system for processing and acceptance.ZANA Locke    12:30pm Per Chelo [phone] with [facility], they are reviewing patient information and will return call.ZANA Locke

## 2017-03-13 NOTE — ASSESSMENT & PLAN NOTE
Latest electrolyte panel reviewed BMP  Lab Results   Component Value Date     03/13/2017    K 3.2 (L) 03/13/2017     03/13/2017    CO2 23 03/13/2017    BUN 4 (L) 03/13/2017    CREATININE 0.4 (L) 03/13/2017    CALCIUM 7.9 (L) 03/13/2017    ANIONGAP 8 03/13/2017    ESTGFRAFRICA >60 03/13/2017    EGFRNONAA >60 03/13/2017   - Will replace and continue to monitor daily electrolytes.

## 2017-03-13 NOTE — PROGRESS NOTES
Cecile at Dr. Rome's scheduled pt's appointment for 3-29-17 @ 2 p.m. Updated the AVS and pt's nurse.

## 2017-03-13 NOTE — PROGRESS NOTES
S.O. Arrived with O2 tank.  Patient left unit via Wheelchair escorted by nurse.  No complications.

## 2017-03-13 NOTE — PLAN OF CARE
03/13/17 1400   PRE-TX-O2-ETCO2   O2 Device (Oxygen Therapy) nasal cannula   $ Is the patient on Oxygen? Yes   Flow (L/min) 3   SpO2 (!) 94 %   $ Pulse Oximetry - Multiple Charge Pulse Oximetry - Multiple

## 2017-03-13 NOTE — PLAN OF CARE
03/13/17 1735   Final Note   Assessment Type Final Discharge Note   Discharge Disposition Home-Health   Discharge planning education complete? Yes

## 2017-03-13 NOTE — DISCHARGE INSTRUCTIONS
Thank you for choosing [facility] for your medical care. The primary doctor who is taking care of you at the time of your discharge is Shahrzad Plasencia MD.     You were admitted to the hospital with Intravascular volume depletion.     Please note your discharge instructions, including diet/activity restrictions, follow-up appointments, and medication changes.  If you have any questions about your medical issues, prescriptions, or any other questions, please feel free to contact the [facility] Medicine Dept at [phone] and we will help.    If you are previously with Home health, outpatient PT/OT or under a therapy program, you are cleared to return to those programs.    Please direct all long term medication refills and follow up to your primary care provider, NAMITA Guerin. Thank you again for letting us take care of your health care needs.    Please note the following discharge instructions per your discharging physician-  Take all meds as prescribed.

## 2017-03-13 NOTE — NURSING
Pt crying complaining 10/10pain to neck and L arm even with PCA pump, encouraged pt to use PCA pump (according to stats, pt is not using much). Informed TRINITY Clark. New orders placed.

## 2017-03-13 NOTE — ASSESSMENT & PLAN NOTE
CXR looks worse when compared to admission imaging. Pt clinically asymptomatic. Will send home with course of oral Abx.

## 2017-03-13 NOTE — PROGRESS NOTES
03/13/17 0645   Patient Assessment/Suction   Level of Consciousness (AVPU) alert   PRE-TX-O2-ETCO2   O2 Device (Oxygen Therapy) nasal cannula   $ Is the patient on Oxygen? Yes   Flow (L/min) 2   SpO2 98 %   Pulse Oximetry Type Intermittent   $ Pulse Oximetry - Multiple Charge Pulse Oximetry - Multiple

## 2017-03-13 NOTE — TELEPHONE ENCOUNTER
----- Message from Mary Panchal sent at 3/13/2017 11:30 AM CDT -----  Contact: Princess with [facility]  Princess with [facility] states patient will not be at appointment due to does not have transportation until after 6:00 to pick her up from the hospital so she needs to reschedule today's appointment. Please call Princess today at [phone]. Thanks! Call placed to pod.

## 2017-03-13 NOTE — PROGRESS NOTES
Notified by Jihan in lab of critical value (platelets 35).  Dr. Plasencia notified.  No new orders at this time.

## 2017-03-13 NOTE — NURSING
Pt with bilateral crackles, abdominal muscles use with breathing. Vitals stable. No morning labs done. TRINITY Clark notified. CXR ordered, and labs. Will continue to monitor and notify day nurse.

## 2017-03-13 NOTE — TELEPHONE ENCOUNTER
----- Message from Kelvin Zurita sent at 3/13/2017  2:29 PM CDT -----  Contact: [facility] hospital nurse-Princess--[phone]  Patient needs to reschedule appointment, please call the nurse to [phone] Thanks!

## 2017-03-17 NOTE — PROGRESS NOTES
The referral for the patient in [facility], room 405, bed 405 A admitted at 3/10/2017 8:42 PM has been updated by [email or web address].  Update: Accepted.

## 2017-03-20 NOTE — TELEPHONE ENCOUNTER
----- Message from Cherise Nino sent at 3/20/2017 11:31 AM CDT -----  Contact:   [phone]  Calling to  See if  Patient can  Get  An  [place] // pt  Saw a  Doctor  In  [facility] [facility] ,  With  Medicaid // pt  Wants to  tranfer  Over  Here // please call

## 2017-03-22 PROBLEM — J96.00 ACUTE RESPIRATORY FAILURE: Status: ACTIVE | Noted: 2017-01-01

## 2017-03-23 PROBLEM — J96.21 ACUTE ON CHRONIC RESPIRATORY FAILURE WITH HYPOXIA AND HYPERCAPNIA: Status: ACTIVE | Noted: 2017-01-01

## 2017-03-23 PROBLEM — J96.22 ACUTE ON CHRONIC RESPIRATORY FAILURE WITH HYPOXIA AND HYPERCAPNIA: Status: ACTIVE | Noted: 2017-01-01

## 2017-03-23 PROBLEM — C78.00 LYMPHANGITIC LUNG METASTASIS: Status: ACTIVE | Noted: 2017-01-01

## 2017-03-23 PROBLEM — J98.11 ATELECTASIS OF LEFT LUNG: Status: ACTIVE | Noted: 2017-01-01

## 2017-03-23 PROBLEM — I24.89 DEMAND ISCHEMIA: Status: ACTIVE | Noted: 2017-01-01

## 2017-03-23 NOTE — PLAN OF CARE
Problem: Patient Care Overview  Goal: Plan of Care Review  Outcome: Ongoing (interventions implemented as appropriate)  Care plan reviewed with patient. Patient wants her pain controlled.

## 2017-03-23 NOTE — CONSULTS
Consult Note    Consult Requested by:  Dr Stratton  Reason for Consult:  Lymphangiitic carcinomatosis and respiratory failure  SUBJECTIVE:     History of Present Illness:  Patient is a 46 y.o. female presents with progressing SOB. SHe see Dr Rasmussen for extensive metastatic breast cancer and has been receiving chemo with carbo/taxane plus zometa    Pt has had 5 ER visits since  for progressing debilitation. She presents with recurring anemia and thrombocytopenia. CTA revealed multilobar pneumonia and pulmonary edema worrisome for lymphangiitic spread of her malignancy. Last chemo was around     SHe is in the ICU tolerating antimicrobials  Review of patient's allergies indicates:  No Known Allergies    Past Medical History:   Diagnosis Date    Anticoagulant long-term use     Cancer     COPD (chronic obstructive pulmonary disease)     Neuropathy     Pneumonia      Past Surgical History:   Procedure Laterality Date     SECTION      TONSILLECTOMY      TUBAL LIGATION       History reviewed. No pertinent family history.  Social History   Substance Use Topics    Smoking status: Current Every Day Smoker     Packs/day: 0.50     Years: 15.00     Types: Cigarettes    Smokeless tobacco: None    Alcohol use No       Review of Systems:    CONSTITUTIONAL: No fevers, chills, night sweats,  + wt. loss,  + appetite changes  SKIN: no rashes or itching  ENT: No headaches, head trauma, vision changes, or eye pain  CV: No chest pain, palpitations.   RESP: + SOB and weakness  GI: No nausea, emesis, diarrhea, constipation  : No dysuria, hematuria, urgency, or frequency   HEME: No easy bruising, bleeding problems  PSYCHIATRIC: No depression, anxiety, psychosis, hallucinations.  MSK: No arthralgias or joint swelling    OBJECTIVE:     Vital Signs:  Temp:  [97.6 °F (36.4 °C)-97.8 °F (36.6 °C)]   Pulse:  []   Resp:  [23-24]   BP: (101-140)/(62-78)   SpO2:  [87 %-100 %]     Physical Exam:  Gen: appears  ill     Neck: suppple, no JVD  Lungs: decreased breath sounds bilateral bases, poor inspiratory effort  CV: S1S2 with RRR  Abd: soft, NTND, + BS  Extr: No CCE      Laboratory:  Lab Results   Component Value Date    WBC 4.30 03/23/2017    HGB 8.6 (L) 03/23/2017    HCT 26.4 (L) 03/23/2017    MCV 83 03/23/2017    PLT 44 (L) 03/23/2017     CMP  Sodium   Date Value Ref Range Status   03/23/2017 134 (L) 136 - 145 mmol/L Final     Potassium   Date Value Ref Range Status   03/23/2017 3.1 (L) 3.5 - 5.1 mmol/L Final     Chloride   Date Value Ref Range Status   03/23/2017 92 (L) 95 - 110 mmol/L Final     CO2   Date Value Ref Range Status   03/23/2017 32 (H) 23 - 29 mmol/L Final     Glucose   Date Value Ref Range Status   03/23/2017 230 (H) 70 - 110 mg/dL Final     BUN, Bld   Date Value Ref Range Status   03/23/2017 5 (L) 6 - 20 mg/dL Final     Creatinine   Date Value Ref Range Status   03/23/2017 0.5 0.5 - 1.4 mg/dL Final     Calcium   Date Value Ref Range Status   03/23/2017 8.4 (L) 8.7 - 10.5 mg/dL Final     Total Protein   Date Value Ref Range Status   03/23/2017 4.6 (L) 6.0 - 8.4 g/dL Final     Albumin   Date Value Ref Range Status   03/23/2017 1.5 (L) 3.5 - 5.2 g/dL Final     Total Bilirubin   Date Value Ref Range Status   03/23/2017 0.5 0.1 - 1.0 mg/dL Final     Comment:     For infants and newborns, interpretation of results should be based  on gestational age, weight and in agreement with clinical  observations.  Premature Infant recommended reference ranges:  Up to 24 hours.............<8.0 mg/dL  Up to 48 hours............<12.0 mg/dL  3-5 days..................<15.0 mg/dL  6-29 days.................<15.0 mg/dL       Alkaline Phosphatase   Date Value Ref Range Status   03/23/2017 180 (H) 55 - 135 U/L Final     AST   Date Value Ref Range Status   03/23/2017 20 10 - 40 U/L Final     ALT   Date Value Ref Range Status   03/23/2017 21 10 - 44 U/L Final     Anion Gap   Date Value Ref Range Status   03/23/2017 10 8 - 16  mmol/L Final     eGFR if    Date Value Ref Range Status   03/23/2017 >60 >60 mL/min/1.73 m^2 Final     eGFR if non    Date Value Ref Range Status   03/23/2017 >60 >60 mL/min/1.73 m^2 Final     Comment:     Calculation used to obtain the estimated glomerular filtration  rate (eGFR) is the CKD-EPI equation. Since race is unknown   in our information system, the eGFR values for   -American and Non--American patients are given   for each creatinine result.           Diagnostic Results:  CTA results reviewed    ASSESSMENT/PLAN:   Metastatic carcinoma with breast primary  Pt with severe bone marrow suppression after each cycle of chemo  Explained to pt that she either needs a different regimen or a dose reduction  Her ECOG is a 3   In my opinion she qualifies for Hospice care  I did have this discussion with her and she is willing to discuss with her oncologist  Unfortunately this patient has progression on scans and should be made comfortable  Cont to support her hematologically with transfusions to keep Hb greater than 7   Transfuse platelets if her counts drop below 40    She may follow up with Dr Rasmussen as an outpatient  Thank you

## 2017-03-23 NOTE — CONSULTS
Kat Corley [id number] is a 46 y.o. female who has been consulted for vancomycin dosing.    The patient has the following labs:     Date Creatinine (mg/dl)    BUN WBC Count   3/23/2017 Estimated Creatinine Clearance: 160.1 mL/min (based on Cr of 0.4). Lab Results   Component Value Date    BUN 6 03/22/2017     Lab Results   Component Value Date    WBC 8.70 03/22/2017        Current weight is 66.2 kg (145 lb 15.1 oz)      The patient received  1250 mg on 3.23 at 2249 in ED    The patient will be started on vancomycin at a dose of 1000 mg every 8 hours (15mg/kg/dose).  The vancomycin trough has been ordered for 3/24 at 0230.      Patient will be followed by pharmacy for changes in renal function, toxicity, and efficacy.   Thank you for allowing us to participate in this patient's care.     Estefania Medina

## 2017-03-23 NOTE — H&P (VIEW-ONLY)
[facility] Hospital Medicine  History & Physical    Patient Name: Kat Corley  MRN: [id number]  Admission Date: 3/10/2017  Attending Physician: Shahrzad Plasencia MD   Primary Care Provider: NAMITA Guerin         Patient information was obtained from patient, past medical records and ER records.     Subjective:     Principal Problem:Intravascular volume depletion    Chief Complaint:   Chief Complaint   Patient presents with    Weakness     X 2 days     Emesis     X 2 days         HPI: Ms. Corley is an unfortunate 45 yo  admitted to the services of hospital medicine via the ER for pancytopenia, hypomagnesemia and intractable pain 2 to metastatic breast CA.  Diagnosed in January of this year. C1 D12 of carboplatin/taxol. Has metastatic disease to liver, SQ tissue, spine and femur. Did receive one treatment of XRT for bone mets. Has pathological fx of L spine. Pt not receptive to hospice at this time. Denies any bloody stools, hemoptysis, hematuria or any other spontaneous bleeding. Denies any fever or chills.     Past Medical History:   Diagnosis Date    Cancer     Neuropathy        Past Surgical History:   Procedure Laterality Date     SECTION      TONSILLECTOMY      TUBAL LIGATION         Review of patient's allergies indicates:  No Known Allergies    No current facility-administered medications on file prior to encounter.      Current Outpatient Prescriptions on File Prior to Encounter   Medication Sig    apixaban 5 mg Tab Take 1 tablet (5 mg total) by mouth 2 (two) times daily.    dexamethasone (DECADRON) 4 MG Tab Take 2 tablets (8 mg total) by mouth every 12 (twelve) hours.    gabapentin (NEURONTIN) 300 MG capsule Take 300 mg by mouth 3 (three) times daily.    magnesium oxide (MAGOX) 400 mg tablet Take 1 tablet (400 mg total) by mouth 2 (two) times daily.    multivitamin (ONE DAILY MULTIVITAMIN) per tablet Take 1 tablet by mouth once daily.    nicotine (NICODERM CQ)  14 mg/24 hr Place 1 patch onto the skin once daily.    oxycodone (ROXICODONE) 10 mg Tab immediate release tablet Take 1 tablet (10 mg total) by mouth every 4 (four) hours as needed for Pain.    (Magic mouthwash) 1:1:1 Benadryl 12.5mg/5ml liq, aluminum & magnesium hydroxide-simehticone (Maalox), lidocaine viscous 2% Swish and spit 10 mLs every 4 (four) hours as needed (mucositis, mouth sores). Mix equal amounts of benadryl, maalox, 2% viscous lidocaine    Swish and swallow 10 ml 4 times a day    cyanocobalamin (VITAMIN B-12) 100 MCG tablet Take 100 mcg by mouth once daily.    fentaNYL (DURAGESIC) 75 mcg/hr Place 1 patch onto the skin every 72 hours.    ondansetron (ZOFRAN-ODT) 4 MG TbDL Take 1 tablet (4 mg total) by mouth every 8 (eight) hours as needed.    polyethylene glycol (GLYCOLAX) 17 gram PwPk Take 17 g by mouth once daily.    promethazine (PHENERGAN) 12.5 MG Tab Take 1 tablet (12.5 mg total) by mouth every 6 (six) hours as needed.    pyridoxine, vitamin B6, (VITAMIN B-6) 100 MG Tab Take 100 mg by mouth once daily.    senna-docusate 8.6-50 mg (PERICOLACE) 8.6-50 mg per tablet Take 1 tablet by mouth once daily.     Family History     None        Social History Main Topics    Smoking status: Current Every Day Smoker     Packs/day: 0.50     Years: 15.00     Types: Cigarettes    Smokeless tobacco: Not on file    Alcohol use No    Drug use: Not on file    Sexual activity: Yes     Review of Systems   Constitutional: Positive for activity change, appetite change, fatigue and unexpected weight change. Negative for chills, diaphoresis and fever.   HENT: Positive for mouth sores. Negative for congestion, facial swelling and nosebleeds.    Eyes: Negative for redness and itching.   Respiratory: Negative for cough, chest tightness, shortness of breath and wheezing.    Cardiovascular: Negative for chest pain, palpitations and leg swelling.   Gastrointestinal: Positive for nausea and vomiting. Negative for  abdominal pain, anal bleeding, blood in stool, constipation and diarrhea.   Endocrine: Negative for cold intolerance and heat intolerance.   Genitourinary: Positive for difficulty urinating. Negative for dysuria, flank pain, frequency, hematuria, urgency and vaginal bleeding.   Musculoskeletal: Positive for arthralgias and back pain. Negative for joint swelling, myalgias and neck pain.   Skin: Positive for pallor.   Neurological: Negative for dizziness, syncope, weakness and headaches.   Hematological: Does not bruise/bleed easily.   Psychiatric/Behavioral: Negative for agitation, confusion and hallucinations. The patient is not nervous/anxious.      Objective:     Vital Signs (Most Recent):  Temp: 98.4 °F (36.9 °C) (03/11/17 0500)  Pulse: 104 (03/11/17 0500)  Resp: (!) 28 (03/11/17 0800)  BP: 119/77 (03/11/17 0500)  SpO2: 97 % (03/11/17 0800) Vital Signs (24h Range):  Temp:  [97.2 °F (36.2 °C)-98.7 °F (37.1 °C)] 98.4 °F (36.9 °C)  Pulse:  [104-133] 104  Resp:  [16-28] 28  SpO2:  [93 %-100 %] 97 %  BP: (110-123)/(64-77) 119/77     Weight: 72.6 kg (160 lb)  Body mass index is 29.26 kg/(m^2).    Physical Exam   Constitutional: She is oriented to person, place, and time. No distress.   Appears acutely ill    HENT:   Head: Normocephalic and atraumatic.   Mouth/Throat: Oropharyngeal exudate present.   Eyes: Conjunctivae are normal. Pupils are equal, round, and reactive to light. Right eye exhibits no discharge. Left eye exhibits no discharge. Scleral icterus is present.   Neck: No JVD present. No thyromegaly present.   Cardiovascular: Regular rhythm and normal heart sounds.  Tachycardia present.  Exam reveals no gallop and no friction rub.    No murmur heard.  Pulmonary/Chest: Effort normal and breath sounds normal. No respiratory distress. She has no wheezes. She has no rales. She exhibits no tenderness.   Abdominal: Soft. Bowel sounds are normal. She exhibits no distension. There is no tenderness. There is no rebound  and no guarding.   Musculoskeletal: Normal range of motion. She exhibits no edema or tenderness.   Lymphadenopathy:     She has no cervical adenopathy.   Neurological: She is alert and oriented to person, place, and time. No cranial nerve deficit.   Skin: Skin is warm and dry. She is not diaphoretic.   Psychiatric: She has a normal mood and affect. Her behavior is normal. Judgment and thought content normal.   Vitals reviewed.       Significant Labs:   CBC:   Recent Labs  Lab 03/10/17  2127 03/11/17  0615   WBC 2.00* 0.90*   HGB 7.9* 6.3*   HCT 24.8* 18.9*   PLT 20* 14*     CMP:   Recent Labs  Lab 03/10/17  2127 03/11/17  0522   * 129*   K 2.9* 3.7   CL 87* 96   CO2 23 22*    107   BUN 8 6   CREATININE 0.5 0.4*   CALCIUM 9.2 8.0*   PROT 6.1  --    ALBUMIN 1.9* 1.5*   BILITOT 0.9  --    ALKPHOS 236*  --    AST 17  --    ALT 26  --    ANIONGAP 17* 11   EGFRNONAA >60 >60       Significant Imaging: reviewed all recent imaging    Assessment/Plan:     * Intravascular volume depletion  Aggressive hydration  Po intake poor  Associated with tachycardia  Telemetry   Will trend      Hypomagnesemia  Repleted but remains low  Will order 3 gms today  Recheck tonight      Hypokalemia  Repleted   Recheck tonight      Vertebral compression fracture  Pathological fx  See above      History of pulmonary embolism, February 2017  Hold Apixaban due to thrombocytopenia      Chemotherapy-induced neutropenia  C1 D12 of carbo/taxol, still shy of keagan counts  Neutropenia precautions  Start colony stimulating factor if ok with oncology who has been consulted  Trend counts      Drug-induced pancytopenia  Transfuse 2 units today  No bleeding with platelets at 14 K, will prepare one unit to have on hand  Recheck CBC tonight    Hyponatremia  NS  Check urine sodium   Worrisome for SIADH  Asymptomatic at this time  Will trend       Pain due to malignant neoplasm metastatic to bone  Severe, intractable pain of back and left femur  Pt  declined re-imaging to follow up on bone mets since XRT  Started fentanyl patch at home dose for pt had not had it filled  Ordered dilaudid PCA with C02 monitoring  Add steroids   Pt may benefit from additional XRT, will defer to oncology to make that decision and referral           VTE Risk Mitigation         Ordered     High Risk of VTE  Once      03/11/17 0021     Place KAREN hose  Until discontinued      03/11/17 0021     Reason for No Pharmacological VTE Prophylaxis  Once      03/11/17 0021        Amber Forrester NP  Department of Hospital Medicine   [facility]

## 2017-03-23 NOTE — PROGRESS NOTES
Pt taken off bipap at this time and placed on 40% venti mask per MARIETTA Forrester NP, bipap made prn.

## 2017-03-23 NOTE — PLAN OF CARE
Met with pt to complete her assessment.  Pt lives at home with her sig other and daughter.  Pt needs assistance with her ADLs.  Her PCP is NAMITA Matamoros and insurance is Medicaid, [facility].  Discussed discharge needs with pt who stated that she needs a wheelchair as she is having difficulty getting around.  Pt stated that since her discharge from the hospital the home health agency called however the nurse has not been by to see her.  Pt does not remember the name of the home health agency.  Obtained signature for the disclosure form.  Plan for pt to discharge home with home health services.  Will inform the physician that pt is requesting a wheelchair for home use.     Received consult that pt is requesting advanced directive information.  Pt denies asking for information but did take the forms.  Pt declined assistance with with completing the forms at this time.    Spoke to Madeline at [facility], [phone] who verified that pt was on their caseload.  Informed her pt stated that the nurse had not come by to see her since her discharge from the hospital on 3-13-17.  Madeline said she would check with the nurse and call me back with update me.  I verified that the [facility] had pt's correct address and phone number.    12:55 p.m. - received a call back from Madeline who reported that the home health nurse stated she was unsuccessful in making phone contact with pt until this past Tuesday when she left pt a message and did not hear back from pt.  The voice mail box reportedly has been full.      03/23/17 1215   Discharge Assessment   Assessment Type Discharge Planning Assessment   Confirmed/corrected address and phone number on facesheet? Yes   Assessment information obtained from? Patient   Communicated expected length of stay with patient/caregiver no   Type of Healthcare Directive Received (Denies.)   If Healthcare Directive is received, is it scanned into Epic? no (comment)    Prior to hospitilization cognitive status: Alert/Oriented   Prior to hospitalization functional status: Needs Assistance;Assistive Equipment   Current cognitive status: Alert/Oriented   Current Functional Status: Needs Assistance   Arrived From home health   Lives With significant other;child(konrad), dependent   Able to Return to Prior Arrangements yes   Is patient able to care for self after discharge? Unable to determine at this time (comments)   Does the patient have family/friends to help with healtcare needs after discharge? yes   How many people do you have in your home that can help with your care after discharge? 2   Who are your caregiver(s) and their phone number(s)? (sig other Louie Gill, [phone])   Patient's perception of discharge disposition home health   Readmission Within The Last 30 Days previous discharge plan unsuccessful   Patient currently being followed by outpatient case management? No   Patient currently receives home health services? Yes  (Home health was ordered upon pt's dischage from hospital however pt stated they called but the nurse never came  to see her.)   Does the patient currently use HME? Yes   Patient currently receives private duty nursing? No   Patient currently receives any other outside agency services? No   Equipment Currently Used at Home bedside commode;oxygen;walker, rolling   Do you have any problems affording any of your prescribed medications? No   Is the patient taking medications as prescribed? yes  (pt stated she is waiting for approval from her physician to have the fentanyl refilled)   Do you have any financial concerns preventing you from receiving the healthcare you need? No   Does the patient have transportation to healthcare appointments? Yes   Transportation Available family or friend will provide   On Dialysis? No   Does the patient receive services at the Coumadin Clinic? No   Discharge Plan A Home Health   Discharge Plan B Home with family    Patient/Family In Agreement With Plan yes

## 2017-03-23 NOTE — ASSESSMENT & PLAN NOTE
Nutrition Problem:   Severe Malnutrition  .  % Intake of Estimated Energy Needs: 0 - 25 %  % Meal Intake: Other: NPO  Malnutrition Reason: Illness Related  Severe Weight Loss Acute Illness/Injury: greater than 7.5% in 3 months    Etiology/Related to:   Decreased ability to consume sufficient energy AEB 9.3% weight loss in 3 months, npo    Treatment Strategy:   1.) Recommend 2000 diabetic diet (send fruit at every meal) + boost glucose control tid     Nutrition Diagnosis Status:   New

## 2017-03-23 NOTE — PROGRESS NOTES
Patient has had a rough morning with pain and being uncomfortable in bed. Patient has complained of right arm pain that just started this morning; ROM ok, arm is warm to touch, pulse is good, no numbness or tingling. Patient is also complaining of back pain. Patient has been repositioned multiple times with minimal effectiveness that does not last long. Mattress on the bed has been made firm because the patient states she likes a firm mattress at home, it had been on medium. Heat pack was used for patient right arm, but she states it did not help her pain. Dr. Plasencia was notified of patient pain this morning.

## 2017-03-23 NOTE — INTERVAL H&P NOTE
The patient has been examined and the H&P has been reviewed:    I concur with the findings and changes have been noted since the H&P was written: presented with acute respiratory failure. noted to have hypoxia, tachypnea and tachycardia. recent history and ROS limited due to Bipap mask and SOB. no evidence of confusion. pt ok with BIPAP but does not want to be intubated. no famiily present. admitted to the ICU. empiric coverage for HCAP. daily CXR. consult pulmonary and oncology. resume fentanyl patch with IV dilaudid for breakthrough. will check venous blood gas to evaluate for C02 retention. poor prognosis.         Active Hospital Problems    Diagnosis  POA    Acute respiratory failure [J96.00]  Yes      Resolved Hospital Problems    Diagnosis Date Resolved POA   No resolved problems to display.

## 2017-03-23 NOTE — ED NOTES
"Patient identifiers for Kat Corley checked and correct.  LOC: Patient is awake, alert, and aware of environment with an appropriate affect. Patient is oriented x 3 and speaking appropriately.  APPEARANCE: Patient appears in distress.  SKIN: The skin is warm and dry. Patient has normal skin turgor and moist mucus membrances. Skin is intact; no bruising or breakdown noted. Skin color is pale  MUSCULOSKELETAL: Patient unable to ambulate x 2 months, unable to sit up in bed without assistance. Pulses intact.   RESPIRATORY: Airway is open and patent. Respirations are spontaneous and labored, wheezing in all lung fields  CARDIAC: Patient has a Sinus tachycardic rate of 135 without ectopy. Bilateral pitting edema. Capillary refill < 3 seconds.  ABDOMEN: No distention noted. Bowel sounds active in all 4 quadrants. Soft and non-tender upon palpation.  NEUROLOGICAL: PERRL. Right eye is closed, pt reports this onset 2 months ago and states "I just woke up and it wouldn't open". Hand grasps are equal bilaterally with decrease in sensation, strength 3/5. Normal sensation in all extremities when touched with finger.   Allergies reported: Review of patient's allergies indicates:  No Known Allergies    "

## 2017-03-23 NOTE — CONSULTS
[facility]  Adult Nutrition  Consult Note    SUMMARY     Recommendations  Recommendation/Intervention: 1.) Recommend Adult regular diet (send fruit at every meal) + boost glucose control tid   Goals: 1.) diet will advance within 72 hrs.  Nutrition Goal Status: new  Communication of NICHOLAS Recs:  (second sign to MD)    1. Shortness of breath    2. COPD exacerbation    3. Metastatic cancer    4. Atelectasis    5. Acute respiratory failure    6. Lymphangitic lung metastasis, unspecified laterality    7. Multiple lesions of metastatic malignancy    8. Metastatic cancer to bone      Past Medical History:   Diagnosis Date    Anticoagulant long-term use     Cancer     COPD (chronic obstructive pulmonary disease)     Neuropathy     Pneumonia          Reason for Assessment  Reason for Assessment: nurse/nurse practitioner consult  Interdisciplinary Rounds: attended  General Information Comments: Admits with respiratory distress. Hx of metastic cancer. Not ready for hospice. NPO. 15# weight loss noted in 3 months from stated UBW. Reports she drinks orange boost at home.     Nutrition Prescription Ordered  Current Diet Order: NPO        Evaluation of Received Nutrients/Fluid Intake  IV Fluid (mL): 3000     Nutrition Risk Screen  Nutrition Risk Screen: unintentional loss of 10 lbs or more in the past 2 mos, reduced oral intake over the last month    Nutrition/Diet History  Supplemental Drinks or Food Habits: Boost Plus, Boost Breeze    Labs/Tests/Procedures/Meds  Diagnostic Test/Procedure Review: reviewed, pertinent  Pertinent Labs Reviewed: reviewed, pertinent  BMP  Lab Results   Component Value Date     (L) 03/23/2017    K 3.1 (L) 03/23/2017    CL 92 (L) 03/23/2017    CO2 32 (H) 03/23/2017    BUN 5 (L) 03/23/2017    CREATININE 0.5 03/23/2017    CALCIUM 8.4 (L) 03/23/2017    ANIONGAP 10 03/23/2017    ESTGFRAFRICA >60 03/23/2017    EGFRNONAA >60 03/23/2017     Lab Results   Component Value Date     ALBUMIN 1.5 (L) 2017     Lab Results   Component Value Date    CALCIUM 8.4 (L) 2017    PHOS 3.9 2017     No results for input(s): POCTGLUCOSE in the last 24 hours.  Lab Results   Component Value Date    HGBA1C 6.3 (H) 2017     No results found for: CHOL  No results found for: HDL  No results found for: LDLCALC  No results found for: TRIG  No results found for: CHOLHDL    Pertinent Medications Reviewed: reviewed  Scheduled Meds:   albuterol-ipratropium 2.5mg-0.5mg/3mL  3 mL Nebulization Q6H    amitriptyline  50 mg Oral QHS    apixaban  5 mg Oral BID    ciprofloxacin  400 mg Intravenous Q12H    dexamethasone  8 mg Oral Q12H    fentaNYL  1 patch Transdermal Q72H    gabapentin  300 mg Oral TID    morphine  30 mg Oral BID    pantoprazole  40 mg Intravenous Daily    piperacillin-tazobactam (ZOSYN) IVPB  3.375 g Intravenous Q6H    vancomycin (VANCOCIN) IVPB  15 mg/kg Intravenous Q8H     Continuous Infusions:   sodium chloride 0.9% 125 mL/hr at 17 1100         Physical Findings  Overall Physical Appearance: on oxygen therapy  Oral/Mouth Cavity: WDL  Skin:  (Yoan score 15)    Anthropometrics  Height (inches): 62.01 in  Weight Method: Bed Scale  Weight (kg): 66.2 kg  Ideal Body Weight (IBW), Female: 110.05 lb  % Ideal Body Weight, Female (lb): 132.62 lb  BMI (kg/m2): 26.69  BMI Grade: 25 - 29.9 - overweight  Usual Body Weight (UBW), k.7 kg  % Usual Body Weight: 91.06  % Weight Change:  (9.3%)  Weight Loss: unintentional    Estimated/Assessed Needs  Weight Used For Calorie Calculations: 66.2 kg (145 lb 15.1 oz)   Height (cm): 157.5 cm  Energy Need Method: [place]-St Jeor (x1.5= 1902 kcals/day)  RMR ([place]-St. Jeor Equation): 1268.23  Weight Used For Protein Calculations: 66.2 kg (145 lb 15.1 oz)  1.2 gm Protein (gm): 79.61 and 2.0 gm Protein (gm): 132.68  Fluid Need Method: RDA Method (or per MD)      Monitor and Evaluation  Food and Nutrient Intake: energy intake, food and  beverage intake  Food and Nutrient Adminstration: diet order  Anthropometric Measurements: weight, weight change, body mass index  Biochemical Data, Medical Tests and Procedures: electrolyte and renal panel, glucose/endocrine profile, lipid profile  Nutrition-Focused Physical Findings: skin    Nutrition Risk  Level of Risk:  (x2 weekly)    Nutrition Follow-Up    RD Follow-up?: Yes    Assessment and Plan    Severe protein-calorie malnutrition  Nutrition Problem:   Severe Malnutrition  .  % Intake of Estimated Energy Needs: 0 - 25 %  % Meal Intake: Other: NPO  Malnutrition Reason: Illness Related  Severe Weight Loss Acute Illness/Injury: greater than 7.5% in 3 months    Etiology/Related to:   Decreased ability to consume sufficient energy AEB 9.3% weight loss in 3 months, npo    Treatment Strategy:   1.) Recommend 2000 diabetic diet (send fruit at every meal) + boost glucose control tid     Nutrition Diagnosis Status:   New    Discharge planning: unable to determine at this time

## 2017-03-23 NOTE — PROGRESS NOTES
03/23/17 0815   Patient Assessment/Suction   Level of Consciousness (AVPU) alert   All Lung Fields Breath Sounds coarse;diminished   PRE-TX-O2-ETCO2   O2 Device (Oxygen Therapy) venti mask  (changed to HFNC 8L following tx. Pt keeps removing vm)   $ Is the patient on Oxygen? Yes   Oxygen Concentration (%) 40   SpO2 (!) 92 %   Pulse Oximetry Type Continuous   $ Pulse Oximetry - Multiple Charge Pulse Oximetry - Multiple   Pulse 74   Resp (!) 24   Aerosol Therapy   $ Aerosol Therapy Charges Aerosol Treatment   Respiratory Treatment Status given   SVN/Inhaler Treatment Route mask;with oxygen   Position During Treatment HOB at 30 degrees   Patient Tolerance good   Post-Treatment   Post-treatment Heart Rate (beats/min) 79   Post-treatment Resp Rate (breaths/min) 24   All Fields Breath Sounds unchanged   Ready to Wean/Extubation Screen   FIO2<60 (chart decimal) 0.4

## 2017-03-23 NOTE — PLAN OF CARE
Problem: Patient Care Overview  Goal: Plan of Care Review  Outcome: Ongoing (interventions implemented as appropriate)  Will continue with current care plan.     Problem: Infection, Risk/Actual (Adult)  Goal: Identify Related Risk Factors and Signs and Symptoms  Related risk factors and signs and symptoms are identified upon initiation of Human Response Clinical Practice Guideline (CPG)   Outcome: Ongoing (interventions implemented as appropriate)  Patient receiving IV antibiotics for treatment of pneumonia. Patient has been afebrile.     Problem: Pressure Ulcer Risk (Yoan Scale) (Adult,Obstetrics,Pediatric)  Goal: Identify Related Risk Factors and Signs and Symptoms  Related risk factors and signs and symptoms are identified upon initiation of Human Response Clinical Practice Guideline (CPG)   Outcome: Ongoing (interventions implemented as appropriate)  Patient repositioned frequently this shift due to pain and discomfort. Patient is on a specialty bed and mattress inflated to firm per patient liking.     Problem: Fall Risk (Adult)  Goal: Absence of Falls  Patient will demonstrate the desired outcomes by discharge/transition of care.   Outcome: Ongoing (interventions implemented as appropriate)  Patient safety maintained this shift.     Problem: Pneumonia (Adult)  Goal: Signs and Symptoms of Listed Potential Problems Will be Absent, Minimized or Managed (Pneumonia)  Signs and symptoms of listed potential problems will be absent, minimized or managed by discharge/transition of care (reference Pneumonia (Adult) CPG).   Outcome: Ongoing (interventions implemented as appropriate)  Patient continues to have IV antibiotics, breathing treatments, oxygen therapy with HFNC at 5 L. SPO2 has mostly stayed above 90%.      Problem: Pain, Acute (Adult)  Goal: Identify Related Risk Factors and Signs and Symptoms  Related risk factors and signs and symptoms are identified upon initiation of Human Response Clinical Practice  Guideline (CPG)   Outcome: Ongoing (interventions implemented as appropriate)  Pain poorly controlled at beginning of shift. New orders obtained.  Patient pain is now controlled and patient is resting quietly.     Problem: Nutrition, Imbalanced: Inadequate Oral Intake (Adult)  Goal: Identify Related Risk Factors and Signs and Symptoms  Related risk factors and signs and symptoms are identified upon initiation of Human Response Clinical Practice Guideline (CPG)   Outcome: Ongoing (interventions implemented as appropriate)  Patient is NPO at this time except for water with meds. Patient has had no N/V.

## 2017-03-23 NOTE — CONSULTS
3/23/2017      Admit Date: 3/22/2017  aKt Corley  New Patient Consult    Chief Complaint   Patient presents with    Shortness of Breath     x 4 days with recent dx of Pneumonia in past 2 weeks       History of Present Illness:  Pt smoker with dx copd age 46?  She was dx breast cancer earlier in year and has receive one course chemo.  Pt was dx at [facility] of pulm emboli.  Pt has had ptosis right eye since 1st year with no clear dx. Pt has known bone and liver mets.     Pt presented to [facility] with sob last pm.  Pulse 120's and sat 90% on 4lpm.  Pt required niv/b iap    Pt currently in icu on high flow nasal 02 with sat 90%.  Pt having exquisite pain right shoulder, c/o poorly controlled pain.      Pt was seen by Dr Arriola last admit 3/11/2017:  ASSESSMENT/PLAN:      Imaging studies reveal progression of metastatic disease  Pt would qualify for Hospice but prefers to cont therapy at this point in time  For anemia and thrombocytopenia due to chemotherapy Cont transfusions of RBCs and platelets prn  Goal is Hb greater than 7 and platelets greater than 20  Would start lovenox 1 mg per kg bid when platelets greater than 50  Consider IVC filter placement in future  For neutropenia due to chemo Start neupogen daily to help stimulate marrrow  Stop neupogen once anc greater than 1000     Pt may follow up with Dr Rasmussen once discharged  Thank you    PFSH:  Past Medical History:   Diagnosis Date    Anticoagulant long-term use     Cancer     COPD (chronic obstructive pulmonary disease)     Neuropathy     Pneumonia      Past Surgical History:   Procedure Laterality Date     SECTION      TONSILLECTOMY      TUBAL LIGATION       Social History   Substance Use Topics    Smoking status: Current Every Day Smoker     Packs/day: 0.50     Years: 15.00     Types: Cigarettes    Smokeless tobacco: None    Alcohol use No     History reviewed. No pertinent family history.  Review of patient's allergies indicates:  No Known  "Allergies       Review of Systems:  a review of eleven systems covering constitutional, Psych, Eye, HEENT, Respiratory, Cardiac, GI, , Musculoskeletal, Endocrine, Dermatologicwas negative except the above mentioned abnormalities and for any pertinent findings as listed below: not eating, hasn't been out of bed for last week, no recent bm- cannot recal, no cough  Or wheezes, no mucous or clear mucous, no heart or swallow issues. Needs max assit to transfer bed to chair.       Exam:Comprehensive exam done. /78  Pulse 101  Temp 97.8 °F (36.6 °C) (Oral)   Resp (!) 23  Ht 5' 2" (1.575 m)  Wt 66.2 kg (145 lb 15.1 oz)  LMP 01/23/2017 Comment: stopped when dx with CA  SpO2 (!) 91%  Breastfeeding? No  BMI 26.69 kg/m2  Exam included Vitals as listed, and patient's appearance and affect and alertness and mood, oral exam for yeast and hygiene and pharynx lesions and Mallapatti (M) score, neck with inspection for jvd and masses and thyroid abnormalities and lymph nodes (supraclavicular and infraclavicular nodes also examined and noted if abn), chest exam included symmetry and effort and fremitus and percussion and auscultation, cardiac exam included rhythm and gallops and murmur and rubs and jvd and edema, abdominal exam for mass and hepatosplenomegaly and tenderness and hernias and bowel sounds, Musculoskeletal exam with muscle tone and posture and mobility/gait and  strenght, and skin for rashes and cyanosis and pallor and turgor, extremity for clubbing.  Findings were normal except as listed below:  Right ptosis is severe, dry mouth, no nodes in neck, no resp distress, cor nl but tachycardic, puffy with no edema. Alert but mild groogy, grimmaces with pain freq.    Radiographs reviewed: view by direct vision ct chest feb had poor dye bolus and read as pe in rul anterior.  Min nodules seen 2/13,  Now with impressive diffuse nodules worse on left but bilaterally seen .  Left lower lung with lobar " atelectasis, progressive process since 2/13.  radiology did not report apparent diffuse lung nodules on chest ct but somewhat on abd ct? But suggested increase ild pattern to edema?  Faint nodules suggested also on fissures c/w lymphangitic dz.  Findings:    The tip of a central venous catheter is visible near the union of the SVC and right atrium.  There are coronary artery calcifications present.  No pericardial fluid.  The distal esophagus appears dilated.  There is left basilar airspace consolidative change present with associated air bronchograms.  There is a mosaic lung pattern throughout the visualized left and right chest suggesting either small airways disease or pulmonary edema.  There is linear atelectasis versus pneumonitis present within the left lingula and at the right lung base.  There are multiple noncalcified pulmonary nodular opacities visible within the right chest.  One nodule measures 4 mm in the right lower lobe (series 2 image 5).  Another noncalcified nodule measures 4.5 mm in the right lower lobe and shows possible central cavitation no (series 2 image 6).  No significant volume of pleural fluid.  There are multiple soft tissue nodules present over the soft tissues of the chest wall, most notably in the anterior chest wall (series 2 image 13-16, and adjacent to the right scapula (series 2 image 1) which are highly concerning for soft tissue metastases.  These appear more pronounced in size and number than at the time of the patient's prior CT scan.    There is pneumobilia present.  There is a biliary stent present within the common bile duct which extends into the 2nd portion of the duodenum and which demonstrates an associated air-fluid level distally.  There are multiple small ill-defined hypodensities present within the liver which are concerning for hepatic metastatic disease.  The largest visualized hypodensity measures approximately 10 mm (series 2 image 23) within the lateral segment  of the left hepatic lobe.  A previously measured soft tissue nodule within the midline ventral abdominal wall and anteriorly measures 12 mm (series 2 image 30), similar to the prior exam.  A previously noted soft tissue nodule at the lower inner aspect of the left breast measures 16 mm on today's examination (series 2 image 19), larger than at the time of the prior study.  The portal vein is patent.  The spleen is unremarkable.  The duodenal C-loop is unremarkable.  There is a 3.2 cm soft tissue density observed in the vicinity of the pancreatic head.  A pancreatic head mass cannot be entirely excluded.  This are enlarged and heterogeneous measuring approximately 5.3 x 2.4 cm in maximal dimension on the right and 4.5 x 3.3 cm in maximal dimension on the left.  These findings are compatible with adrenal metastasis which have increased in size when compared with the prior examination.  There is an enlarged portacaval lymph node which measures 14 mm in short axis (series 2 image 32), larger than at the time of the prior study.  The abdomin  al aorta is non-aneurysmal.  There is atherosclerotic calcification present within the abdominal aorta and iliac vasculature.  No pathologically enlarged retroperitoneal lymph nodes.    There is a large volume of stool in the left colon and rectum.  Please correlate for symptoms of constipation.  There is mucosal thickening and enhancement over a relatively long segment of the descending colon and transverse colon which could relate to infectious colitis.  Ischemic colitis is not entirely excluded but is considered less likely given the patient's young age.  Please correlate clinically.  There are several scattered noncalcified nodular densities within the ventral peritoneum along the greater omentum (series 2 image 38-40 and along the anterior margin of the descending colon (series 2 image 48-54) which are most likely related to peritoneal metastatic disease, new when compared with  the previous study.  The kidneys show no definite abnormalities.    The urinary bladder is unremarkable.  The uterus is unremarkable.  No inguinal lymphadenopathy or angle hernia.  There are multiple soft tissue nodules scattered over the subcutaneous soft tissues of the lumbar region and buttocks.    There is diffuse osseous metastatic disease visible, more pronounced than at the time of the prior examination.  There is a central L2 compression fracture and central T9 compression fracture.  The L2 compression fracture shows mild retropulsion at its posterior inferior aspect resulting in severe central canal stenosis (series 2 image 37), similar to the prior CT of the lumbar spine.  Tumor extension into the ventral epidural space is suspected.       Impression          1.  Imaging findings which are most compatible with progression of metastatic disease within the visualized chest, abdomen, pelvis, and axial skeleton.    2.  Apparent bowel wall thickening and mucosal enhancement involving the ascending colon and transverse colon which could relate to infectious colitis.    3.  Diffuse osseous metastatic disease with central compression fractures noted at T9 and L2.  There is very likely epidural tumor burden noted at L2, similar to the prior CT of the lumbar spine performed 2/13/2017.    4.  Mosaic lung pattern at the lung bases and patchy areas of segmental airspace opacity in the lower lung zones with differential considerations provided above.        Electronically signed by: Rich Fernandes MD  Date: 03/11/17  Time: 08:15      Impression       No CT evidence of pulmonary embolus.  Pneumonia and consolidation of the left lower lobe with additional pneumonia seen in the left lingula and right lower lobe.  Probable pulmonary edema with mild cardiomegaly.  Extensive metastatic disease noted in the bones, subcutaneous tissues and bilateral adrenal glands with a tumor identified in the pancreatic  head      Electronically signed by: Sunday Schwartz MD  Date: 03/23/17                       Labs       Results for AMANDA RAE (MRN [id number]) as of 3/23/2017 15:00   Ref. Range 3/23/2017 01:03   POC PH Latest Ref Range: 7.35 - 7.45  7.457 (H)   POC PCO2 Latest Ref Range: 35 - 45 mmHg 48.1 (H)   POC PO2 Latest Ref Range: 40 - 60 mmHg 42   POC BE Latest Ref Range: -2 to 2 mmol/L 10   POC HCO3 Latest Ref Range: 24 - 28 mmol/L 34.0 (H)   POC SATURATED O2 Latest Ref Range: 95 - 100 % 79 (L)   POC TCO2 Latest Ref Range: 24 - 29 mmol/L 35 (H)   FiO2 Unknown 40   Sample Unknown VENOUS   DelSys Unknown CPAP/BiPAP   Allens Test Unknown N/A       Results for AMANDA RAE (MRN [id number]) as of 3/23/2017 15:00   Ref. Range 3/22/2017 19:32 3/23/2017 03:51   WBC Latest Ref Range: 3.90 - 12.70 K/uL 8.70 4.30   RBC Latest Ref Range: 4.00 - 5.40 M/uL 4.05 3.17 (L)   Hemoglobin Latest Ref Range: 12.0 - 16.0 g/dL 10.6 (L) 8.6 (L)   Hematocrit Latest Ref Range: 37.0 - 48.5 % 33.8 (L) 26.4 (L)   MCV Latest Ref Range: 82 - 98 fL 84 83   MCH Latest Ref Range: 27.0 - 31.0 pg 26.3 (L) 27.3   MCHC Latest Ref Range: 32.0 - 36.0 % 31.5 (L) 32.7   RDW Latest Ref Range: 11.5 - 14.5 % 21.8 (H) 21.3 (H)   Platelets Latest Ref Range: 150 - 350 K/uL 68 (L) 44 (L)   MPV Latest Ref Range: 9.2 - 12.9 fL 9.9 10.3   Gran% Latest Ref Range: 38.0 - 73.0 % 82.8 (H) 92.9 (H)   Gran # Latest Ref Range: 1.8 - 7.7 K/uL 7.2 4.0     Results for AMANDA RAE (MRN [id number]) as of 3/23/2017 15:00   Ref. Range 3/23/2017 03:51   Sodium Latest Ref Range: 136 - 145 mmol/L 134 (L)   Potassium Latest Ref Range: 3.5 - 5.1 mmol/L 3.1 (L)   Chloride Latest Ref Range: 95 - 110 mmol/L 92 (L)   CO2 Latest Ref Range: 23 - 29 mmol/L 32 (H)   Anion Gap Latest Ref Range: 8 - 16 mmol/L 10   BUN, Bld Latest Ref Range: 6 - 20 mg/dL 5 (L)   Creatinine Latest Ref Range: 0.5 - 1.4 mg/dL 0.5   eGFR if non African American Latest Ref Range: >60 mL/min/1.73 m^2 >60   eGFR if African  American Latest Ref Range: >60 mL/min/1.73 m^2 >60   Glucose Latest Ref Range: 70 - 110 mg/dL 230 (H)   Calcium Latest Ref Range: 8.7 - 10.5 mg/dL 8.4 (L)   Magnesium Latest Ref Range: 1.6 - 2.6 mg/dL 1.0 (L)   Alkaline Phosphatase Latest Ref Range: 55 - 135 U/L 180 (H)   Total Protein Latest Ref Range: 6.0 - 8.4 g/dL 4.6 (L)   Albumin Latest Ref Range: 3.5 - 5.2 g/dL 1.5 (L)   Prealbumin Latest Ref Range: 20 - 43 mg/dL 8 (L)   Total Bilirubin Latest Ref Range: 0.1 - 1.0 mg/dL 0.5   AST Latest Ref Range: 10 - 40 U/L 20   ALT Latest Ref Range: 10 - 44 U/L 21       Impression:     Active Hospital Problems    Diagnosis  POA    Lymphangitic lung metastasis [C78.00]  Yes    Atelectasis of left lung [J98.11]  Yes    Acute on chronic respiratory failure with hypoxia and hypercapnia [J96.21, J96.22]  Yes    Metastatic cancer to bone [C79.51]  Yes    Multiple lesions of metastatic malignancy [C79.9]  Yes      Resolved Hospital Problems    Diagnosis Date Resolved POA   No resolved problems to display.           Plan:     Pt relates she cannot travel to get oncology evaluation  She was seen by Dr Arriola last admit.  She appears to me to be beyond chem.  Lung nodules are rapidly growing and respiratory failure will progress.  Left lower lobe lacks air bronchograms and prior ct was vaguely abn in that area.  Endobronchial mets could be present or could just be due to bedbound state/splinting/etc.    Pt needs better control of pain and this is discussed.  She takes high doses already and is groogy.  Consider increasing to ms drip as appropriate.    Pt's resp status is going to continue to worsen.  Doubt abx will improve.  Dx of pe was not optimal with poor dye bolus to my view.  Supportive care as able.    Pt is dying from what I see.

## 2017-03-23 NOTE — PROGRESS NOTES
Received patient from ER on stretcher with portable monitor and venti mask in use. Patient transferred with max assist to ICU bed 517. Connected to monitor and BIPAP. VSS. Oriented x 4. SOB with talking and moving. Drousy with pain meds. Unable to complete much of admit assessment due to weakened/medicated state. All care explained prior to doing. Emotional support provided. No family with patient.

## 2017-03-23 NOTE — PROGRESS NOTES
Continue to attempt to get patients pain under control. Patient does rest for very short time, but the pain awakens her and she cries and does need assistance to move/reposition. Patient continue to c/o back pain. Massaged patients back for her, with no success in alleviating some of the pain, and she stated that her right shoulder/shoulder blade area is very painful too. A pillow was gently tucked behind her back as she lay partially on her left side, to give her some support. Patient appears to be sleeping at this time. SPO2 100%.

## 2017-03-23 NOTE — PLAN OF CARE
"   03/23/17 1210   Readmission Questionnaire   At the time of your discharge, did someone talk to you about what your health problems were? No   At the time of discharge, did someone talk to you about what to watch out for regarding worsening of your health problem? No   At the time of discharge, did someone talk to you about what to do if you experienced worsening of your health problem? Yes   At the time of discharge, did someone talk to you about which medication to take when you left the hospital and which ones to stop taking? Yes   At the time of discharge, did someone talk to you about when and where to follow up with a doctor after you left the hospital? Yes   What do you believe caused you to be sick enough to be re-admitted? ("got sick . .  I don't know why")   How often do you need to have someone help you when you read instructions, pamphlets, or other written material from your doctor or pharmacy? Sometimes   Do you have problems taking your medications as prescribed? No   Do you have any problems affording any of  your prescribed medications? No   Do you have problems obtaining/receiving your medications? Yes  ("the pharmacist is giving me the run around . . . says he has to get the doctors approval" for fentanyl)   Does the patient have transportation to healthcare appointments? Yes   Lives With significant other;child(konrad), adult  (sig other and adult daughter Dionna Corley)   Living Arrangements mobile home   Does the patient have family/friends to help with healtcare needs after discharge? yes   Who are your caregiver(s) and their phone number(s)? (sig other Louie Gill, [phone])   Does your caregiver provide all the help you need? Yes   Are you currently feeling confused? No   Are you currently having problems thinking? No   Are you currently having memory problems? No   Have you felt down, depressed, or hopeless? 0   Have you felt little interest or pleasure in doing things? 0   In the " last 7 days, my sleep quality was: very poor

## 2017-03-23 NOTE — PLAN OF CARE
Problem: Nutrition, Imbalanced: Inadequate Oral Intake (Adult)  Goal: Identify Related Risk Factors and Signs and Symptoms  Related risk factors and signs and symptoms are identified upon initiation of Human Response Clinical Practice Guideline (CPG)  Recommendations  Recommendation/Intervention: 1.) Recommend Adult regular diet (send fruit at every meal) + boost glucose control tid   Goals: 1.) diet will advance within 72 hrs.  Nutrition Goal Status: new  Communication of RD Recs: (second sign to MD)

## 2017-03-23 NOTE — ED PROVIDER NOTES
Encounter Date: 3/22/2017    SCRIBE #1 NOTE: Casi HERCULES, am scribing for, and in the presence of, Dr. Handley.       History     Chief Complaint   Patient presents with    Shortness of Breath     x 4 days with recent dx of Pneumonia in past 2 weeks     Review of patient's allergies indicates:  No Known Allergies  HPI Comments: 3/22/2017  7:01 PM     Chief Complaint: SOB     The patient is a 46 y.o. female with PMHx of neuropathy, cancer and pneumonia who presents with sob via EMS. Patient was given combivent, asa and nitroglycerin en route to the ED. Blood pressure is 125/79 with tachycardia in the 120s and oxygen saturation is 90% on 4 L per EMS. Patient c/o sudden onset of worsening shortness of breath which has been constant the past 3 hours. Pt has achy, chest wall pain and mild back pain which has been waxing and waning for 1 day. Her pain is worsened with deep breathing. She also reports productive cough with clear mucous for 1 week. No blood in sputum. She currently complains of headache which started immediately after ingesting nitroglycerin. Patient has chronic leg swelling. No nausea, vomiting, sweating, fever, dysuria, hematuria, blood in stools or abdominal pain. The patient was recently discharged at [facility] for pneumonia. Pt reports her chest wall pain has been persistent since discharge. Patient was recently diagnosed with bone and breast cancer. Her oncologist is Dr. Sherman. Her last chemo was given 2 weeks ago. Patient reports her right eye has been drooping for months. Shx of c section and tubal ligation.      The history is provided by the patient and the EMS personnel.     Past Medical History:   Diagnosis Date    Cancer     COPD (chronic obstructive pulmonary disease)     Neuropathy     Pneumonia      Past Surgical History:   Procedure Laterality Date     SECTION      TONSILLECTOMY      TUBAL LIGATION       History reviewed. No pertinent family history.  Social  History   Substance Use Topics    Smoking status: Current Every Day Smoker     Packs/day: 0.50     Years: 15.00     Types: Cigarettes    Smokeless tobacco: None    Alcohol use No     Review of Systems   Constitutional: Negative for appetite change, chills and fever.   HENT: Negative for congestion, rhinorrhea and sore throat.    Eyes:        + Drooping of the right eye.   Respiratory: Positive for cough and shortness of breath.         No blood in sputum.   Cardiovascular: Positive for chest pain (chest wall pain) and leg swelling (chronic bilaterally).   Gastrointestinal: Negative for abdominal pain, blood in stool, diarrhea, nausea and vomiting.   Genitourinary: Negative for dysuria and hematuria.   Musculoskeletal: Positive for back pain. Negative for myalgias.   Skin: Negative for rash.   Neurological: Positive for headaches. Negative for weakness and numbness.   Hematological: Does not bruise/bleed easily.   All other systems reviewed and are negative.      Physical Exam   Initial Vitals   BP Pulse Resp Temp SpO2   -- -- -- -- --            Physical Exam    Nursing note and vitals reviewed.  Constitutional: No distress.   HENT:   Head: Normocephalic and atraumatic.   Mouth/Throat: Oropharynx is clear and moist and mucous membranes are normal.   Eyes:   Inability to look medially with medial rectus palsy to the right eye. Right eye is poorly active with mydriatic at 4 mm. Left eye EOM intact. Disconjugate gaze when look to left.   Neck: Normal range of motion.   Cardiovascular: Regular rhythm, normal heart sounds, intact distal pulses and normal pulses. Tachycardia present.  Exam reveals no gallop and no friction rub.    No murmur heard.  Pulmonary/Chest: Accessory muscle usage (mild) present. Tachypnea noted. No respiratory distress. She has no wheezes. She has rhonchi (coarse rhonchi to the posterior lung fields.). She has no rales. She exhibits no tenderness.   Pain on inspiratory with splint.   Abdominal:  Soft. She exhibits no distension. There is no tenderness.   Musculoskeletal: She exhibits edema (+2 pitting edema to bilateral lower extremities).   Legs are symmetric and non tender.   Neurological: She is alert and oriented to person, place, and time.   No facial palsy. Other sensation intact. 5/5 normal strength.   Skin: Skin is dry. No rash noted. No erythema.   Brisk capillary refill.   Psychiatric: She has a normal mood and affect.         ED Course   Critical Care  Date/Time: 3/23/2017 1:33 AM  Performed by: SONG KNIGHT  Authorized by: SIDNEY ROMERO   Direct patient critical care time: 25 minutes  Additional history critical care time: 4 minutes  Ordering / reviewing critical care time: 6 minutes  Documentation critical care time: 4 minutes  Consulting other physicians critical care time: 3 minutes  Total critical care time (exclusive of procedural time) : 42 minutes        Labs Reviewed   CBC W/ AUTO DIFFERENTIAL - Abnormal; Notable for the following:        Result Value    Hemoglobin 10.6 (*)     Hematocrit 33.8 (*)     MCH 26.3 (*)     MCHC 31.5 (*)     RDW 21.8 (*)     Platelets 68 (*)     Mono # 0.2 (*)     Gran% 82.8 (*)     Lymph% 15.0 (*)     Mono% 2.0 (*)     All other components within normal limits   COMPREHENSIVE METABOLIC PANEL - Abnormal; Notable for the following:     Sodium 135 (*)     Potassium 3.0 (*)     Chloride 90 (*)     CO2 31 (*)     Creatinine 0.4 (*)     Total Protein 5.6 (*)     Albumin 1.8 (*)     Alkaline Phosphatase 215 (*)     All other components within normal limits   TROPONIN I - Abnormal; Notable for the following:     Troponin I 0.087 (*)     All other components within normal limits   LACTIC ACID, PLASMA   LACTIC ACID, PLASMA    Narrative:     Lab repeat                        Scribe Attestation:   Scribe #1: I performed the above scribed service and the documentation accurately describes the services I performed. I attest to the accuracy of the  note.    Attending Attestation:           Physician Attestation for Scribe:  Physician Attestation Statement for Scribe #1: I, Dr. Handley, reviewed documentation, as scribed by Casi Nieves in my presence, and it is both accurate and complete.         Kat Corley is a 46 y.o. female presenting with shortness of breath likely multifactorial.  Patient has a history of COPD and I believe there is an element of COPD exacerbation.  Respirations are improved with continuous nebulizer treatment and IV steroids here.  There is significant work of breathing ultimately requiring positive pressure ventilation.  CT study shows no sign of recurrent PE but with possible multifocal pneumonia with reading may difficult secondary to metastatic lung disease.  There is also a significant aspect of atelectasis with possible mild pulmonary edema.  Fluid resuscitation with 1 L normal saline solution was given but I do not more aggressively fluid resuscitate for tachycardia given possibility of edema.  Broad-spectrum antibiotics in case of pneumonia were initiated given chronic illness and recent hospitalization.  Positive pressure ventilation may also help given findings of possible mild edema and atelectasis.  I did discuss with patient possible necessity of intubation to which she is supposed.  Patient is not ready to pursue hospice care at this point, but did not wish to be intubated when this was fully discussed including the risk of potential for inability to wean from the ventilator.  She wishes a trial of BiPAP initiated here which she is tolerating well with improvement.  I have discussed with hospitalist service who will admit the patient to ICU.        ED Course   Comment By Time   EKG:  Sinus tachycardia, rate of 133, normal intervals, L axis.  LAFB.  No new ST/T wave changes compared to last prior 3/10/17.  No sign of acute ischemia or infarction. Hayder Handley MD 03/22 1941   CXR: Resolving RLL infiltrate  compared to last prior with resolved L perihilar infiltrate. (my read) Hayder Handley MD 03/22 2017   CT-AP:  1. No pulmonary emboli identified.  2. Severely abnormal appearance of the lungs as above. Appearance is consistent with background  pulmonary edema with superimposed multifocal pneumonia. Metastatic disease could be contributing  as well. The degree of lung opacification is much greater than prior study.  3. Trace right pleural effusion, similar to prior study. Small left pleural effusion, larger than prior study.  4. Ill-defined heterogeneous mass in the pancreatic head, consistent with tumor.  5. Bilateral adrenal masses, concerning for metastatic disease, similar to prior study.  6. Extensive skeletal metastatic disease, similar to prior study.  7. Many soft tissue nodules in the chest wall bilaterally. These are concerning for tumor and are  overall enlarged from prior study. Hayder Handley MD 03/22 6690     Clinical Impression:   The primary encounter diagnosis was Shortness of breath. Diagnoses of COPD exacerbation, Metastatic cancer, Atelectasis, and Acute respiratory failure were also pertinent to this visit.          Hayder Handley MD  03/23/17 6228

## 2017-03-23 NOTE — PLAN OF CARE
Problem: Patient Care Overview  Goal: Plan of Care Review  Outcome: Ongoing (interventions implemented as appropriate)  Medicated x 2 for pain with dilaudid. Emotional support provided. Patient repeated pulled off venti mask stating that her nose itched. Reinforced need to keep mask on as patient expressed wish not to be intubated as well as not ready to consider hospice.    Problem: Infection, Risk/Actual (Adult)  Intervention: Manage Suspected/Actual Infection  Antibiotics given as ordered. VS Stable, Temp max 98.1 ax.      Problem: Pressure Ulcer Risk (Yoan Scale) (Adult,Obstetrics,Pediatric)  Intervention: Turn/Reposition Often  Excoriation to clifford area noted. Small open area noted to outer left buttock. Cleaned with wound cleanser and mepelix dsg applied. Reddish purple area noted to left groin where diaper appeared to be on tight. Shearing area beneath left groin. Pictures taken. Repositioned every 2 hours to  risk of further breakdown.

## 2017-03-23 NOTE — PLAN OF CARE
03/23/17 0113   Patient Assessment/Suction   Level of Consciousness (AVPU) alert   Respiratory Effort Mild;Labored   Expansion/Accessory Muscles/Retractions no use of accessory muscles   All Lung Fields Breath Sounds coarse;diminished   Rhythm/Pattern, Respiratory dyspneic   Cough Frequency infrequent   Cough Type weak;nonproductive   PRE-TX-O2-ETCO2   O2 Device (Oxygen Therapy) venti mask   $ Is the patient on Oxygen? Yes   Flow (L/min) 8   Oxygen Concentration (%) 40   SpO2 99 %   Pulse Oximetry Type Continuous   $ Pulse Oximetry - Multiple Charge Pulse Oximetry - Multiple   Pulse (!) 113   Resp 20   Aerosol Therapy   $ Aerosol Therapy Charges Aerosol Treatment   Respiratory Treatment Status given   SVN/Inhaler Treatment Route mask   Position During Treatment HOB at 30 degrees   Patient Tolerance good   Post-Treatment   Post-treatment Heart Rate (beats/min) 109   Post-treatment Resp Rate (breaths/min) 20   All Fields Breath Sounds aeration increased

## 2017-03-24 PROBLEM — D63.8 ANEMIA OF CHRONIC DISEASE: Status: ACTIVE | Noted: 2017-01-01

## 2017-03-24 PROBLEM — D69.6 THROMBOCYTOPENIA: Status: ACTIVE | Noted: 2017-01-01

## 2017-03-24 NOTE — ASSESSMENT & PLAN NOTE
Latest electrolyte panel reviewed BMP  Lab Results   Component Value Date     03/24/2017    K 2.9 (L) 03/24/2017    CL 97 03/24/2017    CO2 34 (H) 03/24/2017    BUN 3 (L) 03/24/2017    CREATININE 0.4 (L) 03/24/2017    CALCIUM 8.4 (L) 03/24/2017    ANIONGAP 8 03/24/2017    ESTGFRAFRICA >60 03/24/2017    EGFRNONAA >60 03/24/2017   - Will replace and continue to monitor daily electrolytes.

## 2017-03-24 NOTE — PROGRESS NOTES
[facility] Medicine  Progress Note    Patient Name: Kat Corley  MRN: [id number]  Patient Class: IP- Inpatient   Admission Date: 3/22/2017  Length of Stay: 2 days  Attending Physician: Shahrzad Plasencia MD  Primary Care Provider: NAMITA Guerin    Subjective:     Principal Problem:Acute on chronic respiratory failure with hypoxia and hypercapnia    HPI:  Ms. Corley is an unfortunate 47 yo  admitted to the services of hospital medicine via the ER for pancytopenia, hypomagnesemia and intractable pain 2 to metastatic breast CA.  Diagnosed in January of this year. C1 D12 of carboplatin/taxol. Has metastatic disease to liver, SQ tissue, spine and femur. Did receive one treatment of XRT for bone mets. Has pathological fx of L spine. Pt not receptive to hospice at this time. She came to the ED due to SOB. She currently smokes 2-3 cigarettes a day. She has been coughing clear phlegm since her recent discharge. She completed an oral course of 2 antibiotics.   Per admitting NP:  Presented with acute respiratory failure. Noted to have hypoxia, tachypnea and tachycardia. Recent history and ROS limited due to Bipap mask and SOB. no evidence of confusion. No famiily present. admitted to the ICU. empiric coverage for HCAP. Daily CXR. Consult pulmonary and oncology. Resume fentanyl patch with IV dilaudid for breakthrough. will check venous blood gas to evaluate for C02 retention. Poor prognosis.        Hospital Course:  Admitted to ICU. IV abx given possible CAP vs atelectasis. Bipap and supplemental O2 given for respiratory support. Pulmonology consulted.     Interval History: uneventful night. Pain is better controlled. Her breathing is better/ stable.     Review of Systems   Constitutional: Negative for chills and fever.   Respiratory: Positive for shortness of breath.    Cardiovascular: Negative for chest pain.   Gastrointestinal: Negative for abdominal pain.   Genitourinary: Negative for  dysuria.   Musculoskeletal: Positive for arthralgias.   Neurological: Positive for weakness.   Psychiatric/Behavioral: Negative for confusion.     Objective:     Vital Signs (Most Recent):  Temp: 98 °F (36.7 °C) (03/24/17 1630)  Pulse: 83 (03/24/17 1800)  Resp: 10 (03/24/17 1800)  BP: 107/70 (03/24/17 1800)  SpO2: (!) 93 % (03/24/17 1800) Vital Signs (24h Range):  Temp:  [97.6 °F (36.4 °C)-98 °F (36.7 °C)] 98 °F (36.7 °C)  Pulse:  [] 83  Resp:  [9-39] 10  SpO2:  [81 %-100 %] 93 %  BP: (102-139)/(63-82) 107/70     Weight: 64.9 kg (143 lb 1.3 oz)  Body mass index is 26.17 kg/(m^2).    Intake/Output Summary (Last 24 hours) at 03/24/17 1823  Last data filed at 03/24/17 1700   Gross per 24 hour   Intake          5720.83 ml   Output             4075 ml   Net          1645.83 ml      Physical Exam   Constitutional: Vital signs are normal. She appears well-developed and well-nourished. She is cooperative. No distress. Nasal cannula in place.   HENT:   Head: Normocephalic and atraumatic.   Eyes: Conjunctivae are normal.   R ptosis   Neck: Neck supple. No JVD present.   Cardiovascular: Normal rate, regular rhythm and normal heart sounds.    Pulmonary/Chest: Effort normal. No respiratory distress. She has wheezes.   insp and exp wheezes throughout. L>R.    Abdominal: Soft. Bowel sounds are normal. She exhibits no distension. There is no tenderness.   Musculoskeletal: She exhibits edema (mild. b/l LLEs).   Neurological: She is alert.   Psychiatric: She has a normal mood and affect. Her behavior is normal.   Vitals reviewed.      Significant Labs: All pertinent labs within the past 24 hours have been reviewed.    Significant Imaging: I have reviewed all pertinent imaging results/findings within the past 24 hours.    Assessment/Plan:      Multiple lesions of metastatic malignancy  Worsening. Currently under going chemo. Prognosis poor. Oncology consulted.      * Acute on chronic respiratory failure with hypoxia and  hypercapnia  2/2 to lung mets. Initial CXR showed improvement of infiltrates. CTA chest confirms infiltrates. Do not think her respiratory failure is 2/2 to PNA with lack of fever and sputum. Will defer abx tx to pulmonology who is following.    CXR today with worsening of LLL consolidation. Case discussed with Dr. Stratton. Pt is dying.        Hypomagnesemia  IV replacement ordered. Monitor closely.      Metastatic cancer to bone  Pain control.       Hypokalemia  Latest electrolyte panel reviewed BMP  Lab Results   Component Value Date     03/24/2017    K 2.9 (L) 03/24/2017    CL 97 03/24/2017    CO2 34 (H) 03/24/2017    BUN 3 (L) 03/24/2017    CREATININE 0.4 (L) 03/24/2017    CALCIUM 8.4 (L) 03/24/2017    ANIONGAP 8 03/24/2017    ESTGFRAFRICA >60 03/24/2017    EGFRNONAA >60 03/24/2017   - Will replace and continue to monitor daily electrolytes.        Anemia of chronic disease  H/H decreasing. 10.6-- 7.8. Likely 2/2 malignancy.   Current CBC reviewed-   Lab Results   Component Value Date    WBC 5.10 03/24/2017    HGB 7.8 (L) 03/24/2017    HCT 24.4 (L) 03/24/2017    MCV 84 03/24/2017    PLT 46 (L) 03/24/2017     Monitor serial CBC and transfuse if patient becomes hemodynamically unstable, symptomatic or H/H drops below 7/21.         Lymphangitic lung metastasis        Atelectasis of left lung        Demand ischemia  Cause of elevated troponin. Associated with severe lung mets with decreased O2 consumption. No further cardiac evaluation work up needed. Monitor on telemetry. Pt tachycardic. EKG ordered.       Thrombocytopenia  Chronic. Stable. Monitor daily.       VTE Risk Mitigation         Ordered     apixaban tablet 5 mg  2 times daily     Route:  Oral        03/23/17 0206     High Risk of VTE  Once      03/22/17 2350     Reason for No Pharmacological VTE Prophylaxis  Once      03/22/17 2350        Critical Care Time: 37  Critical secondary to Patient has a condition that poses threat to life and bodily  function: Severe Respiratory Distress      Critical care was time spent personally by me on the following activities: development of treatment plan with patient or surrogate and bedside caregivers, discussions with consultants, evaluation of patient's response to treatment, examination of patient, ordering and performing treatments and interventions, ordering and review of laboratory studies, ordering and review of radiographic studies, pulse oximetry, re-evaluation of patient's condition.  This critical care time did not overlap with that of any other provider or involve time for any procedures.    I had a long discussion with the patient about her prognosis. She is still resistant to palliative care. Pt will remain full code.     Shahrzad Plasencia MD  Department of Hospital Medicine   [facility]

## 2017-03-24 NOTE — PLAN OF CARE
03/24/17 0322   Patient Assessment/Suction   All Lung Fields Breath Sounds clear;equal bilaterally   PRE-TX-O2-ETCO2   O2 Device (Oxygen Therapy) High Flow nasal Cannula   Flow (L/min) 5   SpO2 95 %   Pulse Oximetry Type Continuous   $ Pulse Oximetry - Multiple Charge Pulse Oximetry - Multiple   Pulse 72   Resp (!) 22   Aerosol Therapy   $ Aerosol Therapy Charges Aerosol Treatment   Respiratory Treatment Status given   SVN/Inhaler Treatment Route mask   Patient Tolerance good   Post-Treatment   Post-treatment Heart Rate (beats/min) 79   Post-treatment Resp Rate (breaths/min) 20   All Fields Breath Sounds unchanged

## 2017-03-24 NOTE — ASSESSMENT & PLAN NOTE
Latest electrolyte panel reviewed BMP  Lab Results   Component Value Date     (L) 03/23/2017    K 3.1 (L) 03/23/2017    CL 92 (L) 03/23/2017    CO2 32 (H) 03/23/2017    BUN 5 (L) 03/23/2017    CREATININE 0.5 03/23/2017    CALCIUM 8.4 (L) 03/23/2017    ANIONGAP 10 03/23/2017    ESTGFRAFRICA >60 03/23/2017    EGFRNONAA >60 03/23/2017   - Will replace and continue to monitor daily electrolytes.

## 2017-03-24 NOTE — ASSESSMENT & PLAN NOTE
2/2 to lung mets. Initial CXR showed improvement of infiltrates. CTA chest confirms infiltrates. Do not think her respiratory failure is 2/2 to PNA with lack of fever and sputum. Will defer abx tx to pulmonology who is following.

## 2017-03-24 NOTE — ASSESSMENT & PLAN NOTE
Cause of elevated troponin. Associated with severe lung mets with decreased O2 consumption. No further cardiac evaluation work up needed. Monitor on telemetry. Pt tachycardic. EKG ordered.

## 2017-03-24 NOTE — ASSESSMENT & PLAN NOTE
H/H decreasing. 10.6-- 7.8. Likely 2/2 malignancy.   Current CBC reviewed-   Lab Results   Component Value Date    WBC 5.10 03/24/2017    HGB 7.8 (L) 03/24/2017    HCT 24.4 (L) 03/24/2017    MCV 84 03/24/2017    PLT 46 (L) 03/24/2017     Monitor serial CBC and transfuse if patient becomes hemodynamically unstable, symptomatic or H/H drops below 7/21.

## 2017-03-24 NOTE — ASSESSMENT & PLAN NOTE
Cause of elevated troponin. Associated with severe lung mets. No further cardiac evaluation work up needed. Monitor on telemetry.

## 2017-03-24 NOTE — PROGRESS NOTES
Progress Note  PULMONARY    Admit Date: 3/22/2017  3/24/2017        SUBJECTIVE:     March 24, pt states chemo not recommended by oncology.  Pain better, breathing not- but stable sob.      PFSH and Allergies reviewed.    OBJECTIVE:     Vitals (Most recent):  Vitals:    03/24/17 0845   BP:    Pulse: 101   Resp: 15   Temp:        Vitals (24 hour range):  Temp:  [97.5 °F (36.4 °C)-97.9 °F (36.6 °C)]   Pulse:  []   Resp:  [9-39]   BP: (104-140)/(62-82)   SpO2:  [81 %-100 %]       Intake/Output Summary (Last 24 hours) at 03/24/17 0916  Last data filed at 03/24/17 0432   Gross per 24 hour   Intake          5230.83 ml   Output             2575 ml   Net          2655.83 ml          Physical Exam:  The patient's neuro status (alertness,orientation,cognitive function,motor skills,), pharyngeal exam (oral lesions, hygiene, abn dentition,), Neck (jvd,mass,thyroid,nodes in neck and above/below clavicle),RESPIRATORY(symmetry,effort,fremitus,percussion,auscultation),  Cor(rhythm,heart tones including gallops,perfusion,edema)ABD(distention,hepatic&splenomegaly,tenderness,masses), Skin(rash,cyanosis),Psyc(affect,judgement,).  Exam negative except for these pertinent findings:    Sedated, no distress, lying in bed with min movement, shallow respiration, no cough or distress, min rales, no edema,     Diagnostic Results:     cxr worse looking left lung.      Results for AMANDA RAE (MRN [id number]) as of 3/24/2017 09:15   Ref. Range 3/24/2017 04:07   WBC Latest Ref Range: 3.90 - 12.70 K/uL 5.10   RBC Latest Ref Range: 4.00 - 5.40 M/uL 2.90 (L)   Hemoglobin Latest Ref Range: 12.0 - 16.0 g/dL 7.8 (L)   Hematocrit Latest Ref Range: 37.0 - 48.5 % 24.4 (L)   MCV Latest Ref Range: 82 - 98 fL 84   MCH Latest Ref Range: 27.0 - 31.0 pg 27.0   MCHC Latest Ref Range: 32.0 - 36.0 % 32.0   RDW Latest Ref Range: 11.5 - 14.5 % 22.0 (H)   Platelets Latest Ref Range: 150 - 350 K/uL 46 (L)   MPV Latest Ref Range: 9.2 - 12.9 fL 9.4   Gran% Latest  Ref Range: 38.0 - 73.0 % 89.3 (H)   Gran # Latest Ref Range: 1.8 - 7.7 K/uL 4.5   Lymph% Latest Ref Range: 18.0 - 48.0 % 7.9 (L)   Lymph # Latest Ref Range: 1.0 - 4.8 K/uL 0.4 (L)   Mono% Latest Ref Range: 4.0 - 15.0 % 2.3 (L)   Mono # Latest Ref Range: 0.3 - 1.0 K/uL 0.1 (L)   Eosinophil% Latest Ref Range: 0.0 - 8.0 % 0.0   Eos # Latest Ref Range: 0.0 - 0.5 K/uL 0.0   Basophil% Latest Ref Range: 0.0 - 1.9 % 0.5   Baso # Latest Ref Range: 0.00 - 0.20 K/uL 0.00   Sodium Latest Ref Range: 136 - 145 mmol/L 139   Potassium Latest Ref Range: 3.5 - 5.1 mmol/L 2.9 (L)   Chloride Latest Ref Range: 95 - 110 mmol/L 97   CO2 Latest Ref Range: 23 - 29 mmol/L 34 (H)   Anion Gap Latest Ref Range: 8 - 16 mmol/L 8   BUN, Bld Latest Ref Range: 6 - 20 mg/dL 3 (L)   Creatinine Latest Ref Range: 0.5 - 1.4 mg/dL 0.4 (L)   eGFR if non African American Latest Ref Range: >60 mL/min/1.73 m^2 >60   eGFR if  Latest Ref Range: >60 mL/min/1.73 m^2 >60   Glucose Latest Ref Range: 70 - 110 mg/dL 135 (H)   Calcium Latest Ref Range: 8.7 - 10.5 mg/dL 8.4 (L)             ASSESSMENT/PLAN:     Patient Active Problem List   Diagnosis    Hyperbilirubinemia    Elevated liver enzymes    Multiple lesions of metastatic malignancy    Elevated serum alkaline phosphatase level    Hypomagnesemia    COPD (chronic obstructive pulmonary disease)    Cigarette smoker    Metastatic cancer to bone    Liver function abnormality    Obstructive jaundice    Hypercalcemia of malignancy    Cancer    Weakness    Metastatic breast cancer    Hypokalemia    Severe protein-calorie malnutrition    Vertebral compression fracture    History of pulmonary embolism, February 2017    Anemia    Chemotherapy-induced neutropenia    Drug-induced pancytopenia    Pain due to malignant neoplasm metastatic to bone    CAP (community acquired pneumonia)    Acute on chronic respiratory failure with hypoxia and hypercapnia    Lymphangitic lung metastasis     Atelectasis of left lung    Demand ischemia       Active Hospital Problems    Diagnosis  POA    *Acute on chronic respiratory failure with hypoxia and hypercapnia [J96.21, J96.22]  Yes    Lymphangitic lung metastasis [C78.00]  Yes    Atelectasis of left lung [J98.11]  Yes    Demand ischemia [I24.8]  Yes    Hypokalemia [E87.6]  Yes     - replace      Metastatic cancer to bone [C79.51]  Yes    Multiple lesions of metastatic malignancy [C79.9]  Yes      Resolved Hospital Problems    Diagnosis Date Resolved POA   No resolved problems to display.         March 24, explained again today that bulk of respiratory issues look to be from diffuse lung nodules seen growing on ct since feb and nodules expected to be breast cancer.  Explained life support would not help and if chemo not available- as she reports- comfort care needs emphasis.    Expect will worsen.  abx might treat left lower lung pneumonia- but may have mainly  Atelectasis and could have airway dz.  Lymphangitic disease major resp issue.    Pt seems to understand.        Critical Care  - THE PATIENT HAS A HIGH PROBABILITY OF IMMINENT OR LIFE THREATENING DETERIORATION.  Over 50%time of encounter was in direct care at bedside.  Time was 30 to 74 minutes required for patient care.  Time needed for all of the above totaled 38 minutes.                                        .

## 2017-03-24 NOTE — ASSESSMENT & PLAN NOTE
2/2 to lung mets. Initial CXR showed improvement of infiltrates. CTA chest confirms infiltrates. Do not think her respiratory failure is 2/2 to PNA with lack of fever and sputum. Will defer abx tx to pulmonology who is following.    CXR today with worsening of LLL consolidation. Case discussed with Dr. Stratton. Pt is dying.

## 2017-03-24 NOTE — SUBJECTIVE & OBJECTIVE
Interval History: uneventful night. Pain is better controlled. Her breathing is better/ stable.     Review of Systems   Constitutional: Negative for chills and fever.   Respiratory: Positive for shortness of breath.    Cardiovascular: Negative for chest pain.   Gastrointestinal: Negative for abdominal pain.   Genitourinary: Negative for dysuria.   Musculoskeletal: Positive for arthralgias.   Neurological: Positive for weakness.   Psychiatric/Behavioral: Negative for confusion.     Objective:     Vital Signs (Most Recent):  Temp: 98 °F (36.7 °C) (03/24/17 1630)  Pulse: 83 (03/24/17 1800)  Resp: 10 (03/24/17 1800)  BP: 107/70 (03/24/17 1800)  SpO2: (!) 93 % (03/24/17 1800) Vital Signs (24h Range):  Temp:  [97.6 °F (36.4 °C)-98 °F (36.7 °C)] 98 °F (36.7 °C)  Pulse:  [] 83  Resp:  [9-39] 10  SpO2:  [81 %-100 %] 93 %  BP: (102-139)/(63-82) 107/70     Weight: 64.9 kg (143 lb 1.3 oz)  Body mass index is 26.17 kg/(m^2).    Intake/Output Summary (Last 24 hours) at 03/24/17 1823  Last data filed at 03/24/17 1700   Gross per 24 hour   Intake          5720.83 ml   Output             4075 ml   Net          1645.83 ml      Physical Exam   Constitutional: Vital signs are normal. She appears well-developed and well-nourished. She is cooperative. No distress. Nasal cannula in place.   HENT:   Head: Normocephalic and atraumatic.   Eyes: Conjunctivae are normal.   R ptosis   Neck: Neck supple. No JVD present.   Cardiovascular: Normal rate, regular rhythm and normal heart sounds.    Pulmonary/Chest: Effort normal. No respiratory distress. She has wheezes.   insp and exp wheezes throughout. L>R.    Abdominal: Soft. Bowel sounds are normal. She exhibits no distension. There is no tenderness.   Musculoskeletal: She exhibits edema (mild. b/l LLEs).   Neurological: She is alert.   Psychiatric: She has a normal mood and affect. Her behavior is normal.   Vitals reviewed.      Significant Labs: All pertinent labs within the past 24 hours  have been reviewed.    Significant Imaging: I have reviewed all pertinent imaging results/findings within the past 24 hours.

## 2017-03-24 NOTE — SUBJECTIVE & OBJECTIVE
Interval History: Pt c/o uncontrolled pain. Otherwise, no other complaints. Her breathing is better.     Review of Systems   Constitutional: Negative for chills and fever.   Respiratory: Positive for shortness of breath.    Cardiovascular: Negative for chest pain.   Gastrointestinal: Negative for abdominal pain.   Genitourinary: Negative for dysuria.   Musculoskeletal: Positive for arthralgias.   Neurological: Positive for weakness.   Psychiatric/Behavioral: Negative for confusion.     Objective:     Vital Signs (Most Recent):  Temp: 97.6 °F (36.4 °C) (03/23/17 1905)  Pulse: 81 (03/23/17 1945)  Resp: 19 (03/23/17 1945)  BP: 139/82 (03/23/17 1945)  SpO2: (!) 93 % (03/23/17 1945) Vital Signs (24h Range):  Temp:  [97.5 °F (36.4 °C)-98.1 °F (36.7 °C)] 97.6 °F (36.4 °C)  Pulse:  [] 81  Resp:  [19-24] 19  SpO2:  [87 %-100 %] 93 %  BP: (100-140)/(62-82) 139/82     Weight: 66.2 kg (145 lb 15.1 oz)  Body mass index is 26.69 kg/(m^2).    Intake/Output Summary (Last 24 hours) at 03/23/17 2305  Last data filed at 03/23/17 1838   Gross per 24 hour   Intake          3453.33 ml   Output             2295 ml   Net          1158.33 ml      Physical Exam   Constitutional: Vital signs are normal. She appears well-developed and well-nourished. She is cooperative. No distress. Nasal cannula in place.   HENT:   Head: Normocephalic and atraumatic.   Eyes: Conjunctivae are normal.   R ptosis   Neck: Neck supple. No JVD present.   Cardiovascular: Normal rate, regular rhythm and normal heart sounds.    Pulmonary/Chest: Effort normal. No respiratory distress. She has wheezes.   insp and exp wheezes throughout. L>R.    Abdominal: Soft. Bowel sounds are normal. She exhibits no distension. There is no tenderness.   Musculoskeletal: She exhibits no edema.   Neurological: She is alert.   Psychiatric: She has a normal mood and affect. Her behavior is normal.   Vitals reviewed.      Significant Labs: All pertinent labs within the past 24 hours  have been reviewed.    Significant Imaging: I have reviewed all pertinent imaging results/findings within the past 24 hours.

## 2017-03-24 NOTE — PROGRESS NOTES
Pt O2 sat noted drop on monitor at NS from 94% to high 60-70% on 5L HFNC. Upon entering room pt was noted to be sleeping w/ NC not in nares. Pt was awoken, NC was placed back in nares, and O2 was increased. No significant improvement in O2 sat noted. Pt placed back on bipap 12/6 at 50%. O2 sats improving. Pt does not appear in any acute distress, will continue to monitor her closely.    Sunday Irving RN

## 2017-03-24 NOTE — CONSULTS
Kat Corley [id number] is a 46 y.o. female who has been consulted for vancomycin dosing.    The patient has the following labs:     Date Creatinine (mg/dl)    BUN WBC Count   3/24/2017 Estimated Creatinine Clearance: 158.7 mL/min (based on Cr of 0.4). Lab Results   Component Value Date    BUN 3 (L) 03/24/2017     Lab Results   Component Value Date    WBC 5.10 03/24/2017        Current weight is 64.9 kg (143 lb 1.3 oz)    Pt is receiving vancomycin 1000 mg every 8 hours.  Vancomycin trough from 03/24 at 1118 was 18.9 mg/dL. The patient will be continued on a vancomycin dose of 1000 mg every 8 hours. A vancomycin trough has been ordered prior to the dose due 03/25 at 1030.      Patient will be followed by pharmacy for changes in renal function, toxicity, and efficacy.  Thank you for allowing us to participate in this patient's care.     Yoko Siddiqui

## 2017-03-24 NOTE — PROGRESS NOTES
[facility] Medicine  Progress Note    Patient Name: Kat Corley  MRN: [id number]  Patient Class: IP- Inpatient   Admission Date: 3/22/2017  Length of Stay: 1 days  Attending Physician: Shahrzad Plasencia MD  Primary Care Provider: NAMITA Guerin    Subjective:     Principal Problem:Acute on chronic respiratory failure with hypoxia and hypercapnia    HPI:  Ms. Corley is an unfortunate 45 yo  admitted to the services of hospital medicine via the ER for pancytopenia, hypomagnesemia and intractable pain 2 to metastatic breast CA.  Diagnosed in January of this year. C1 D12 of carboplatin/taxol. Has metastatic disease to liver, SQ tissue, spine and femur. Did receive one treatment of XRT for bone mets. Has pathological fx of L spine. Pt not receptive to hospice at this time. She came to the ED due to SOB. She currently smokes 2-3 cigarettes a day. She has been coughing clear phlegm since her recent discharge. She completed an oral course of 2 antibiotics.   Per admitting NP:  Presented with acute respiratory failure. Noted to have hypoxia, tachypnea and tachycardia. Recent history and ROS limited due to Bipap mask and SOB. no evidence of confusion. No famiily present. admitted to the ICU. empiric coverage for HCAP. Daily CXR. Consult pulmonary and oncology. Resume fentanyl patch with IV dilaudid for breakthrough. will check venous blood gas to evaluate for C02 retention. Poor prognosis.        Interval History: Pt c/o uncontrolled pain. Otherwise, no other complaints. Her breathing is better.     Review of Systems   Constitutional: Negative for chills and fever.   Respiratory: Positive for shortness of breath.    Cardiovascular: Negative for chest pain.   Gastrointestinal: Negative for abdominal pain.   Genitourinary: Negative for dysuria.   Musculoskeletal: Positive for arthralgias.   Neurological: Positive for weakness.   Psychiatric/Behavioral: Negative for confusion.     Objective:      Vital Signs (Most Recent):  Temp: 97.6 °F (36.4 °C) (03/23/17 1905)  Pulse: 81 (03/23/17 1945)  Resp: 19 (03/23/17 1945)  BP: 139/82 (03/23/17 1945)  SpO2: (!) 93 % (03/23/17 1945) Vital Signs (24h Range):  Temp:  [97.5 °F (36.4 °C)-98.1 °F (36.7 °C)] 97.6 °F (36.4 °C)  Pulse:  [] 81  Resp:  [19-24] 19  SpO2:  [87 %-100 %] 93 %  BP: (100-140)/(62-82) 139/82     Weight: 66.2 kg (145 lb 15.1 oz)  Body mass index is 26.69 kg/(m^2).    Intake/Output Summary (Last 24 hours) at 03/23/17 2305  Last data filed at 03/23/17 1838   Gross per 24 hour   Intake          3453.33 ml   Output             2295 ml   Net          1158.33 ml      Physical Exam   Constitutional: Vital signs are normal. She appears well-developed and well-nourished. She is cooperative. No distress. Nasal cannula in place.   HENT:   Head: Normocephalic and atraumatic.   Eyes: Conjunctivae are normal.   R ptosis   Neck: Neck supple. No JVD present.   Cardiovascular: Normal rate, regular rhythm and normal heart sounds.    Pulmonary/Chest: Effort normal. No respiratory distress. She has wheezes.   insp and exp wheezes throughout. L>R.    Abdominal: Soft. Bowel sounds are normal. She exhibits no distension. There is no tenderness.   Musculoskeletal: She exhibits no edema.   Neurological: She is alert.   Psychiatric: She has a normal mood and affect. Her behavior is normal.   Vitals reviewed.      Significant Labs: All pertinent labs within the past 24 hours have been reviewed.    Significant Imaging: I have reviewed all pertinent imaging results/findings within the past 24 hours.    Assessment/Plan:      Multiple lesions of metastatic malignancy  Worsening. Currently under going chemo. Prognosis poor. Oncology consulted.      * Acute on chronic respiratory failure with hypoxia and hypercapnia  2/2 to lung mets. Initial CXR showed improvement of infiltrates. CTA chest confirms infiltrates. Do not think her respiratory failure is 2/2 to PNA with lack  of fever and sputum. Will defer abx tx to pulmonology who is following.       Metastatic cancer to bone  Pain control.       Hypokalemia  Latest electrolyte panel reviewed BMP  Lab Results   Component Value Date     (L) 03/23/2017    K 3.1 (L) 03/23/2017    CL 92 (L) 03/23/2017    CO2 32 (H) 03/23/2017    BUN 5 (L) 03/23/2017    CREATININE 0.5 03/23/2017    CALCIUM 8.4 (L) 03/23/2017    ANIONGAP 10 03/23/2017    ESTGFRAFRICA >60 03/23/2017    EGFRNONAA >60 03/23/2017   - Will replace and continue to monitor daily electrolytes.        Lymphangitic lung metastasis        Atelectasis of left lung        Demand ischemia  Cause of elevated troponin. Associated with severe lung mets. No further cardiac evaluation work up needed. Monitor on telemetry.       VTE Risk Mitigation         Ordered     apixaban tablet 5 mg  2 times daily     Route:  Oral        03/23/17 0206     High Risk of VTE  Once      03/22/17 2350     Reason for No Pharmacological VTE Prophylaxis  Once      03/22/17 2350        Critical Care Time: 43  Critical secondary to Patient has a condition that poses threat to life and bodily function: Severe Respiratory Distress      Critical care was time spent personally by me on the following activities: development of treatment plan with patient or surrogate and bedside caregivers, discussions with consultants, evaluation of patient's response to treatment, examination of patient, ordering and performing treatments and interventions, ordering and review of laboratory studies, ordering and review of radiographic studies, pulse oximetry, re-evaluation of patient's condition.  This critical care time did not overlap with that of any other provider or involve time for any procedures.      Shahrzad Plasencia MD  Department of Hospital Medicine   [facility]

## 2017-03-25 NOTE — PROGRESS NOTES
[facility] Medicine  Progress Note    Patient Name: Kat Corley  MRN: [id number]  Patient Class: IP- Inpatient   Admission Date: 3/22/2017  Length of Stay: 3 days  Attending Physician: Shahrzad Plasencia MD  Primary Care Provider: NAMITA Guerin    Subjective:     Principal Problem:Acute on chronic respiratory failure with hypoxia and hypercapnia    HPI:  Ms. Corley is an unfortunate 47 yo  admitted to the services of hospital medicine via the ER for pancytopenia, hypomagnesemia and intractable pain 2 to metastatic breast CA.  Diagnosed in January of this year. C1 D12 of carboplatin/taxol. Has metastatic disease to liver, SQ tissue, spine and femur. Did receive one treatment of XRT for bone mets. Has pathological fx of L spine. Pt not receptive to hospice at this time. She came to the ED due to SOB. She currently smokes 2-3 cigarettes a day. She has been coughing clear phlegm since her recent discharge. She completed an oral course of 2 antibiotics.   Per admitting NP:  Presented with acute respiratory failure. Noted to have hypoxia, tachypnea and tachycardia. Recent history and ROS limited due to Bipap mask and SOB. no evidence of confusion. No famiily present. admitted to the ICU. empiric coverage for HCAP. Daily CXR. Consult pulmonary and oncology. Resume fentanyl patch with IV dilaudid for breakthrough. will check venous blood gas to evaluate for C02 retention. Poor prognosis.        Hospital Course:  Admitted to ICU. IV abx given possible CAP vs atelectasis. Bipap and supplemental O2 given for respiratory support. Pulmonology consulted.     Interval History: uneventful night. Pain is better controlled. Her breathing is stable. She is interested in discussing hospice.       Review of Systems   Constitutional: Negative for chills and fever.   Respiratory: Negative for shortness of breath.    Cardiovascular: Negative for chest pain.   Gastrointestinal: Negative for abdominal pain.    Genitourinary: Negative for dysuria.   Musculoskeletal: Positive for arthralgias.   Neurological: Positive for weakness.   Psychiatric/Behavioral: Negative for confusion.     Objective:     Vital Signs (Most Recent):  Temp: 97.9 °F (36.6 °C) (03/25/17 1130)  Pulse: (!) 114 (03/25/17 1152)  Resp: 17 (03/25/17 1152)  BP: (!) 133/101 (03/25/17 1145)  SpO2: 99 % (03/25/17 1152) Vital Signs (24h Range):  Temp:  [97.9 °F (36.6 °C)-98.3 °F (36.8 °C)] 97.9 °F (36.6 °C)  Pulse:  [] 114  Resp:  [9-29] 17  SpO2:  [90 %-100 %] 99 %  BP: (107-155)/() 133/101     Weight: 64.9 kg (143 lb 1.3 oz)  Body mass index is 26.17 kg/(m^2).    Intake/Output Summary (Last 24 hours) at 03/25/17 1435  Last data filed at 03/25/17 0500   Gross per 24 hour   Intake          3828.33 ml   Output             3420 ml   Net           408.33 ml      Physical Exam   Constitutional: Vital signs are normal. She appears well-developed and well-nourished. She is cooperative. No distress. Nasal cannula in place.   HENT:   Head: Normocephalic and atraumatic.   Eyes: Conjunctivae are normal.   R ptosis   Neck: Neck supple. No JVD present.   Cardiovascular: Regular rhythm and normal heart sounds.  Tachycardia present.    Pulmonary/Chest: Effort normal. No respiratory distress. She has rales.   Abdominal: Soft. Bowel sounds are normal. She exhibits no distension. There is no tenderness.   Musculoskeletal: She exhibits edema (b/l LLEs).   Neurological: She is alert.   Psychiatric: She has a normal mood and affect. Her behavior is normal.   Vitals reviewed.      Significant Labs: All pertinent labs within the past 24 hours have been reviewed.    Significant Imaging: I have reviewed all pertinent imaging results/findings within the past 24 hours.    Assessment/Plan:      Multiple lesions of metastatic malignancy  Worsening. Currently under going chemo. Prognosis poor. Oncology recommends hospice.      * Acute on chronic respiratory failure with  hypoxia and hypercapnia  2/2 to lung mets. Initial CXR showed improvement of infiltrates. CTA chest confirms infiltrates. Do not think her respiratory failure is 2/2 to PNA with lack of fever and sputum. Will defer abx tx to pulmonology who is following.    CXR today stable.       Hypomagnesemia  Stable. Oral replacement daily.      Metastatic cancer to bone  Pain control.       Hypokalemia  Latest electrolyte panel reviewed BMP  Lab Results   Component Value Date     03/25/2017    K 3.4 (L) 03/25/2017    CL 97 03/25/2017    CO2 33 (H) 03/25/2017    BUN 4 (L) 03/25/2017    CREATININE 0.5 03/25/2017    CALCIUM 8.8 03/25/2017    ANIONGAP 9 03/25/2017    ESTGFRAFRICA >60 03/25/2017    EGFRNONAA >60 03/25/2017   - Will replace and continue to monitor daily electrolytes.        Anemia of chronic disease  H/H decreasing. 10.6-- 7.8. Likely 2/2 malignancy.   Current CBC reviewed-   Lab Results   Component Value Date    WBC 4.90 03/25/2017    HGB 7.7 (L) 03/25/2017    HCT 24.6 (L) 03/25/2017    MCV 85 03/25/2017    PLT 73 (L) 03/25/2017     Monitor serial CBC and transfuse if patient becomes hemodynamically unstable, symptomatic or H/H drops below 7/21.         Lymphangitic lung metastasis        Atelectasis of left lung        Demand ischemia  Cause of elevated troponin. Associated with severe lung mets with decreased O2 consumption. No further cardiac evaluation work up needed. Monitor on telemetry.       Thrombocytopenia  Chronic. Stable. Monitor daily.       VTE Risk Mitigation         Ordered     apixaban tablet 5 mg  2 times daily     Route:  Oral        03/23/17 0206     High Risk of VTE  Once      03/22/17 2350     Reason for No Pharmacological VTE Prophylaxis  Once      03/22/17 2350        Critical Care Time: 33  Critical secondary to Patient has a condition that poses threat to life and bodily function: Severe Respiratory Distress      Critical care was time spent personally by me on the following  activities: development of treatment plan with patient or surrogate and bedside caregivers, discussions with consultants, evaluation of patient's response to treatment, examination of patient, ordering and performing treatments and interventions, ordering and review of laboratory studies, ordering and review of radiographic studies, pulse oximetry, re-evaluation of patient's condition.  This critical care time did not overlap with that of any other provider or involve time for any procedures.    Pt elects to remain full code at this time. Hospice consulted for consultation.     Shahrzad Plasencia MD  Department of Hospital Medicine   [facility]

## 2017-03-25 NOTE — CONSULTS
Kat Corley [id number] is a 46 y.o. female who has been consulted for vancomycin dosing.    The patient has the following labs:     Date Creatinine (mg/dl)    BUN WBC Count   3/25/2017 Estimated Creatinine Clearance: 126.9 mL/min (based on Cr of 0.5). Lab Results   Component Value Date    BUN 4 (L) 03/25/2017     Lab Results   Component Value Date    WBC 4.90 03/25/2017        Current weight is 64.9 kg (143 lb 1.3 oz)  Vancomycin level 03/25@1209 was 35.3.  Based on the MAR, there is a probability that level was drawn while medication was infusing.  However, vancomycin is on hold for now.  A repeat vancomycin level has been ordered for 03/25@ 1930 and dose will be adjusted from there.    Patient will be followed by pharmacy for changes in renal function, toxicity, and efficacy.  Thank you for allowing us to participate in this patient's care.     Yoko Siddiqui

## 2017-03-25 NOTE — ASSESSMENT & PLAN NOTE
Latest electrolyte panel reviewed BMP  Lab Results   Component Value Date     03/25/2017    K 3.4 (L) 03/25/2017    CL 97 03/25/2017    CO2 33 (H) 03/25/2017    BUN 4 (L) 03/25/2017    CREATININE 0.5 03/25/2017    CALCIUM 8.8 03/25/2017    ANIONGAP 9 03/25/2017    ESTGFRAFRICA >60 03/25/2017    EGFRNONAA >60 03/25/2017   - Will replace and continue to monitor daily electrolytes.

## 2017-03-25 NOTE — ASSESSMENT & PLAN NOTE
2/2 to lung mets. Initial CXR showed improvement of infiltrates. CTA chest confirms infiltrates. Do not think her respiratory failure is 2/2 to PNA with lack of fever and sputum. Will defer abx tx to pulmonology who is following.    CXR today stable.

## 2017-03-25 NOTE — PROGRESS NOTES
Progress Note  Pul-ICU    Admit Date: 3/22/2017   LOS: 3 days     SUBJECTIVE:     Follow-up For:      Metastatic breast ca  Dyspnea  Malnutrition hypoalbuminemia  Low platelet  anemia    Continuous Infusions:   sodium chloride 0.9% 125 mL/hr at 03/25/17 0759     Scheduled Meds:   albuterol-ipratropium 2.5mg-0.5mg/3mL  3 mL Nebulization Q4H    amitriptyline  50 mg Oral QHS    apixaban  5 mg Oral BID    ciprofloxacin  400 mg Intravenous Q12H    dexamethasone  8 mg Oral Q12H    fentaNYL  1 patch Transdermal Q72H    gabapentin  300 mg Oral TID    magnesium oxide  400 mg Oral BID    morphine  30 mg Oral BID    pantoprazole  40 mg Intravenous Daily    piperacillin-tazobactam (ZOSYN) IVPB  3.375 g Intravenous Q6H     PRN Meds:acetaminophen, albuterol sulfate, HYDROmorphone, oxycodone, pneumoc 13-shreya conj-dip cr(PF), promethazine (PHENERGAN) IVPB    Review of patient's allergies indicates:  No Known Allergies    OBJECTIVE:     Vital Signs (Most Recent)  Temp: 97.9 °F (36.6 °C) (03/25/17 1130)  Pulse: (!) 114 (03/25/17 1152)  Resp: 17 (03/25/17 1152)  BP: (!) 133/101 (03/25/17 1145)  SpO2: 99 % (03/25/17 1152)    Vital Signs Range (Last 24H):  Temp:  [97.9 °F (36.6 °C)-98.3 °F (36.8 °C)]   Pulse:  []   Resp:  [9-29]   BP: (107-155)/()   SpO2:  [90 %-100 %]     I & O (Last 24H):  Intake/Output Summary (Last 24 hours) at 03/25/17 1407  Last data filed at 03/25/17 0500   Gross per 24 hour   Intake          3828.33 ml   Output             3420 ml   Net           408.33 ml     Ventilator Data (Last 24H):     Oxygen Concentration (%):  [50] 50    Hemodynamic Parameters (Last 24H):       Constitutional: negative for fevers  ENT: negative for epistaxis  Respiratory: negative for hemoptysis  Cardiovascular: negative for palpitations  Gastrointestinal: negative for bright red blood per rectum  Genitourinary: negative for hematuria  Hematologic/Lymphatic: negative for bleeding  Allergy/Immunology: neg for  "hives  Neurological: negative for seizures    Physical Exam:  General: well developed, appears older than stated age, no distress  Head: normocephalic, atraumatic  Eyes:  negative findings: conjunctivae and sclerae normal, positive findings: ptyosis  Nose: no epistaxis  Throat: moist  Neck: no JVD  Lungs:  rhonchi bilaterally and mild  Chest Wall: no tenderness  Heart: no gallop  Abdomen: normal findings: soft, non-tender  Extremities: edema mild  Skin: mobility and turgor normal  Neurologic: Mental status: alertness: alert    Lines/Drains:       Port A Cath Single Lumen 02/15/17 1400 other (see comments) (Active)   Accessed by: Francy 3/25/2017 11:24 AM   Dressing Type Transparent 3/25/2017 11:24 AM   Dressing Status Clean;Dry;Intact 3/25/2017 11:24 AM   Dressing Change Due 03/29/17 3/25/2017 11:24 AM   Line Status Infusing 3/25/2017 11:24 AM   Flush Performed Yes 3/25/2017  4:36 AM   Daily Line Review Performed 3/25/2017  4:36 AM   Type of Needle Romero 3/24/2017  3:15 AM   Gauge 20 3/23/2017 12:00 PM   Needle Length 1 in 3/23/2017 12:00 PM   Needle Status Left in place 3/25/2017  4:36 AM   Needle Insertion Date 03/22/17 3/23/2017 12:00 PM   Needle Insertion Time 2117 3/22/2017  9:17 PM   Number of days:38            Urethral Catheter 03/23/17 0010 Non-latex 16 Fr. (Active)   Site Assessment Clean;Intact 3/25/2017 11:24 AM   Collection Container Urimeter 3/25/2017 11:24 AM   Securement Method secured to top of thigh w/ adhesive device 3/25/2017 11:24 AM   Catheter Care Performed yes 3/25/2017 11:24 AM   Reason for Continuing Urinary Catheterization Critically ill in ICU requiring intensive monitoring 3/25/2017 11:24 AM   CAUTI Prevention Bundle StatLock in place w 1" slack;Intact seal between catheter & drainage tubing;Drainage bag off the floor;Green sheeting clip in use;No dependent loops or kinks;Drainage bag not overfilled (<2/3 full);Drainage bag below bladder 3/25/2017  7:30 AM   Output (mL) 320 mL 3/25/2017  " 5:00 AM   Number of days:2       Laboratory (Last 24H):  CBC:    Recent Labs  Lab 03/25/17  0346   WBC 4.90   HGB 7.7*   HCT 24.6*   PLT 73*     CMP:    Recent Labs  Lab 03/25/17  0346   CALCIUM 8.8   ALBUMIN 1.5*      K 3.4*   CO2 33*   CL 97   BUN 4*   CREATININE 0.5     BMP:   Recent Labs  Lab 03/25/17  0346   *      K 3.4*   CL 97   CO2 33*   BUN 4*   CREATININE 0.5   CALCIUM 8.8   MG 1.5*     Coagulation: No results for input(s): INR, APTT in the last 168 hours.    Invalid input(s): PT  Cardiac Markers: No results for input(s): CKMB, TROPONINT, MYOGLOBIN in the last 168 hours.  ABGs:   Recent Labs  Lab 03/23/17  0103   PH 7.457*   PCO2 48.1*   HCO3 34.0*   POCSATURATED 79*   BE 10     Prealbumin:   Recent Labs  Lab 03/23/17  0351   PREALBUMIN 8*     Microbiology Results (last 7 days)     Procedure Component Value Units Date/Time    Blood culture [[id number]] Collected:  03/22/17 2202    Order Status:  Completed Specimen:  Blood Updated:  03/25/17 0612     Blood Culture, Routine No Growth to date     Blood Culture, Routine No Growth to date     Blood Culture, Routine No Growth to date    Narrative:       kanwal bryant oscar from [place]    Blood culture [[id number]] Collected:  03/22/17 1936    Order Status:  Completed Specimen:  Blood Updated:  03/25/17 0612     Blood Culture, Routine No Growth to date     Blood Culture, Routine No Growth to date     Blood Culture, Routine No Growth to date    Urine culture [[id number]] Collected:  03/23/17 0300    Order Status:  Completed Specimen:  Urine from Urine, Catheterized Updated:  03/24/17 1343     Urine Culture, Routine No growth        Labs within the past 24 hours have been reviewed.    Chest X-Ray: I personally reviewed the films and findings are:, mass and lymphangitic spread    Diagnostic Results:      ASSESSMENT/PLAN:     Preventive Measures:     Patient Active Problem List    Diagnosis Date Noted    Thrombocytopenia 03/24/2017    Lymphangitic  lung metastasis 03/23/2017    Atelectasis of left lung 03/23/2017    Demand ischemia 03/23/2017    Acute on chronic respiratory failure with hypoxia and hypercapnia 03/22/2017    CAP (community acquired pneumonia) 03/13/2017    Chemotherapy-induced neutropenia 03/11/2017    Drug-induced pancytopenia 03/11/2017    Pain due to malignant neoplasm metastatic to bone 03/11/2017    History of pulmonary embolism, February 2017 02/15/2017    Anemia of chronic disease 02/15/2017    Vertebral compression fracture 02/13/2017    Severe protein-calorie malnutrition 02/08/2017    Hypokalemia 02/07/2017    Metastatic breast cancer 02/06/2017    Weakness 02/03/2017    Cancer 02/02/2017    Hypercalcemia of malignancy 01/23/2017    Obstructive jaundice     Metastatic cancer to bone 01/11/2017    Liver function abnormality 01/11/2017    Multiple lesions of metastatic malignancy 01/10/2017    Elevated serum alkaline phosphatase level 01/10/2017    Hypomagnesemia 01/10/2017    COPD (chronic obstructive pulmonary disease) 01/10/2017    Cigarette smoker 01/10/2017    Hyperbilirubinemia 01/09/2017    Elevated liver enzymes 01/09/2017         Plan:  Pulmonary: supportive RX,discussued end stage disease with pt and she understands terminal nature of her cancer.   She wants no artifical life support and does not want to have a suffering death.  she does not want any machines to keep her alive nor wants her chest broken when her heart stops.  I will abide by her wishes and place a DNR order.    Counseling/Consultation:    Critical Care Time greater than: 45 Minutes

## 2017-03-25 NOTE — ASSESSMENT & PLAN NOTE
H/H decreasing. 10.6-- 7.8. Likely 2/2 malignancy.   Current CBC reviewed-   Lab Results   Component Value Date    WBC 4.90 03/25/2017    HGB 7.7 (L) 03/25/2017    HCT 24.6 (L) 03/25/2017    MCV 85 03/25/2017    PLT 73 (L) 03/25/2017     Monitor serial CBC and transfuse if patient becomes hemodynamically unstable, symptomatic or H/H drops below 7/21.

## 2017-03-25 NOTE — PROGRESS NOTES
03/24/17 2010   Patient Assessment/Suction   Level of Consciousness (AVPU) alert   Respiratory Effort Mild   Expansion/Accessory Muscles/Retractions no use of accessory muscles;no retractions   All Lung Fields Breath Sounds diminished;crackles fine   PRE-TX-O2-ETCO2   O2 Device (Oxygen Therapy) High Flow nasal Cannula   $ Is the patient on Oxygen? Yes   $ Is the patient on high flow oxygen? Yes   Flow (L/min) 10   SpO2 (!) 93 %   Pulse 86   Resp 16   Aerosol Therapy   $ Aerosol Therapy Charges Aerosol Treatment   Respiratory Treatment Status given   SVN/Inhaler Treatment Route mask   Position During Treatment HOB at 30 degrees   Patient Tolerance good   Post-Treatment   Post-treatment Heart Rate (beats/min) 88   Post-treatment Resp Rate (breaths/min) 16   All Fields Breath Sounds aeration increased

## 2017-03-25 NOTE — PLAN OF CARE
Problem: Patient Care Overview  Goal: Plan of Care Review  Outcome: Ongoing (interventions implemented as appropriate)  Patient slept for the majority of the night wish occasional awakening for pain medication. Patient was asking questions about hospice and seems to be slowly accepting her terminal diagnosis. VSS. Patient remains free from injury. Oxygen saturation remains adequate.

## 2017-03-25 NOTE — ASSESSMENT & PLAN NOTE
Cause of elevated troponin. Associated with severe lung mets with decreased O2 consumption. No further cardiac evaluation work up needed. Monitor on telemetry.

## 2017-03-25 NOTE — SUBJECTIVE & OBJECTIVE
Interval History: uneventful night. Pain is better controlled. Her breathing is stable. She is interested in discussing hospice.       Review of Systems   Constitutional: Negative for chills and fever.   Respiratory: Negative for shortness of breath.    Cardiovascular: Negative for chest pain.   Gastrointestinal: Negative for abdominal pain.   Genitourinary: Negative for dysuria.   Musculoskeletal: Positive for arthralgias.   Neurological: Positive for weakness.   Psychiatric/Behavioral: Negative for confusion.     Objective:     Vital Signs (Most Recent):  Temp: 97.9 °F (36.6 °C) (03/25/17 1130)  Pulse: (!) 114 (03/25/17 1152)  Resp: 17 (03/25/17 1152)  BP: (!) 133/101 (03/25/17 1145)  SpO2: 99 % (03/25/17 1152) Vital Signs (24h Range):  Temp:  [97.9 °F (36.6 °C)-98.3 °F (36.8 °C)] 97.9 °F (36.6 °C)  Pulse:  [] 114  Resp:  [9-29] 17  SpO2:  [90 %-100 %] 99 %  BP: (107-155)/() 133/101     Weight: 64.9 kg (143 lb 1.3 oz)  Body mass index is 26.17 kg/(m^2).    Intake/Output Summary (Last 24 hours) at 03/25/17 1435  Last data filed at 03/25/17 0500   Gross per 24 hour   Intake          3828.33 ml   Output             3420 ml   Net           408.33 ml      Physical Exam   Constitutional: Vital signs are normal. She appears well-developed and well-nourished. She is cooperative. No distress. Nasal cannula in place.   HENT:   Head: Normocephalic and atraumatic.   Eyes: Conjunctivae are normal.   R ptosis   Neck: Neck supple. No JVD present.   Cardiovascular: Regular rhythm and normal heart sounds.  Tachycardia present.    Pulmonary/Chest: Effort normal. No respiratory distress. She has rales.   Abdominal: Soft. Bowel sounds are normal. She exhibits no distension. There is no tenderness.   Musculoskeletal: She exhibits edema (b/l LLEs).   Neurological: She is alert.   Psychiatric: She has a normal mood and affect. Her behavior is normal.   Vitals reviewed.      Significant Labs: All pertinent labs within the  past 24 hours have been reviewed.    Significant Imaging: I have reviewed all pertinent imaging results/findings within the past 24 hours.

## 2017-03-26 NOTE — PROGRESS NOTES
Family at bedside spoke with them about her terminal state and how she wants comfort measures and just to be very comfortable and get pain relief. Family understands and realizes she may pass away during this hospital stay and not even make it to hospice care at home.

## 2017-03-26 NOTE — CONSULTS
Kat Corley [id number] is a 46 y.o. female who had been consulted for vancomycin dosing.    Vancomycin has been discontinued.  Pharmacy consult for vancomycin dosing in no longer required.      Thank you for allowing us to participate in this patient's care.     Yoko Siddiqui

## 2017-03-26 NOTE — PROGRESS NOTES
Pt has complaints of pain, she states it is worse when she is in bed all the time she does better getting up. I medicated her for pain per mar and told her we would get her up to chair today. Also talked with her about hospice, she stated she is waiting to talk with them so she can go home with hospice and she can have better pain control..

## 2017-03-26 NOTE — PLAN OF CARE
Problem: Patient Care Overview  Goal: Plan of Care Review  Outcome: Outcome(s) achieved Date Met:  03/26/17  Spoke with shane in detail about care and how she wants the course of care to continue, she wants hospice and comfort care at this time morphine drip started and cont comfort assessment done. Pt wanst o2 off but I have talked to her and are leaving it on for now.

## 2017-03-26 NOTE — PROGRESS NOTES
Pt getting medication titrated for her resp hunger  and pt received ativan for anxiety. She wants to be comfortable.

## 2017-03-26 NOTE — SUBJECTIVE & OBJECTIVE
Interval History: uneventful night. Pain is worse. Breathing worse. Ready to go home with hospice to be with family.       Review of Systems   Constitutional: Negative for chills and fever.   Respiratory: Positive for shortness of breath.    Cardiovascular: Negative for chest pain.   Gastrointestinal: Negative for abdominal pain.   Genitourinary: Negative for dysuria.   Musculoskeletal: Positive for arthralgias.   Neurological: Positive for weakness.   Psychiatric/Behavioral: Negative for confusion.     Objective:     Vital Signs (Most Recent):  Temp: 98 °F (36.7 °C) (03/26/17 0730)  Pulse: 107 (03/26/17 1119)  Resp: 18 (03/26/17 1119)  BP: (!) 134/92 (03/26/17 1000)  SpO2: (!) 92 % (03/26/17 1119) Vital Signs (24h Range):  Temp:  [97 °F (36.1 °C)-98.6 °F (37 °C)] 98 °F (36.7 °C)  Pulse:  [] 107  Resp:  [11-30] 18  SpO2:  [79 %-99 %] 92 %  BP: (132-160)/(78-93) 134/92     Weight: 64.9 kg (143 lb 1.3 oz)  Body mass index is 26.17 kg/(m^2).    Intake/Output Summary (Last 24 hours) at 03/26/17 1211  Last data filed at 03/26/17 0605   Gross per 24 hour   Intake          4628.33 ml   Output             4750 ml   Net          -121.67 ml      Physical Exam   Constitutional: Vital signs are normal. She appears well-developed and well-nourished. She is cooperative. She has a sickly appearance. No distress. Nasal cannula in place.   HENT:   Head: Normocephalic and atraumatic.   Eyes: Conjunctivae are normal.   R ptosis   Neck: Neck supple. No JVD present.   Cardiovascular: Regular rhythm and normal heart sounds.  Tachycardia present.    Pulmonary/Chest: Effort normal. No respiratory distress. She has rales.   Abdominal: Soft. Bowel sounds are normal. She exhibits no distension. There is no tenderness.   Musculoskeletal: She exhibits edema (b/l LLEs).   Neurological: She is alert.   Psychiatric: She has a normal mood and affect. Her behavior is normal.   Vitals reviewed.      Significant Labs: All pertinent labs within  the past 24 hours have been reviewed.    Significant Imaging: I have reviewed all pertinent imaging results/findings within the past 24 hours.

## 2017-03-26 NOTE — PLAN OF CARE
Problem: Patient Care Overview  Goal: Plan of Care Review  Outcome: Ongoing (interventions implemented as appropriate)  On awakening for anything pt complaining of severe pain all over. IV analgesic dose increased from 1 to 2 mg IVP On high flow nasal cannula and desats quickly to 60's when pt removes.St on monitor. Am labs reported to MARIETTA Forrester NP.

## 2017-03-26 NOTE — CONSULTS
Kat Corley [id number] is a 46 y.o. female who has been consulted for vancomycin dosing.    The patient has the following labs:     Date Creatinine (mg/dl)    BUN WBC Count   3/25/2017 Estimated Creatinine Clearance: 126.9 mL/min (based on Cr of 0.5). Lab Results   Component Value Date    BUN 4 (L) 03/25/2017     Lab Results   Component Value Date    WBC 4.90 03/25/2017        Current weight is 64.9 kg (143 lb 1.3 oz)    Pt is receiving vancomycin 1000 mg every 8 hours.  Vancomycin trough from 3/25 at 2033 was 17.6 mg/dL.  Target trough range is 15-20 mg/dL.   Trough was drawn on time and anticipate it is therapeutic. Pharmacy will continue current dose.   The patient will be continued on a vancomycin dose of 1000 mg every 8 hours. A vancomycin trough has been ordered prior to 4th dose due 3/26 at 2300.      Patient will be followed by pharmacy for changes in renal function, toxicity, and efficacy.  Thank you for allowing us to participate in this patient's care.     Estefania Medina

## 2017-03-26 NOTE — PROGRESS NOTES
[facility] Medicine  Progress Note    Patient Name: Kat Corley  MRN: [id number]  Patient Class: IP- Inpatient   Admission Date: 3/22/2017  Length of Stay: 4 days  Attending Physician: Shahrzad Plasencia MD  Primary Care Provider: NAMITA Guerin    Subjective:     Principal Problem:Acute on chronic respiratory failure with hypoxia and hypercapnia    HPI:  Ms. Corley is an unfortunate 45 yo  admitted to the services of hospital medicine via the ER for pancytopenia, hypomagnesemia and intractable pain 2 to metastatic breast CA.  Diagnosed in January of this year. C1 D12 of carboplatin/taxol. Has metastatic disease to liver, SQ tissue, spine and femur. Did receive one treatment of XRT for bone mets. Has pathological fx of L spine. Pt not receptive to hospice at this time. She came to the ED due to SOB. She currently smokes 2-3 cigarettes a day. She has been coughing clear phlegm since her recent discharge. She completed an oral course of 2 antibiotics.   Per admitting NP:  Presented with acute respiratory failure. Noted to have hypoxia, tachypnea and tachycardia. Recent history and ROS limited due to Bipap mask and SOB. no evidence of confusion. No famiily present. admitted to the ICU. empiric coverage for HCAP. Daily CXR. Consult pulmonary and oncology. Resume fentanyl patch with IV dilaudid for breakthrough. will check venous blood gas to evaluate for C02 retention. Poor prognosis.        Hospital Course:  Admitted to ICU. IV abx given possible CAP vs atelectasis. Bipap and supplemental O2 given for respiratory support. Pulmonology consulted. She has been weaned off Bipap to high flow NC. She quickly desaturates to the 60s when off NC. She has come to term with her terminal illness and agreed to hospice care.     Interval History: uneventful night. Pain is worse. Breathing worse. Ready to go home with hospice to be with family.       Review of Systems   Constitutional: Negative for  chills and fever.   Respiratory: Positive for shortness of breath.    Cardiovascular: Negative for chest pain.   Gastrointestinal: Negative for abdominal pain.   Genitourinary: Negative for dysuria.   Musculoskeletal: Positive for arthralgias.   Neurological: Positive for weakness.   Psychiatric/Behavioral: Negative for confusion.     Objective:     Vital Signs (Most Recent):  Temp: 98 °F (36.7 °C) (03/26/17 0730)  Pulse: 107 (03/26/17 1119)  Resp: 18 (03/26/17 1119)  BP: (!) 134/92 (03/26/17 1000)  SpO2: (!) 92 % (03/26/17 1119) Vital Signs (24h Range):  Temp:  [97 °F (36.1 °C)-98.6 °F (37 °C)] 98 °F (36.7 °C)  Pulse:  [] 107  Resp:  [11-30] 18  SpO2:  [79 %-99 %] 92 %  BP: (132-160)/(78-93) 134/92     Weight: 64.9 kg (143 lb 1.3 oz)  Body mass index is 26.17 kg/(m^2).    Intake/Output Summary (Last 24 hours) at 03/26/17 1211  Last data filed at 03/26/17 0605   Gross per 24 hour   Intake          4628.33 ml   Output             4750 ml   Net          -121.67 ml      Physical Exam   Constitutional: Vital signs are normal. She appears well-developed and well-nourished. She is cooperative. She has a sickly appearance. No distress. Nasal cannula in place.   HENT:   Head: Normocephalic and atraumatic.   Eyes: Conjunctivae are normal.   R ptosis   Neck: Neck supple. No JVD present.   Cardiovascular: Regular rhythm and normal heart sounds.  Tachycardia present.    Pulmonary/Chest: Effort normal. No respiratory distress. She has rales.   Abdominal: Soft. Bowel sounds are normal. She exhibits no distension. There is no tenderness.   Musculoskeletal: She exhibits edema (b/l LLEs).   Neurological: She is alert.   Psychiatric: She has a normal mood and affect. Her behavior is normal.   Vitals reviewed.      Significant Labs: All pertinent labs within the past 24 hours have been reviewed.    Significant Imaging: I have reviewed all pertinent imaging results/findings within the past 24 hours.    Assessment/Plan:       Multiple lesions of metastatic malignancy  Hospice care      * Acute on chronic respiratory failure with hypoxia and hypercapnia  Supplemental O2. Hospice care.       Hypomagnesemia  Hospice care.     Metastatic cancer to bone  Pain control. Hospice care.      Hypokalemia  Hospice care.       Anemia of chronic disease  Hospice care.       Lymphangitic lung metastasis  Hospice care.       Atelectasis of left lung        Demand ischemia  Hospice care.       Thrombocytopenia  Hospice care.     VTE Risk Mitigation         Ordered     apixaban tablet 5 mg  2 times daily     Route:  Oral        03/23/17 0206     High Risk of VTE  Once      03/22/17 2350     Reason for No Pharmacological VTE Prophylaxis  Once      03/22/17 2350        Pt now hospice/ comfort care. All antibiotics stopped. No pressors. No CPR. No intubation. Comfort measures only.       Shahrzad Plasencia MD  Department of Hospital Medicine   [facility]

## 2017-03-26 NOTE — PLAN OF CARE
Problem: Patient Care Overview  Goal: Plan of Care Review  Outcome: Ongoing (interventions implemented as appropriate)  HFNC 10 lpm in use with sats 97% and resp txs Q 4 hrs tolerates well.

## 2017-03-27 VITALS
OXYGEN SATURATION: 64 % | SYSTOLIC BLOOD PRESSURE: 52 MMHG | RESPIRATION RATE: 13 BRPM | TEMPERATURE: 98 F | DIASTOLIC BLOOD PRESSURE: 37 MMHG | HEART RATE: 133 BPM | BODY MASS INDEX: 26.33 KG/M2 | HEIGHT: 62 IN | WEIGHT: 143.06 LBS

## 2017-03-27 NOTE — PROGRESS NOTES
Pt sedated on Morphine drip appears comfortable. Daughter Katharine called and updated on pt's grave condition and encouraged to come be with mother tonight. Stated she would be coming

## 2017-03-27 NOTE — SUBJECTIVE & OBJECTIVE
Interval History: patient passed yesterday evening after accepting hospice care.     Review of Systems   Unable to perform ROS: Other (death)     Objective:     Vital Signs (Most Recent):  Temp: 97.6 °F (36.4 °C) (17)  Pulse: (!) 133 (17)  Resp: 13 (17)  BP: (!) 52/37 (17)  SpO2: (!) 64 % (17) Vital Signs (24h Range):  Temp:  [97.6 °F (36.4 °C)] 97.6 °F (36.4 °C)  Pulse:  [129-135] 133  Resp:  [13-19] 13  SpO2:  [53 %-64 %] 64 %  BP: ()/(37-89)      Weight: 64.9 kg (143 lb 1.3 oz)  Body mass index is 26.17 kg/(m^2).    Intake/Output Summary (Last 24 hours) at 17 1830  Last data filed at 17 2200   Gross per 24 hour   Intake           555.84 ml   Output              600 ml   Net           -44.16 ml      Physical Exam- pt

## 2017-03-27 NOTE — DISCHARGE SUMMARY
[facility] Hospital Medicine  Discharge Summary      Patient Name: Kat Corley  MRN: [id number]  Admission Date: 3/22/2017  Hospital Length of Stay: 5 days  Discharge Date and Time:  03/27/2017 6:36 PM  Attending Physician: Shahrzad Plasencia MD    Discharging Provider: Shahrzad Plasencia MD  Primary Care Provider: NAMITA Guerin      HPI:   Ms. Corley is an unfortunate 47 yo  admitted to the services of hospital medicine via the ER for pancytopenia, hypomagnesemia and intractable pain 2 to metastatic breast CA.  Diagnosed in January of this year. C1 D12 of carboplatin/taxol. Has metastatic disease to liver, SQ tissue, spine and femur. Did receive one treatment of XRT for bone mets. Has pathological fx of L spine. Pt not receptive to hospice at this time. She came to the ED due to SOB. She currently smokes 2-3 cigarettes a day. She has been coughing clear phlegm since her recent discharge. She completed an oral course of 2 antibiotics.   Per admitting NP:  Presented with acute respiratory failure. Noted to have hypoxia, tachypnea and tachycardia. Recent history and ROS limited due to Bipap mask and SOB. no evidence of confusion. No famiily present. admitted to the ICU. empiric coverage for HCAP. Daily CXR. Consult pulmonary and oncology. Resume fentanyl patch with IV dilaudid for breakthrough. will check venous blood gas to evaluate for C02 retention. Poor prognosis.        Indwelling Lines/Drains at time of discharge:   Lines/Drains/Airways     Central Venous Catheter Line                 Port A Cath Single Lumen 02/15/17 1400 other (see comments) 40 days          Drain                 Urethral Catheter 03/23/17 0010 Non-latex 16 Fr. 4 days              Hospital Course:   Admitted to ICU. IV abx given possible CAP vs atelectasis. Bipap and supplemental O2 given for respiratory support. Pulmonology consulted. She has been weaned off Bipap to high flow NC. She quickly desaturates to the 60s  when off NC. She was informed that her cancer is severely affecting her lungs and her prognosis was terminal. She has come to term with her illness and agreed to hospice care. She was placed on a morphine drip for her severe pain. She  several hours later.      Consults:   Consults         Status Ordering Provider     Inpatient consult to Oncology  Once     Provider:  Herlinda Arriola MD    Completed YUNG NESS     Inpatient consult to Pulmonology  Once     Provider:  Yung Ness MD    Completed STEPHANIE PAN     Inpatient consult to Social Work/Case Management  Once     Provider:  (Not yet assigned)    Completed STEPHANIE PAN     Inpatient consult to Social Work/Case Management  Once     Provider:  (Not yet assigned)    Completed SIDNEY ROMERO     IP consult to dietary  Once     Provider:  (Not yet assigned)    Completed STEPHANIE PAN     Pharmacy to dose Vancomycin consult  Once     Provider:  (Not yet assigned)    Completed STEPHANIE PAN          Significant Diagnostic Studies: Radiology: X-Ray: CXR: X-Ray Chest 1 View (CXR):   Results for orders placed or performed during the hospital encounter of 17   X-Ray Chest 1 View    Narrative    Comparison: 3/24/17    Technique: Single, AP chest radiograph.    Findings: Right upper extremity peripherally inserted port catheter is identified with the tip poorly visualized but likely in the right atrium. Cardiomegaly is noted. Diffuse severe interstitial prominence is noted. Persistent left basilar consolidation and left pleural fluid noted, unchanged. No pneumothorax is seen. Cardiac monitor leads overlie the chest. A stent projects over the right paramedian upper abdomen. Large patient body habitus.    Loss of vertebral body height is noted within a lower thoracic vertebral body, unchanged. Scattered lytic lesions are noted throughout the visualized skeleton. Multiple right-sided rib fractures are noted which may be pathologic, as  present on recent CT.    Impression    1.  Stable radiographic appearance to the chest. Bilateral interstitial lung disease is identified for which differential considerations include lymphangitic spread of disease, pulmonary edema and pneumonia. Left pleural effusion and left basilar consolidation also again noted. Osseous metastatic disease also again noted along with multiple posterior right-sided rib fractures which may be pathologic.      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] 2017 Yes    Atelectasis of left lung [J98.11] 2017 Yes    Demand ischemia [I24.8] 2017 Yes    Anemia of chronic disease [D63.8] 02/15/2017 Yes    Hypokalemia [E87.6] 2017 Yes    Metastatic cancer to bone [C79.51] 2017 Yes    Hypomagnesemia [E83.42] 01/10/2017 Yes      Problems Resolved During this Admission:    Diagnosis Date Noted Date Resolved POA        * Acute on chronic respiratory failure with hypoxia and hypercapnia    Multiple lesions of metastatic malignancy    Metastatic cancer to bone    Lymphangitic lung metastasis    Atelectasis of left lung    Anemia of chronic disease    Demand ischemia    Hypokalemia    Discharged Condition:     Disposition:     Medications:  None (patient  at medical facility)     Time spent on the discharge of patient: 34 minutes    Shahrzad Plasencia MD  Department of Hospital Medicine  [facility]

## 2017-03-27 NOTE — PROGRESS NOTES
Pt's armand and daughter Katharine arrived at pt bedside. Pt's heart rate from 120's to 50's to asystole shortly after their arrival. Pt pronounced by Dr. Velásquez

## 2017-03-28 LAB
BACTERIA BLD CULT: NORMAL
BACTERIA BLD CULT: NORMAL

## 2020-01-01 NOTE — PLAN OF CARE
Problem: Patient Care Overview  Goal: Plan of Care Review  Outcome: Ongoing (interventions implemented as appropriate)  Patient tolerated infusion well.  No reaction suspected.  No questions or concerns from patient.  AVS given to patient.  Ambulated off unit unassisted.        Routine  care and anticipatory guidance/other

## 2021-03-10 NOTE — PLAN OF CARE
Problem: Patient Care Overview  Goal: Plan of Care Review  Outcome: Ongoing (interventions implemented as appropriate)  Patient receives aerosol therapy Q6 and was placed on NC-2L to maintain sats > 92%.       18

## 2024-04-08 NOTE — SUBJECTIVE & OBJECTIVE
Interval History: pt with uneventful night. She is sleeping fairly. Pain is well controlled.     Review of Systems   Constitutional: Negative for chills and fever.   Respiratory: Negative for shortness of breath.    Cardiovascular: Negative for chest pain.   Gastrointestinal: Negative for abdominal pain.   Genitourinary: Negative for dysuria.   Neurological: Negative for dizziness and light-headedness.     Objective:     Vital Signs (Most Recent):  Temp: 97.7 °F (36.5 °C) (01/11/17 0800)  Pulse: 109 (01/11/17 0800)  Resp: 18 (01/11/17 0800)  BP: 118/86 (01/11/17 0800)  SpO2: (!) 93 % (01/11/17 0800) Vital Signs (24h Range):  Temp:  [97.7 °F (36.5 °C)-99.1 °F (37.3 °C)] 97.7 °F (36.5 °C)  Pulse:  [102-118] 109  Resp:  [16-19] 18  BP: (113-130)/(70-86) 118/86     Weight: 73.5 kg (162 lb)  Body mass index is 29.63 kg/(m^2).    Intake/Output Summary (Last 24 hours) at 01/11/17 1143  Last data filed at 01/11/17 0558   Gross per 24 hour   Intake          3222.92 ml   Output                0 ml   Net          3222.92 ml      Physical Exam   Constitutional: She appears well-developed and well-nourished. No distress.   HENT:   Head: Normocephalic and atraumatic.   Right Ear: External ear normal.   Left Ear: External ear normal.   Nose: Nose normal.   Mouth/Throat: Oropharynx is clear and moist. No oropharyngeal exudate.   Eyes: Conjunctivae are normal.   Neck: Neck supple. No JVD present.   Cardiovascular: Normal rate, regular rhythm and normal heart sounds.    Pulmonary/Chest: Effort normal and breath sounds normal. No respiratory distress.   Abdominal: Soft. Bowel sounds are normal. She exhibits no distension. There is no tenderness.   Musculoskeletal: She exhibits no edema.   Neurological: She is alert.   Psychiatric: She has a normal mood and affect. Her behavior is normal.   Vitals reviewed.      Significant Labs:   CMP:   Recent Labs  Lab 01/10/17  0700 01/11/17  0533   * 135*   K 3.6 3.8   CL 97 95   CO2 26 30*  Pt admitted for C3 D8 Epcoritamab SQ. Injection to LLQ of abdomen. Pt tolerated well.      114*   BUN 5* 4*   CREATININE 0.5 0.5   CALCIUM 10.1 10.3   PROT 6.1 5.8*   ALBUMIN 2.0* 1.9*   BILITOT 5.3* 6.0*   ALKPHOS 1690* 1581*   AST 97* 87*   * 176*   ANIONGAP 12 10   EGFRNONAA >60 >60       Significant Imaging: I have reviewed all pertinent imaging results/findings within the past 24 hours.